# Patient Record
Sex: FEMALE | Race: WHITE | NOT HISPANIC OR LATINO | Employment: OTHER | URBAN - METROPOLITAN AREA
[De-identification: names, ages, dates, MRNs, and addresses within clinical notes are randomized per-mention and may not be internally consistent; named-entity substitution may affect disease eponyms.]

---

## 2017-06-16 ENCOUNTER — APPOINTMENT (OUTPATIENT)
Dept: GENERAL RADIOLOGY | Facility: HOSPITAL | Age: 82
End: 2017-06-16

## 2017-06-16 ENCOUNTER — HOSPITAL ENCOUNTER (INPATIENT)
Facility: HOSPITAL | Age: 82
LOS: 2 days | Discharge: HOME HEALTH | End: 2017-06-19
Attending: EMERGENCY MEDICINE | Admitting: HOSPITALIST

## 2017-06-16 DIAGNOSIS — S72.142A DISPLACED INTERTROCHANTERIC FRACTURE OF LEFT FEMUR, INITIAL ENCOUNTER FOR CLOSED FRACTURE (HCC): ICD-10-CM

## 2017-06-16 DIAGNOSIS — R26.2 DIFFICULTY WALKING: ICD-10-CM

## 2017-06-16 DIAGNOSIS — S72.002A CLOSED LEFT HIP FRACTURE, INITIAL ENCOUNTER (HCC): Primary | ICD-10-CM

## 2017-06-16 PROCEDURE — 93010 ELECTROCARDIOGRAM REPORT: CPT | Performed by: INTERNAL MEDICINE

## 2017-06-16 PROCEDURE — 93005 ELECTROCARDIOGRAM TRACING: CPT | Performed by: EMERGENCY MEDICINE

## 2017-06-16 PROCEDURE — 73502 X-RAY EXAM HIP UNI 2-3 VIEWS: CPT

## 2017-06-16 PROCEDURE — 99284 EMERGENCY DEPT VISIT MOD MDM: CPT

## 2017-06-16 PROCEDURE — 71010 HC CHEST PA OR AP: CPT

## 2017-06-16 RX ORDER — ONDANSETRON 2 MG/ML
4 INJECTION INTRAMUSCULAR; INTRAVENOUS ONCE
Status: DISCONTINUED | OUTPATIENT
Start: 2017-06-16 | End: 2017-06-17

## 2017-06-17 ENCOUNTER — ANESTHESIA (OUTPATIENT)
Dept: PERIOP | Facility: HOSPITAL | Age: 82
End: 2017-06-17

## 2017-06-17 ENCOUNTER — ANESTHESIA EVENT (OUTPATIENT)
Dept: PERIOP | Facility: HOSPITAL | Age: 82
End: 2017-06-17

## 2017-06-17 ENCOUNTER — APPOINTMENT (OUTPATIENT)
Dept: GENERAL RADIOLOGY | Facility: HOSPITAL | Age: 82
End: 2017-06-17

## 2017-06-17 PROBLEM — S72.002A CLOSED LEFT HIP FRACTURE (HCC): Status: ACTIVE | Noted: 2017-06-17

## 2017-06-17 LAB
ABO GROUP BLD: NORMAL
ALBUMIN SERPL-MCNC: 3.9 G/DL (ref 3.5–5.2)
ALBUMIN/GLOB SERPL: 1.1 G/DL
ALP SERPL-CCNC: 51 U/L (ref 39–117)
ALT SERPL W P-5'-P-CCNC: 13 U/L (ref 1–33)
ANION GAP SERPL CALCULATED.3IONS-SCNC: 17.1 MMOL/L
AST SERPL-CCNC: 18 U/L (ref 1–32)
BASOPHILS # BLD AUTO: 0.07 10*3/MM3 (ref 0–0.2)
BASOPHILS NFR BLD AUTO: 0.5 % (ref 0–1.5)
BILIRUB SERPL-MCNC: 0.3 MG/DL (ref 0.1–1.2)
BLD GP AB SCN SERPL QL: NEGATIVE
BUN BLD-MCNC: 21 MG/DL (ref 8–23)
BUN/CREAT SERPL: 29.2 (ref 7–25)
CALCIUM SPEC-SCNC: 8.9 MG/DL (ref 8.6–10.5)
CHLORIDE SERPL-SCNC: 100 MMOL/L (ref 98–107)
CO2 SERPL-SCNC: 18.9 MMOL/L (ref 22–29)
CREAT BLD-MCNC: 0.72 MG/DL (ref 0.57–1)
DEPRECATED RDW RBC AUTO: 51.9 FL (ref 37–54)
EOSINOPHIL # BLD AUTO: 0.05 10*3/MM3 (ref 0–0.7)
EOSINOPHIL NFR BLD AUTO: 0.4 % (ref 0.3–6.2)
ERYTHROCYTE [DISTWIDTH] IN BLOOD BY AUTOMATED COUNT: 14.6 % (ref 11.7–13)
GFR SERPL CREATININE-BSD FRML MDRD: 78 ML/MIN/1.73
GLOBULIN UR ELPH-MCNC: 3.4 GM/DL
GLUCOSE BLD-MCNC: 106 MG/DL (ref 65–99)
HCT VFR BLD AUTO: 37.1 % (ref 35.6–45.5)
HGB BLD-MCNC: 12.2 G/DL (ref 11.9–15.5)
IMM GRANULOCYTES # BLD: 0.04 10*3/MM3 (ref 0–0.03)
IMM GRANULOCYTES NFR BLD: 0.3 % (ref 0–0.5)
INR PPP: 1.06 (ref 0.9–1.1)
LYMPHOCYTES # BLD AUTO: 1.41 10*3/MM3 (ref 0.9–4.8)
LYMPHOCYTES NFR BLD AUTO: 10.9 % (ref 19.6–45.3)
MCH RBC QN AUTO: 32.1 PG (ref 26.9–32)
MCHC RBC AUTO-ENTMCNC: 32.9 G/DL (ref 32.4–36.3)
MCV RBC AUTO: 97.6 FL (ref 80.5–98.2)
MONOCYTES # BLD AUTO: 0.7 10*3/MM3 (ref 0.2–1.2)
MONOCYTES NFR BLD AUTO: 5.4 % (ref 5–12)
NEUTROPHILS # BLD AUTO: 10.68 10*3/MM3 (ref 1.9–8.1)
NEUTROPHILS NFR BLD AUTO: 82.5 % (ref 42.7–76)
PLATELET # BLD AUTO: 260 10*3/MM3 (ref 140–500)
PMV BLD AUTO: 10.4 FL (ref 6–12)
POTASSIUM BLD-SCNC: 3.8 MMOL/L (ref 3.5–5.2)
PROT SERPL-MCNC: 7.3 G/DL (ref 6–8.5)
PROTHROMBIN TIME: 13.4 SECONDS (ref 11.7–14.2)
RBC # BLD AUTO: 3.8 10*6/MM3 (ref 3.9–5.2)
RH BLD: POSITIVE
SODIUM BLD-SCNC: 136 MMOL/L (ref 136–145)
WBC NRBC COR # BLD: 12.95 10*3/MM3 (ref 4.5–10.7)

## 2017-06-17 PROCEDURE — C1713 ANCHOR/SCREW BN/BN,TIS/BN: HCPCS | Performed by: ORTHOPAEDIC SURGERY

## 2017-06-17 PROCEDURE — 80053 COMPREHEN METABOLIC PANEL: CPT | Performed by: EMERGENCY MEDICINE

## 2017-06-17 PROCEDURE — 25010000002 ONDANSETRON PER 1 MG: Performed by: NURSE ANESTHETIST, CERTIFIED REGISTERED

## 2017-06-17 PROCEDURE — 25010000002 HYDROMORPHONE PER 4 MG: Performed by: NURSE ANESTHETIST, CERTIFIED REGISTERED

## 2017-06-17 PROCEDURE — 25010000002 FENTANYL CITRATE (PF) 100 MCG/2ML SOLUTION: Performed by: NURSE ANESTHETIST, CERTIFIED REGISTERED

## 2017-06-17 PROCEDURE — 25010000002 PROPOFOL 10 MG/ML EMULSION: Performed by: NURSE ANESTHETIST, CERTIFIED REGISTERED

## 2017-06-17 PROCEDURE — 0QH706Z INSERTION OF INTRAMEDULLARY INTERNAL FIXATION DEVICE INTO LEFT UPPER FEMUR, OPEN APPROACH: ICD-10-PCS | Performed by: ORTHOPAEDIC SURGERY

## 2017-06-17 PROCEDURE — 73502 X-RAY EXAM HIP UNI 2-3 VIEWS: CPT

## 2017-06-17 PROCEDURE — 25010000002 MORPHINE PER 10 MG: Performed by: HOSPITALIST

## 2017-06-17 PROCEDURE — 86900 BLOOD TYPING SEROLOGIC ABO: CPT | Performed by: EMERGENCY MEDICINE

## 2017-06-17 PROCEDURE — 85025 COMPLETE CBC W/AUTO DIFF WBC: CPT | Performed by: EMERGENCY MEDICINE

## 2017-06-17 PROCEDURE — 76000 FLUOROSCOPY <1 HR PHYS/QHP: CPT

## 2017-06-17 PROCEDURE — 86901 BLOOD TYPING SEROLOGIC RH(D): CPT | Performed by: EMERGENCY MEDICINE

## 2017-06-17 PROCEDURE — 25010000003 CEFAZOLIN IN DEXTROSE 2-4 GM/100ML-% SOLUTION: Performed by: ORTHOPAEDIC SURGERY

## 2017-06-17 PROCEDURE — 85610 PROTHROMBIN TIME: CPT | Performed by: EMERGENCY MEDICINE

## 2017-06-17 PROCEDURE — 0QS7XZZ REPOSITION LEFT UPPER FEMUR, EXTERNAL APPROACH: ICD-10-PCS | Performed by: ORTHOPAEDIC SURGERY

## 2017-06-17 PROCEDURE — 73501 X-RAY EXAM HIP UNI 1 VIEW: CPT

## 2017-06-17 PROCEDURE — 25010000002 DEXAMETHASONE PER 1 MG: Performed by: NURSE ANESTHETIST, CERTIFIED REGISTERED

## 2017-06-17 PROCEDURE — 86850 RBC ANTIBODY SCREEN: CPT | Performed by: EMERGENCY MEDICINE

## 2017-06-17 PROCEDURE — 25010000002 MIDAZOLAM PER 1 MG: Performed by: ANESTHESIOLOGY

## 2017-06-17 DEVICE — K-WIRE: Type: IMPLANTABLE DEVICE | Status: FUNCTIONAL

## 2017-06-17 DEVICE — LOCKING SCREW, FULLY THREADED: Type: IMPLANTABLE DEVICE | Status: FUNCTIONAL

## 2017-06-17 DEVICE — LAG SCREW, TI
Type: IMPLANTABLE DEVICE | Status: FUNCTIONAL
Brand: GAMMA

## 2017-06-17 DEVICE — TROCHANTERIC NAIL KIT, TI
Type: IMPLANTABLE DEVICE | Status: FUNCTIONAL
Brand: GAMMA

## 2017-06-17 RX ORDER — SODIUM CHLORIDE, SODIUM LACTATE, POTASSIUM CHLORIDE, CALCIUM CHLORIDE 600; 310; 30; 20 MG/100ML; MG/100ML; MG/100ML; MG/100ML
9 INJECTION, SOLUTION INTRAVENOUS CONTINUOUS
Status: DISCONTINUED | OUTPATIENT
Start: 2017-06-17 | End: 2017-06-17

## 2017-06-17 RX ORDER — PROMETHAZINE HYDROCHLORIDE 25 MG/ML
6.25 INJECTION, SOLUTION INTRAMUSCULAR; INTRAVENOUS ONCE AS NEEDED
Status: DISCONTINUED | OUTPATIENT
Start: 2017-06-17 | End: 2017-06-17

## 2017-06-17 RX ORDER — SODIUM CHLORIDE 0.9 % (FLUSH) 0.9 %
1-10 SYRINGE (ML) INJECTION AS NEEDED
Status: DISCONTINUED | OUTPATIENT
Start: 2017-06-17 | End: 2017-06-17 | Stop reason: HOSPADM

## 2017-06-17 RX ORDER — FENTANYL CITRATE 50 UG/ML
INJECTION, SOLUTION INTRAMUSCULAR; INTRAVENOUS
Status: DISPENSED
Start: 2017-06-17 | End: 2017-06-18

## 2017-06-17 RX ORDER — LIDOCAINE HYDROCHLORIDE 20 MG/ML
INJECTION, SOLUTION INFILTRATION; PERINEURAL AS NEEDED
Status: DISCONTINUED | OUTPATIENT
Start: 2017-06-17 | End: 2017-06-17 | Stop reason: SURG

## 2017-06-17 RX ORDER — HYDROCODONE BITARTRATE AND ACETAMINOPHEN 5; 325 MG/1; MG/1
1 TABLET ORAL ONCE AS NEEDED
Status: DISCONTINUED | OUTPATIENT
Start: 2017-06-17 | End: 2017-06-17

## 2017-06-17 RX ORDER — DOCUSATE SODIUM 100 MG/1
100 CAPSULE, LIQUID FILLED ORAL 2 TIMES DAILY PRN
Status: DISCONTINUED | OUTPATIENT
Start: 2017-06-17 | End: 2017-06-19 | Stop reason: HOSPADM

## 2017-06-17 RX ORDER — FENTANYL CITRATE 50 UG/ML
50 INJECTION, SOLUTION INTRAMUSCULAR; INTRAVENOUS
Status: DISCONTINUED | OUTPATIENT
Start: 2017-06-17 | End: 2017-06-17 | Stop reason: HOSPADM

## 2017-06-17 RX ORDER — ONDANSETRON 2 MG/ML
INJECTION INTRAMUSCULAR; INTRAVENOUS AS NEEDED
Status: DISCONTINUED | OUTPATIENT
Start: 2017-06-17 | End: 2017-06-17 | Stop reason: SURG

## 2017-06-17 RX ORDER — SODIUM CHLORIDE 9 MG/ML
100 INJECTION, SOLUTION INTRAVENOUS CONTINUOUS
Status: DISCONTINUED | OUTPATIENT
Start: 2017-06-17 | End: 2017-06-18

## 2017-06-17 RX ORDER — DEXAMETHASONE SODIUM PHOSPHATE 10 MG/ML
INJECTION INTRAMUSCULAR; INTRAVENOUS AS NEEDED
Status: DISCONTINUED | OUTPATIENT
Start: 2017-06-17 | End: 2017-06-17 | Stop reason: SURG

## 2017-06-17 RX ORDER — SODIUM CHLORIDE, SODIUM LACTATE, POTASSIUM CHLORIDE, CALCIUM CHLORIDE 600; 310; 30; 20 MG/100ML; MG/100ML; MG/100ML; MG/100ML
100 INJECTION, SOLUTION INTRAVENOUS CONTINUOUS
Status: DISCONTINUED | OUTPATIENT
Start: 2017-06-17 | End: 2017-06-18

## 2017-06-17 RX ORDER — PROMETHAZINE HYDROCHLORIDE 25 MG/1
25 TABLET ORAL ONCE AS NEEDED
Status: DISCONTINUED | OUTPATIENT
Start: 2017-06-17 | End: 2017-06-17

## 2017-06-17 RX ORDER — IPRATROPIUM BROMIDE AND ALBUTEROL SULFATE 2.5; .5 MG/3ML; MG/3ML
3 SOLUTION RESPIRATORY (INHALATION) ONCE AS NEEDED
Status: DISCONTINUED | OUTPATIENT
Start: 2017-06-17 | End: 2017-06-17

## 2017-06-17 RX ORDER — ONDANSETRON 2 MG/ML
4 INJECTION INTRAMUSCULAR; INTRAVENOUS ONCE AS NEEDED
Status: DISCONTINUED | OUTPATIENT
Start: 2017-06-17 | End: 2017-06-17

## 2017-06-17 RX ORDER — HYDROCODONE BITARTRATE AND ACETAMINOPHEN 7.5; 325 MG/1; MG/1
1 TABLET ORAL EVERY 4 HOURS PRN
Status: DISCONTINUED | OUTPATIENT
Start: 2017-06-17 | End: 2017-06-19 | Stop reason: HOSPADM

## 2017-06-17 RX ORDER — PROMETHAZINE HYDROCHLORIDE 25 MG/1
25 SUPPOSITORY RECTAL ONCE AS NEEDED
Status: DISCONTINUED | OUTPATIENT
Start: 2017-06-17 | End: 2017-06-17

## 2017-06-17 RX ORDER — FENTANYL CITRATE 50 UG/ML
50 INJECTION, SOLUTION INTRAMUSCULAR; INTRAVENOUS
Status: DISCONTINUED | OUTPATIENT
Start: 2017-06-17 | End: 2017-06-17

## 2017-06-17 RX ORDER — PROMETHAZINE HYDROCHLORIDE 25 MG/ML
12.5 INJECTION, SOLUTION INTRAMUSCULAR; INTRAVENOUS ONCE AS NEEDED
Status: DISCONTINUED | OUTPATIENT
Start: 2017-06-17 | End: 2017-06-17

## 2017-06-17 RX ORDER — CEFAZOLIN SODIUM 2 G/100ML
2 INJECTION, SOLUTION INTRAVENOUS ONCE
Status: COMPLETED | OUTPATIENT
Start: 2017-06-17 | End: 2017-06-17

## 2017-06-17 RX ORDER — PROPOFOL 10 MG/ML
VIAL (ML) INTRAVENOUS AS NEEDED
Status: DISCONTINUED | OUTPATIENT
Start: 2017-06-17 | End: 2017-06-17 | Stop reason: SURG

## 2017-06-17 RX ORDER — MORPHINE SULFATE 2 MG/ML
2 INJECTION, SOLUTION INTRAMUSCULAR; INTRAVENOUS
Status: DISCONTINUED | OUTPATIENT
Start: 2017-06-17 | End: 2017-06-19 | Stop reason: HOSPADM

## 2017-06-17 RX ORDER — BISACODYL 5 MG/1
10 TABLET, DELAYED RELEASE ORAL DAILY PRN
Status: DISCONTINUED | OUTPATIENT
Start: 2017-06-17 | End: 2017-06-19 | Stop reason: HOSPADM

## 2017-06-17 RX ORDER — SODIUM CHLORIDE 0.9 % (FLUSH) 0.9 %
1-10 SYRINGE (ML) INJECTION AS NEEDED
Status: DISCONTINUED | OUTPATIENT
Start: 2017-06-17 | End: 2017-06-19 | Stop reason: HOSPADM

## 2017-06-17 RX ORDER — FENTANYL CITRATE 50 UG/ML
INJECTION, SOLUTION INTRAMUSCULAR; INTRAVENOUS AS NEEDED
Status: DISCONTINUED | OUTPATIENT
Start: 2017-06-17 | End: 2017-06-17 | Stop reason: SURG

## 2017-06-17 RX ORDER — FAMOTIDINE 10 MG/ML
20 INJECTION, SOLUTION INTRAVENOUS ONCE
Status: COMPLETED | OUTPATIENT
Start: 2017-06-17 | End: 2017-06-17

## 2017-06-17 RX ORDER — MIDAZOLAM HYDROCHLORIDE 1 MG/ML
2 INJECTION INTRAMUSCULAR; INTRAVENOUS
Status: DISCONTINUED | OUTPATIENT
Start: 2017-06-17 | End: 2017-06-17 | Stop reason: HOSPADM

## 2017-06-17 RX ORDER — HYDRALAZINE HYDROCHLORIDE 20 MG/ML
5 INJECTION INTRAMUSCULAR; INTRAVENOUS
Status: DISCONTINUED | OUTPATIENT
Start: 2017-06-17 | End: 2017-06-17

## 2017-06-17 RX ORDER — ONDANSETRON 2 MG/ML
4 INJECTION INTRAMUSCULAR; INTRAVENOUS EVERY 4 HOURS PRN
Status: DISCONTINUED | OUTPATIENT
Start: 2017-06-17 | End: 2017-06-19 | Stop reason: HOSPADM

## 2017-06-17 RX ORDER — CEFAZOLIN SODIUM 2 G/100ML
2 INJECTION, SOLUTION INTRAVENOUS EVERY 8 HOURS
Status: COMPLETED | OUTPATIENT
Start: 2017-06-17 | End: 2017-06-18

## 2017-06-17 RX ORDER — HYDROMORPHONE HYDROCHLORIDE 1 MG/ML
0.25 INJECTION, SOLUTION INTRAMUSCULAR; INTRAVENOUS; SUBCUTANEOUS
Status: DISCONTINUED | OUTPATIENT
Start: 2017-06-17 | End: 2017-06-17

## 2017-06-17 RX ORDER — MIDAZOLAM HYDROCHLORIDE 1 MG/ML
1 INJECTION INTRAMUSCULAR; INTRAVENOUS
Status: DISCONTINUED | OUTPATIENT
Start: 2017-06-17 | End: 2017-06-17 | Stop reason: HOSPADM

## 2017-06-17 RX ORDER — LABETALOL HYDROCHLORIDE 5 MG/ML
5 INJECTION, SOLUTION INTRAVENOUS
Status: DISCONTINUED | OUTPATIENT
Start: 2017-06-17 | End: 2017-06-17

## 2017-06-17 RX ADMIN — CEFAZOLIN SODIUM 2 G: 2 INJECTION, SOLUTION INTRAVENOUS at 13:21

## 2017-06-17 RX ADMIN — MORPHINE SULFATE 2 MG: 2 INJECTION, SOLUTION INTRAMUSCULAR; INTRAVENOUS at 08:45

## 2017-06-17 RX ADMIN — HYDROMORPHONE HYDROCHLORIDE 0.25 MG: 1 INJECTION, SOLUTION INTRAMUSCULAR; INTRAVENOUS; SUBCUTANEOUS at 16:21

## 2017-06-17 RX ADMIN — DEXAMETHASONE SODIUM PHOSPHATE 8 MG: 10 INJECTION INTRAMUSCULAR; INTRAVENOUS at 13:30

## 2017-06-17 RX ADMIN — LIDOCAINE HYDROCHLORIDE 60 MG: 20 INJECTION, SOLUTION INFILTRATION; PERINEURAL at 13:26

## 2017-06-17 RX ADMIN — FAMOTIDINE 20 MG: 10 INJECTION, SOLUTION INTRAVENOUS at 12:56

## 2017-06-17 RX ADMIN — SODIUM CHLORIDE 100 ML/HR: 9 INJECTION, SOLUTION INTRAVENOUS at 06:13

## 2017-06-17 RX ADMIN — HYDROMORPHONE HYDROCHLORIDE 0.25 MG: 1 INJECTION, SOLUTION INTRAMUSCULAR; INTRAVENOUS; SUBCUTANEOUS at 15:25

## 2017-06-17 RX ADMIN — FENTANYL CITRATE 50 MCG: 50 INJECTION INTRAMUSCULAR; INTRAVENOUS at 13:26

## 2017-06-17 RX ADMIN — CEFAZOLIN SODIUM 2 G: 2 INJECTION, SOLUTION INTRAVENOUS at 20:30

## 2017-06-17 RX ADMIN — FENTANYL CITRATE 50 MCG: 50 INJECTION INTRAMUSCULAR; INTRAVENOUS at 13:45

## 2017-06-17 RX ADMIN — FENTANYL CITRATE 50 MCG: 50 INJECTION INTRAMUSCULAR; INTRAVENOUS at 15:10

## 2017-06-17 RX ADMIN — SODIUM CHLORIDE, POTASSIUM CHLORIDE, SODIUM LACTATE AND CALCIUM CHLORIDE 100 ML/HR: 600; 310; 30; 20 INJECTION, SOLUTION INTRAVENOUS at 16:51

## 2017-06-17 RX ADMIN — ONDANSETRON 4 MG: 2 INJECTION INTRAMUSCULAR; INTRAVENOUS at 14:10

## 2017-06-17 RX ADMIN — PROPOFOL 150 MG: 10 INJECTION, EMULSION INTRAVENOUS at 13:26

## 2017-06-17 RX ADMIN — HYDROMORPHONE HYDROCHLORIDE 0.25 MG: 1 INJECTION, SOLUTION INTRAMUSCULAR; INTRAVENOUS; SUBCUTANEOUS at 15:35

## 2017-06-17 RX ADMIN — SODIUM CHLORIDE, POTASSIUM CHLORIDE, SODIUM LACTATE AND CALCIUM CHLORIDE: 600; 310; 30; 20 INJECTION, SOLUTION INTRAVENOUS at 12:58

## 2017-06-17 RX ADMIN — MORPHINE SULFATE 2 MG: 2 INJECTION, SOLUTION INTRAMUSCULAR; INTRAVENOUS at 03:35

## 2017-06-17 RX ADMIN — SODIUM CHLORIDE, POTASSIUM CHLORIDE, SODIUM LACTATE AND CALCIUM CHLORIDE: 600; 310; 30; 20 INJECTION, SOLUTION INTRAVENOUS at 14:17

## 2017-06-17 RX ADMIN — MIDAZOLAM 1 MG: 1 INJECTION INTRAMUSCULAR; INTRAVENOUS at 12:56

## 2017-06-17 NOTE — ANESTHESIA PROCEDURE NOTES
Airway  Urgency: elective    Airway not difficult    General Information and Staff    Patient location during procedure: OR  Anesthesiologist: VICTORINA SHEPPARD  CRNA: JONATHON KEYS    Indications and Patient Condition  Indications for airway management: airway protection    Preoxygenated: yes  Mask difficulty assessment: 1 - vent by mask    Final Airway Details  Final airway type: supraglottic airway      Successful airway: classic  Size 4    Number of attempts at approach: 1    Additional Comments  PreO2, IV induction, easy mask, LMA placed w/o difficulty, cuff as packaged- no additional air placed, secured in placed, EBBSH, +etCO2, atraumatic, teeth and lips as preop.

## 2017-06-17 NOTE — ANESTHESIA POSTPROCEDURE EVALUATION
Patient: Carla Campos    Procedure Summary     Date Anesthesia Start Anesthesia Stop Room / Location    06/17/17 1321 1440  [facility] / [facility]       Procedure Diagnosis Surgeon Provider    LEFT FEMUR INTRAMEDULLARY NAILING/RODDING (Left Thigh) Displaced intertrochanteric fracture of left femur, initial encounter for closed fracture  (Displaced intertrochanteric fracture of left femur, initial encounter for closed fracture [S72.142A]) MD Rocco Paz MD          Anesthesia Type: general  Last vitals  /76 (06/17/17 1515)    Temp 36.6 °C (97.9 °F) (06/17/17 1436)    Pulse 81 (06/17/17 1515)   Resp 20 (06/17/17 1515)    SpO2 99 % (06/17/17 1515)      Post Anesthesia Care and Evaluation    Patient location during evaluation: bedside  Patient participation: complete - patient participated  Level of consciousness: awake  Pain score: 2  Pain management: adequate  Airway patency: patent  Anesthetic complications: No anesthetic complications    Cardiovascular status: acceptable  Respiratory status: acceptable  Hydration status: acceptable

## 2017-06-17 NOTE — ANESTHESIA PREPROCEDURE EVALUATION
Anesthesia Evaluation     Patient summary reviewed and Nursing notes reviewed   NPO Solid Status: > 8 hours       Airway   Mallampati: II  no difficulty expected  Dental - normal exam     Pulmonary - negative pulmonary ROS    breath sounds clear to auscultation  Cardiovascular - negative cardio ROS    ECG reviewed  Rhythm: regular  Rate: normal        Neuro/Psych- negative ROS  GI/Hepatic/Renal/Endo - negative ROS     Musculoskeletal (-) negative ROS    Abdominal    Substance History - negative use     OB/GYN negative ob/gyn ROS         Other                                        Anesthesia Plan    ASA 3     general     intravenous induction   Anesthetic plan and risks discussed with patient.    Plan discussed with CRNA.    No home meds!!

## 2017-06-17 NOTE — OP NOTE
ORTHOPAEDIC OPERATIVE NOTE    Patient: Carla Campos   YOB: 1935    Medical Record Number: [id number]    Attending Physician: Dhruv Montalvo MD    Primary Care Physician: Edgard Recinos MD    DATE OF PROCEDURE: 6/17/2017    Surgeon: Zabrina Andrews MD        PREOPERATIVE DIAGNOSIS:  Displaced intertrochanteric fracture of left femur, initial encounter for closed fracture [S72.142A]    .  POSTOPERATIVE DIAGNOSIS:   Displaced intertrochanteric fracture of left femur, initial encounter for closed fracture [S72.142A]    PROCEDURE PERFORMED: ME OPEN FIX INTER/SUBTROCH FX,IMPLNT [96837] (FEMUR INTRAMEDULLARY NAILING/RODDING) utilizing a [facility]    gamma nail measuring  11×180 mm, 125° neck angle, lag screw 100 mm.     SURGEON: Zabrina Andrews MD     ASSISTANT: none    ANESTHESIA:  General  Anesthesiologist: Rocco Lovell MD  CRNA: Emily Lind CRNA    ESTIMATED BLOOD LOSS: 50 mL    SPECIMENS:  * No orders in the log *    COMPLICATIONS:  Nil.     DRAINS: Nil  Urethral Catheter 06/17/17 0900 14 10 (Active)   Daily Indications Acute retention 6/17/2017  8:45 AM   Securement secured to upper leg with adhesive device 6/17/2017  8:45 AM   Irrigation Return yellow;clear 6/17/2017  8:45 AM   Catheter care done Yes 6/17/2017  8:45 AM   Tolerance no signs/symptoms of discomfort 6/17/2017  8:45 AM           INDICATIONS: The patient is a 81 y.o. female  who presented to  [facility] emergency room following a  Fall from standing height.  The patient sustained an injury to the hip. Evaluation of the x-rays confirm presence of a comminuted intertrochanteric fracture. Treatment options and alternatives were discussed in detail with the patient who is indicated for a internal fixation of the  hip fracture. Likely risks and benefits of the procedure, including but not limited to  infection, DVT, pulmonary embolism, fat embolism, malunion,  nonunion, leg length discrepancy, failure of fixation, periimplant fractures, medical risks including stroke, heart attack, have been discussed in detail. Despite the risks involved, the patient elected to proceed and informed consent was obtained and the patient was scheduled for surgery. The patient was seen in the preoperative holding area and the operative site was marked. The patient has been seen and cleared  by the admitting service to the operating room.    DESCRIPTION OF PROCEDURE:   The patient was transferred to [facility] operating room. Preoperative antibiotics in the form of Kefzol  intravenously was infused prior to the incision  according to the SCIP protocol. A surgical time out was done with the team and the correct patient, surgical side and site were identified.      After achieving adequate general anesthesia, the patient was transferred onto the [place] table.  All bony prominences were padded well.  Closed reduction of the hip fracture was done and the position was satisfactory under image intensifier control, both in AP and lateral views.  The  hip was prepped and draped in the usual sterile fashion.  A skin incision about 4 cm long was made proximal to the tip of the greater trochanter.    Skin and subcutaneous  tissue and deep fascia was incised in line with the skin incision.  A threaded guidewire was utilized to enter the proximal femur and the the guidewire was found to be satisfactorily seated in the medullary canal. An entry reamer was utilized.   There was significant osteopenia.  The trochanteric fracture itself was found to be comminuted.   A  gamma nail  measuring 11× 180 mm was selected, assembled to its introducer on the back table.  The nail was seated over the guidewire.  It was found to be seated satisfactorily.  A second skin incision was made over the lateral aspect of the thigh and a threaded guidewire was passed from the lateral femoral cortex into the femoral  neck and femoral head utilizing the locking zig.  This was found to be seated satisfactorily in both AP and lateral views.  Estimated lag screw length was measured, triple reamer was utilized and a lag screw 100 mm was seated into position over the guidewire.  A set screw was seated, making this an angle stable device.  Followed by this a single interlocking screw was placed from lateral to medial direction utilizing locking zig, in the static hole using a separate small incision.    The position of the hardware, The reduction of the fracture, The fracture alignment and the stability of fixation was found to be satisfactory and stable.  Incisions were thoroughly irrigated with saline and closed in layers utilizing Vicryl  And skin Staples.  Sterile dressings were placed and the patient was transferred to the recovery room in a stable condition.  The patient tolerated the procedure well.  There were no complications.  The patient had adequate distal pulses and good capillary refill.  I plan to continue  antibiotics postoperatively  Kefzol  IV every 8 hours for the first 24 hours.  Also,  will be started on  Lovenox 40 mg subcutaneously daily starting on postoperative day #1.  We will mobilize the patient  with physical therapy.       Zabrina Andrews M.D.    6/17/2017    CC: Edgard Recinos MD; MD Dhruv Gaines MD    EMR Dragon/Transcription disclaimer:   Much of this encounter note is an electronic transcription/translation of spoken language to printed text. The electronic translation of spoken language may permit erroneous, or at times, nonsensical words or phrases to be inadvertently transcribed; Although I have reviewed the note for such errors, some may still exist.

## 2017-06-17 NOTE — H&P
HISTORY AND PHYSICAL   [facility]        Patient Identification:  Name: Carla Campos  Age: 81 y.o.  Sex: female  :  1935  MRN: [id number]                     Primary Care Physician: Edgard Recinos MD    Chief Complaint:  Left hip pain    History of Present Illness:        The patient is a 81-year-old white female with no significant medical problems is admitted with having history of having tripping and falling when she was going outside to  attend to animals.  She tripped and fell and injured her left hip was not able to get up.  She was brought to the hospital and found to have a left hip fracture.  She otherwise been in her usual state of health and denied having any chest pain shortness of air she's not had any fevers or chills.  She's not had any nausea vomiting.  She was admitted for further treatment of a left intertrochanteric hip fracture.      Past Medical History:  History reviewed. No pertinent past medical history.  Past Surgical History:  Past Surgical History:   Procedure Laterality Date   • BLADDER REPAIR     • COLONOSCOPY     • HYSTERECTOMY        Home Meds:  No prescriptions prior to admission.       Allergies:  No Known Allergies  Immunizations:    There is no immunization history on file for this patient.  Social History:   Social History     Social History Narrative   • No narrative on file     Social History     Social History   • Marital status:      Spouse name: N/A   • Number of children: N/A   • Years of education: N/A     Occupational History   • Not on file.     Social History Main Topics   • Smoking status: Former Smoker   • Smokeless tobacco: Not on file   • Alcohol use Yes      Comment: one daily   • Drug use: No   • Sexual activity: Not on file     Other Topics Concern   • Not on file     Social History Narrative   • No narrative on file       Family History:  Family History   Problem Relation Age of Onset   • Hypertension Mother    • COPD Father      "    Review of Systems  See history of present illness and past medical history.  Patient denies headache, dizziness, syncope,  change in vision, change in hearing, change in taste, changes in weight, changes in appetite, focal weakness, numbness, or paresthesia.  Patient denies chest pain, palpitations, dyspnea, orthopnea, PND, cough, sinus pressure, rhinorrhea, epistaxis, hemoptysis, nausea, vomiting,hematemesis, diarrhea, constipation or hematchezia.  Denies cold or heat intolerance, polydipsia, polyuria, polyphagia. Denies hematuria, pyuria, dysuria, hesitancy, frequency or urgency.  Denies fever, chills, sweats, night sweats.   Remainder of ROS is negative.    Objective:  tMax 24 hrs: Temp (24hrs), Av.6 °F (36.4 °C), Min:96.8 °F (36 °C), Max:98.6 °F (37 °C)    Vitals Ranges:   Temp:  [96.8 °F (36 °C)-98.6 °F (37 °C)] 96.8 °F (36 °C)  Heart Rate:  [80-91] 83  Resp:  [15-18] 18  BP: (105-139)/(68-86) 105/68      Exam:  /68 (BP Location: Left arm, Patient Position: Lying)  Pulse 83  Temp 96.8 °F (36 °C) (Oral)   Resp 18  Ht 67\" (170.2 cm)  Wt 162 lb (73.5 kg)  SpO2 92%  BMI 25.37 kg/m2    General Appearance:    Alert, cooperative, no distress, appears stated age   Head:    Normocephalic, without obvious abnormality, atraumatic   Eyes:    PERRL, conjunctiva/corneas clear, EOM's intact, both eyes   Ears:    Normal external ear canals, both ears   Nose:   Nares normal, septum midline, mucosa normal, no drainage    or sinus tenderness   Throat:   Lips, mucosa, and tongue normal   Neck:   Supple, symmetrical, trachea midline, no adenopathy;     thyroid:  no enlargement/tenderness/nodules; no carotid    bruit or JVD   Back:     Symmetric, no curvature, ROM normal, no CVA tenderness   Lungs:     Clear to auscultation bilaterally, respirations unlabored   Chest Wall:    No tenderness or deformity    Heart:    Regular rate and rhythm, S1 and S2 normal, no murmur, rub   or gallop   Abdomen:     Soft, " non-tender, bowel sounds active all four quadrants,     no masses, no hepatomegaly, no splenomegaly   Extremities:   Extremities normal, Deformity of left hip consistent with fracture, no cyanosis or edema   Pulses:   2+ and symmetric all extremities   Skin:   Skin color, texture, turgor normal, no rashes or lesions   Lymph nodes:   Cervical, supraclavicular, and axillary nodes normal   Neurologic:   CNII-XII intact, normal strength, sensation intact throughout      .    Data Review:  Lab Results (last 72 hours)     Procedure Component Value Units Date/Time    CBC & Differential [[id number]] Collected:  06/17/17 0016    Specimen:  Blood Updated:  06/17/17 0047    Narrative:       The following orders were created for panel order CBC & Differential.  Procedure                               Abnormality         Status                     ---------                               -----------         ------                     CBC Auto Differential[[id number]]        Abnormal            Final result                 Please view results for these tests on the individual orders.    CBC Auto Differential [[id number]]  (Abnormal) Collected:  06/17/17 0016    Specimen:  Blood Updated:  06/17/17 0047     WBC 12.95 (H) 10*3/mm3      RBC 3.80 (L) 10*6/mm3      Hemoglobin 12.2 g/dL      Hematocrit 37.1 %      MCV 97.6 fL      MCH 32.1 (H) pg      MCHC 32.9 g/dL      RDW 14.6 (H) %      RDW-SD 51.9 fl      MPV 10.4 fL      Platelets 260 10*3/mm3      Neutrophil % 82.5 (H) %      Lymphocyte % 10.9 (L) %      Monocyte % 5.4 %      Eosinophil % 0.4 %      Basophil % 0.5 %      Immature Grans % 0.3 %      Neutrophils, Absolute 10.68 (H) 10*3/mm3      Lymphocytes, Absolute 1.41 10*3/mm3      Monocytes, Absolute 0.70 10*3/mm3      Eosinophils, Absolute 0.05 10*3/mm3      Basophils, Absolute 0.07 10*3/mm3      Immature Grans, Absolute 0.04 (H) 10*3/mm3     Comprehensive Metabolic Panel [[id number]]  (Abnormal) Collected:  06/17/17 0016     Specimen:  Blood Updated:  06/17/17 0048     Glucose 106 (H) mg/dL      BUN 21 mg/dL      Creatinine 0.72 mg/dL      Sodium 136 mmol/L      Potassium 3.8 mmol/L      Chloride 100 mmol/L      CO2 18.9 (L) mmol/L      Calcium 8.9 mg/dL      Total Protein 7.3 g/dL      Albumin 3.90 g/dL      ALT (SGPT) 13 U/L      AST (SGOT) 18 U/L      Alkaline Phosphatase 51 U/L      Total Bilirubin 0.3 mg/dL      eGFR Non African Amer 78 mL/min/1.73      Globulin 3.4 gm/dL      A/G Ratio 1.1 g/dL      BUN/Creatinine Ratio 29.2 (H)     Anion Gap 17.1 mmol/L     Narrative:       The MDRD GFR formula is only valid for adults with stable renal function between ages 18 and 70.    Protime-INR [[id number]]  (Normal) Collected:  06/17/17 0016    Specimen:  Blood Updated:  06/17/17 0055     Protime 13.4 Seconds      INR 1.06                   Imaging Results (all)     Procedure Component Value Units Date/Time    XR Hip With or Without Pelvis 2 - 3 View Left [[id number]] Collected:  06/16/17 2350     Updated:  06/16/17 2350    Narrative:       2 VIEWS OF THE LEFT HIP, SINGLE VIEW OF THE PELVIS.     HISTORY: Hip fracture.     COMPARISON: No prior studies for comparison.     FINDINGS:  There is an intertrochanteric displaced fracture of the left proximal  femur, avulsion of the lesser trochanter.          Soft tissue swelling on the left.       Impression:       Intertrochanteric fracture of the left proximal femur.      ----------------------------------------------------------------     X-RAY CHEST 1 VIEW.     HISTORY: Hip fracture.     COMPARISON: No prior studies for comparison.     FINDINGS:  Cardiomediastinal silhouette is within normal limits.         There is no consolidation or effusion.            IMPRESSION:  No acute findings.           XR Chest 1 View [[id number]] Collected:  06/16/17 2350     Updated:  06/16/17 2350    Narrative:       2 VIEWS OF THE LEFT HIP, SINGLE VIEW OF THE PELVIS.     HISTORY: Hip fracture.     COMPARISON: No  prior studies for comparison.     FINDINGS:  There is an intertrochanteric displaced fracture of the left proximal  femur, avulsion of the lesser trochanter.          Soft tissue swelling on the left.       Impression:       Intertrochanteric fracture of the left proximal femur.      ----------------------------------------------------------------     X-RAY CHEST 1 VIEW.     HISTORY: Hip fracture.     COMPARISON: No prior studies for comparison.     FINDINGS:  Cardiomediastinal silhouette is within normal limits.         There is no consolidation or effusion.            IMPRESSION:  No acute findings.               Patient Active Problem List   Diagnosis Code   • Closed left hip fracture S72.002A       Assessment:  Active Problems:    Closed left hip fracture      Plan:  The patient is admitted hospital consult orthopedic surgery.  She appears to be still medically stable and cleared first surgical intervention on left hip fracture.    Dario Verduzco MD  6/17/2017  4:00 AM

## 2017-06-17 NOTE — CONSULTS
Orthopedic Consult    Patient: Carla Campos                                           YOB: 1935     Date of Admission: 6/16/2017 11:01 PM            Medical Record Number: [id number]    Attending Physician: Dhruv Montalvo MD    Consulting Physician: Zabrina Andrews MD    Reason for Consult: Left hip fracture    History of Present Illness: 81 y.o. female admitted to [facility] with Closed left hip fracture [S72.002A]  Closed left hip fracture, initial encounter [S72.002A].     The patient was evaluated in the emergency room and was diagnosed with a  hip fracture. As I was on call for the emergency room I was consulted for further evaluation and treatment. The patient was in the usual state of health and fell from standing height in her dog run trying to clean up. Resulting in sudden onset hip pain and inability to ambulate. Denies any history of loss of consciousness, headache, vomiting, or seizures.   Denies any other injuries. The patient is accompanied by family members  to this hospital visit.  The patient denies any prior  pre-existing pain in the hip or prior use of any assistive device.   The patient  lives at home with  Her  , is quite active and independent in activities of daily living.    Patient denies any history of: DVT/PE, MRSA, COPD, CHF, CAD, Diabetes mellitus , Dementia or A-Fib.   The patient has history of :  The patient is not on anticoagulants:     Past medical history, past surgical history, social history, family history, ALLERGIES, current medications have been reviewed by me.    History reviewed. No pertinent past medical history.  Past Surgical History:   Procedure Laterality Date   • BLADDER REPAIR     • COLONOSCOPY     • HYSTERECTOMY     • TUBAL ABDOMINAL LIGATION Bilateral     age mid twenties     Social History     Occupational History   • Not on file.     Social History Main Topics   • Smoking status: Former Smoker   • Smokeless  "tobacco: Not on file   • Alcohol use Yes      Comment: one daily   • Drug use: No   • Sexual activity: Not on file      Social History     Social History Narrative   • No narrative on file     Family History   Problem Relation Age of Onset   • Hypertension Mother    • COPD Father         No Known Allergies    Home Medications:  No prescriptions prior to admission.       Current Medications:  Scheduled Meds:   Continuous Infusions:  sodium chloride 100 mL/hr Last Rate: 100 mL/hr (06/17/17 0613)     PRN Meds:.•  Morphine  •  ondansetron  •  sodium chloride    Review of Systems:   A 12 point system review was reviewed with the patient and from the chart  and is negative except as in history of present illness.      Physical Exam: 81 y.o. female                    Vitals:    06/17/17 0118 06/17/17 0156 06/17/17 0354 06/17/17 0700   BP: 138/86 139/81 105/68 125/74   BP Location:  Left arm Left arm Left arm   Patient Position:  Lying Lying Lying   Pulse: 91 90 83 80   Resp: 15 18 18 18   Temp: 98.6 °F (37 °C) 97 °F (36.1 °C) 96.8 °F (36 °C) 98 °F (36.7 °C)   TempSrc: Oral Oral Oral Oral   SpO2: 95% 97% 92% 92%   Weight:  162 lb (73.5 kg)     Height:  67\" (170.2 cm)          Gait: Could not be tested , patient is nonambulatory.    Mental/HEENT/cardio/skin: The patient's general appearance was well-nourished, well-hydrated, no acute distress.  Orientation was alert and oriented ×3.  The patient's mood was normal.   Pulmonary exam shows normal late exchange, no labored breathing, or shortness of breath.    The skin exam showed normal temperature and color in the area of examination.    Extremities: Left   lower extremity positive shortening, positive external rotation, attempted movements of the  hip are painful and restricted. The patient is able to do gentle active range of motion of her toes. Gross sensation is intact over the toes.    Pulses: Pulses palpable and equal bilaterally    Diagnostic Tests:      Results from " last 7 days  Lab Units 06/17/17  0016   WBC 10*3/mm3 12.95*   HEMOGLOBIN g/dL 12.2   HEMATOCRIT % 37.1   PLATELETS 10*3/mm3 260       Results from last 7 days  Lab Units 06/17/17  0016   SODIUM mmol/L 136   POTASSIUM mmol/L 3.8   CHLORIDE mmol/L 100   TOTAL CO2 mmol/L 18.9*   BUN mg/dL 21   CREATININE mg/dL 0.72   GLUCOSE mg/dL 106*   CALCIUM mg/dL 8.9       Results from last 7 days  Lab Units 06/17/17  0016   INR  1.06       Xr Chest 1 View    Result Date: 6/16/2017  Narrative: 2 VIEWS OF THE LEFT HIP, SINGLE VIEW OF THE PELVIS.  HISTORY: Hip fracture.  COMPARISON: No prior studies for comparison.  FINDINGS: There is an intertrochanteric displaced fracture of the left proximal femur, avulsion of the lesser trochanter.      Soft tissue swelling on the left.      Impression: Intertrochanteric fracture of the left proximal femur.  ----------------------------------------------------------------  X-RAY CHEST 1 VIEW.  HISTORY: Hip fracture.  COMPARISON: No prior studies for comparison.  FINDINGS: Cardiomediastinal silhouette is within normal limits.     There is no consolidation or effusion.      IMPRESSION: No acute findings.         Xr Hip With Or Without Pelvis 2 - 3 View Left    Result Date: 6/16/2017  Narrative: 2 VIEWS OF THE LEFT HIP, SINGLE VIEW OF THE PELVIS.  HISTORY: Hip fracture.  COMPARISON: No prior studies for comparison.  FINDINGS: There is an intertrochanteric displaced fracture of the left proximal femur, avulsion of the lesser trochanter.      Soft tissue swelling on the left.      Impression: Intertrochanteric fracture of the left proximal femur.  ----------------------------------------------------------------  X-RAY CHEST 1 VIEW.  HISTORY: Hip fracture.  COMPARISON: No prior studies for comparison.  FINDINGS: Cardiomediastinal silhouette is within normal limits.     There is no consolidation or effusion.      IMPRESSION: No acute findings.           Assessment: Left  Closed comminuted  intertrochanteric Hip Fracture with displacement.     Patient Active Problem List   Diagnosis   • Closed left hip fracture       Plan:  The patient is indicated for a left hip open reduction and internal fixation. I discussed the options and alternatives. The patient voiced understanding of the risks, benefits, and alternative forms of treatment that were discussed including but not limited to infection, DVT, pulmonary embolism, malunion, nonunion, leg length discrepancy, possibility of injury to nerves and vessels, periprosthetic fractures have been discussed in detail. Despite the above risks the patient consents to proceed with  hip surgical intervention.   Patient will be made NPO.  Obtain informed consent.   The patient is scheduled for the above surgery tentatively for today .   The patient's admitting service has seen the patient and the patient is cleared to the operating room.    Date: 6/17/2017    Zabrina Andrews MD    CC: Edgard Recinos MD; MD Dhruv Gaines MD    EMR Dragon/Transcription disclaimer:   Much of this encounter note is an electronic transcription/translation of spoken language to printed text. The electronic translation of spoken language may permit erroneous, or at times, nonsensical words or phrases to be inadvertently transcribed; Although I have reviewed the note for such errors, some may still exist.

## 2017-06-17 NOTE — BRIEF OP NOTE
FEMUR INTRAMEDULLARY NAILING/RODDING  Procedure Note    Carla Campos  6/16/2017 - 6/17/2017    Pre-op Diagnosis:   Displaced intertrochanteric fracture of left femur, initial encounter for closed fracture [S72.142A]    Post-op Diagnosis:     Post-Op Diagnosis Codes:     * Displaced intertrochanteric fracture of left femur, initial encounter for closed fracture [S72.142A]    Procedure/CPT® Codes:      Procedure(s):  LEFT FEMUR INTRAMEDULLARY NAILING/RODDING    Surgeon(s):  Zabrina Andrews MD    Anesthesia: General    Staff:   Circulator: Johana Lackey RN  Radiology Technologist: Mei Allison, RRT  Scrub Person: Rosibel Smith  Vendor Representative: Papito Carter    Estimated Blood Loss: 50 mL  Urine Voided: 700 mL    Specimens:                * No specimens in log *      Drains:   Urethral Catheter 06/17/17 0900 14 10 (Active)   Daily Indications Acute retention 6/17/2017  8:45 AM   Securement secured to upper leg with adhesive device 6/17/2017  8:45 AM   Irrigation Return yellow;clear 6/17/2017  8:45 AM   Catheter care done Yes 6/17/2017  8:45 AM   Tolerance no signs/symptoms of discomfort 6/17/2017  8:45 AM           Findings: see dict     Complications: waldemar Andrews MD     Date: 6/17/2017  Time: 2:44 PM

## 2017-06-17 NOTE — PLAN OF CARE
Problem: Patient Care Overview (Adult)  Goal: Plan of Care Review  Outcome: Ongoing (interventions implemented as appropriate)    06/17/17 1750   Coping/Psychosocial Response Interventions   Plan Of Care Reviewed With patient;spouse   Patient Care Overview   Progress improving   Outcome Evaluation   Outcome Summary/Follow up Plan doing well after surgery. dressing d/i. refusing any pain meds at this time. ivf going. resting comforably with spouse at side.          Problem: Fall Risk (Adult)  Goal: Identify Related Risk Factors and Signs and Symptoms  Outcome: Outcome(s) achieved Date Met:  06/17/17  Goal: Absence of Falls  Outcome: Ongoing (interventions implemented as appropriate)    Problem: Fractured Hip (Adult)  Goal: Signs and Symptoms of Listed Potential Problems Will be Absent or Manageable (Fractured Hip)  Outcome: Ongoing (interventions implemented as appropriate)    Problem: Perioperative Period (Adult)  Goal: Signs and Symptoms of Listed Potential Problems Will be Absent or Manageable (Perioperative Period)  Outcome: Outcome(s) achieved Date Met:  06/17/17

## 2017-06-17 NOTE — PROGRESS NOTES
"  Name: Carla Campos  ADMIT: 2017   Age/Sex: 81 y.o.female LOS:  LOS: 0 days    :    1935     ROOM: [facility]   MRN:    [id number]    PCP:    Edgard Recinos MD     Subjective   Pain controlled. Wants to eat    Objective   Vital Signs  Temp:  [96.8 °F (36 °C)-98.6 °F (37 °C)] 98.5 °F (36.9 °C)  Heart Rate:  [80-92] 92  Resp:  [15-18] 18  BP: (105-139)/(68-86) 121/76  Body mass index is 25.37 kg/(m^2).    Objective:  General Appearance:  Comfortable and well-appearing.    Vital signs: (most recent): Blood pressure 121/76, pulse 92, temperature 98.5 °F (36.9 °C), temperature source Oral, resp. rate 18, height 67\" (170.2 cm), weight 162 lb (73.5 kg), SpO2 94 %.  Vital signs are normal.    HEENT: Normal HEENT exam.    Lungs:  Normal effort.  Breath sounds clear to auscultation.    Heart: Normal rate.  Regular rhythm.    Abdomen: Abdomen is soft and non-distended.  Bowel sounds are normal.   There is no abdominal tenderness.     Extremities: There is no deformity or dependent edema.    Neurological: Patient is alert and oriented to person, place and time.    Skin:  Warm and dry.  No rash.             Results Review:       I reviewed the patient's new clinical results.    Results from last 7 days  Lab Units 17  0016   WBC 10*3/mm3 12.95*   HEMOGLOBIN g/dL 12.2   PLATELETS 10*3/mm3 260     Results from last 7 days  Lab Units 17  0016   SODIUM mmol/L 136   POTASSIUM mmol/L 3.8   CHLORIDE mmol/L 100   TOTAL CO2 mmol/L 18.9*   BUN mg/dL 21   CREATININE mg/dL 0.72   GLUCOSE mg/dL 106*   Estimated Creatinine Clearance: 55.5 mL/min (by C-G formula based on Cr of 0.72).  Results from last 7 days  Lab Units 17  0016   CALCIUM mg/dL 8.9   ALBUMIN g/dL 3.90       RADIOLOGY  2017  Pending         sodium chloride 100 mL/hr Last Rate: 100 mL/hr (17)   NPO Diet      Assessment/Plan   Assessment:   Active Problems:    Closed left hip fracture        Plan:   - cont pain control  - to OR " today  - monitor post-op      Disposition  TBD.      Dhruv Montalvo MD  [facility]  06/17/17  12:25 PM

## 2017-06-17 NOTE — PLAN OF CARE
Problem: Patient Care Overview (Adult)  Goal: Plan of Care Review  Outcome: Ongoing (interventions implemented as appropriate)    Problem: Fall Risk (Adult)  Goal: Absence of Falls  Outcome: Ongoing (interventions implemented as appropriate)    06/17/17 0500   Fall Risk (Adult)   Absence of Falls making progress toward outcome

## 2017-06-17 NOTE — ED NOTES
Attempted to call report, RN not available ( at lunch), was told noone else avail to take report, room is clean, pt transported by EMRE Navarrete RN  06/17/17 7249

## 2017-06-18 LAB
ANION GAP SERPL CALCULATED.3IONS-SCNC: 11.7 MMOL/L
BASOPHILS # BLD AUTO: 0.03 10*3/MM3 (ref 0–0.2)
BASOPHILS NFR BLD AUTO: 0.3 % (ref 0–1.5)
BUN BLD-MCNC: 11 MG/DL (ref 8–23)
BUN/CREAT SERPL: 19 (ref 7–25)
CALCIUM SPEC-SCNC: 8.1 MG/DL (ref 8.6–10.5)
CHLORIDE SERPL-SCNC: 104 MMOL/L (ref 98–107)
CO2 SERPL-SCNC: 23.3 MMOL/L (ref 22–29)
CREAT BLD-MCNC: 0.58 MG/DL (ref 0.57–1)
DEPRECATED RDW RBC AUTO: 52 FL (ref 37–54)
EOSINOPHIL # BLD AUTO: 0.01 10*3/MM3 (ref 0–0.7)
EOSINOPHIL NFR BLD AUTO: 0.1 % (ref 0.3–6.2)
ERYTHROCYTE [DISTWIDTH] IN BLOOD BY AUTOMATED COUNT: 14.4 % (ref 11.7–13)
GFR SERPL CREATININE-BSD FRML MDRD: 100 ML/MIN/1.73
GLUCOSE BLD-MCNC: 139 MG/DL (ref 65–99)
HCT VFR BLD AUTO: 32.1 % (ref 35.6–45.5)
HGB BLD-MCNC: 10.7 G/DL (ref 11.9–15.5)
IMM GRANULOCYTES # BLD: 0.03 10*3/MM3 (ref 0–0.03)
IMM GRANULOCYTES NFR BLD: 0.3 % (ref 0–0.5)
LYMPHOCYTES # BLD AUTO: 1.05 10*3/MM3 (ref 0.9–4.8)
LYMPHOCYTES NFR BLD AUTO: 12.2 % (ref 19.6–45.3)
MCH RBC QN AUTO: 32.5 PG (ref 26.9–32)
MCHC RBC AUTO-ENTMCNC: 33.3 G/DL (ref 32.4–36.3)
MCV RBC AUTO: 97.6 FL (ref 80.5–98.2)
MONOCYTES # BLD AUTO: 1.03 10*3/MM3 (ref 0.2–1.2)
MONOCYTES NFR BLD AUTO: 12 % (ref 5–12)
NEUTROPHILS # BLD AUTO: 6.46 10*3/MM3 (ref 1.9–8.1)
NEUTROPHILS NFR BLD AUTO: 75.1 % (ref 42.7–76)
PLATELET # BLD AUTO: 209 10*3/MM3 (ref 140–500)
PMV BLD AUTO: 9.9 FL (ref 6–12)
POTASSIUM BLD-SCNC: 4.1 MMOL/L (ref 3.5–5.2)
RBC # BLD AUTO: 3.29 10*6/MM3 (ref 3.9–5.2)
SODIUM BLD-SCNC: 139 MMOL/L (ref 136–145)
WBC NRBC COR # BLD: 8.61 10*3/MM3 (ref 4.5–10.7)

## 2017-06-18 PROCEDURE — 97110 THERAPEUTIC EXERCISES: CPT | Performed by: PHYSICAL THERAPIST

## 2017-06-18 PROCEDURE — 80048 BASIC METABOLIC PNL TOTAL CA: CPT | Performed by: INTERNAL MEDICINE

## 2017-06-18 PROCEDURE — 97150 GROUP THERAPEUTIC PROCEDURES: CPT

## 2017-06-18 PROCEDURE — 97162 PT EVAL MOD COMPLEX 30 MIN: CPT | Performed by: PHYSICAL THERAPIST

## 2017-06-18 PROCEDURE — 25010000002 ENOXAPARIN PER 10 MG: Performed by: ORTHOPAEDIC SURGERY

## 2017-06-18 PROCEDURE — 94799 UNLISTED PULMONARY SVC/PX: CPT

## 2017-06-18 PROCEDURE — 25010000003 CEFAZOLIN IN DEXTROSE 2-4 GM/100ML-% SOLUTION: Performed by: ORTHOPAEDIC SURGERY

## 2017-06-18 PROCEDURE — 97110 THERAPEUTIC EXERCISES: CPT

## 2017-06-18 PROCEDURE — 85025 COMPLETE CBC W/AUTO DIFF WBC: CPT | Performed by: HOSPITALIST

## 2017-06-18 RX ADMIN — HYDROCODONE BITARTRATE AND ACETAMINOPHEN 1 TABLET: 7.5; 325 TABLET ORAL at 22:19

## 2017-06-18 RX ADMIN — CEFAZOLIN SODIUM 2 G: 2 INJECTION, SOLUTION INTRAVENOUS at 05:43

## 2017-06-18 RX ADMIN — HYDROCODONE BITARTRATE AND ACETAMINOPHEN 1 TABLET: 7.5; 325 TABLET ORAL at 17:25

## 2017-06-18 RX ADMIN — ENOXAPARIN SODIUM 40 MG: 40 INJECTION SUBCUTANEOUS at 12:33

## 2017-06-18 RX ADMIN — HYDROCODONE BITARTRATE AND ACETAMINOPHEN 1 TABLET: 7.5; 325 TABLET ORAL at 10:22

## 2017-06-18 NOTE — PROGRESS NOTES
"  Name: Carla Campos  ADMIT: 2017   Age/Sex: 81 y.o.female LOS:  LOS: 1 day    :    1935     ROOM: [facility]   MRN:    [id number]    PCP:    Edgard Recinos MD     Subjective   Pain well controlled. No light-headedness or dizziness.     Objective   Vital Signs  Temp:  [97 °F (36.1 °C)-98.2 °F (36.8 °C)] 97 °F (36.1 °C)  Heart Rate:  [] 83  Resp:  [12-20] 18  BP: (103-127)/(64-79) 107/71  Body mass index is 25.37 kg/(m^2).    Objective:  General Appearance:  Comfortable and well-appearing.    Vital signs: (most recent): Blood pressure 107/71, pulse 83, temperature 97 °F (36.1 °C), temperature source Oral, resp. rate 18, height 67\" (170.2 cm), weight 162 lb (73.5 kg), SpO2 97 %.  Vital signs are normal.    HEENT: Normal HEENT exam.    Lungs:  Normal effort.  Breath sounds clear to auscultation.    Heart: Normal rate.  Regular rhythm.    Abdomen: Abdomen is soft and non-distended.  Bowel sounds are normal.   There is no abdominal tenderness.     Extremities: There is no deformity or dependent edema.    Neurological: Patient is alert and oriented to person, place and time.    Skin:  Warm and dry.  No rash.             Results Review:       I reviewed the patient's new clinical results.    Results from last 7 days  Lab Units 17  0016   WBC 10*3/mm3 8.61 12.95*   HEMOGLOBIN g/dL 10.7* 12.2   PLATELETS 10*3/mm3 209 260       Results from last 7 days  Lab Units 175 17  0016   SODIUM mmol/L 139 136   POTASSIUM mmol/L 4.1 3.8   CHLORIDE mmol/L 104 100   TOTAL CO2 mmol/L 23.3 18.9*   BUN mg/dL 11 21   CREATININE mg/dL 0.58 0.72   GLUCOSE mg/dL 139* 106*   Estimated Creatinine Clearance: 55.5 mL/min (by C-G formula based on Cr of 0.58).    Results from last 7 days  Lab Units 17 17  0016   CALCIUM mg/dL 8.1* 8.9   ALBUMIN g/dL  --  3.90       RADIOLOGY  2017  Pending      enoxaparin 40 mg Subcutaneous Daily      Diet Regular      Assessment/Plan "   Assessment:   Active Problems:    Closed left hip fracture        Plan:   - Cont pain control  - monitor H/H -- expected drop post-operatively  - cont PT and lovenox  - d/c IVF  - Home with HH tomorrow      Disposition  TBD.      Dhruv Montalvo MD  [facility]  06/18/17  1:22 PM

## 2017-06-18 NOTE — PROGRESS NOTES
Orthopedic Progress Note      Patient: Carla Campos  YOB: 1935     Date of Admission: 6/16/2017 11:01 PM Medical Record Number: [id number]     Attending Physician: Dhruv Montalvo MD    Status Post:  [place] OPEN FIX INTER/SUBTROCH FX,IMPLNT [24188] (FEMUR INTRAMEDULLARY NAILING/RODDING) Post Operative Day Number: 1    Subjective : No new orthopaedic complaints     Pain Relief: some relief with present medication.     Systemic Complaints: No Complaints  Vitals:    06/17/17 2300 06/18/17 0331 06/18/17 0700 06/18/17 1029   BP: 114/65 111/64 117/75 107/71   BP Location: Left arm Right arm Right arm Left arm   Patient Position: Lying Lying Lying Sitting   Pulse: 78 72 83 83   Resp: 18 18 18 18   Temp: 97.4 °F (36.3 °C) 97.5 °F (36.4 °C) 97.1 °F (36.2 °C) 97 °F (36.1 °C)   TempSrc: Oral Oral Oral Oral   SpO2: 97% 96% 96% 97%   Weight:       Height:           Physical Exam: 81 y.o. female    General Appearance:       Alert, cooperative, in no acute distress                  Extremities:    Dressing Clean, Dry and Intact         Incision healthy without signs or symptoms of infections         No clinical sign of DVT        Able to do good movements of digits    Pulses:   Pulses palpable and equal bilaterally           Diagnostic Tests:      Results from last 7 days  Lab Units 06/18/17  0315 06/17/17  0016   WBC 10*3/mm3 8.61 12.95*   HEMOGLOBIN g/dL 10.7* 12.2   HEMATOCRIT % 32.1* 37.1   PLATELETS 10*3/mm3 209 260       Results from last 7 days  Lab Units 06/18/17  0315 06/17/17  0016   SODIUM mmol/L 139 136   POTASSIUM mmol/L 4.1 3.8   CHLORIDE mmol/L 104 100   TOTAL CO2 mmol/L 23.3 18.9*   BUN mg/dL 11 21   CREATININE mg/dL 0.58 0.72   GLUCOSE mg/dL 139* 106*   CALCIUM mg/dL 8.1* 8.9       Results from last 7 days  Lab Units 06/17/17  0016   INR  1.06       Xr Chest 1 View    Result Date: 6/17/2017  Narrative: 2 VIEWS OF THE LEFT HIP, SINGLE VIEW OF THE PELVIS.  HISTORY: Hip fracture.  COMPARISON: No  prior studies for comparison.  FINDINGS: There is an intertrochanteric displaced fracture of the left proximal femur, avulsion of the lesser trochanter.      Soft tissue swelling on the left.      Impression: Intertrochanteric fracture of the left proximal femur.  ----------------------------------------------------------------  X-RAY CHEST 1 VIEW.  HISTORY: Hip fracture.  COMPARISON: No prior studies for comparison.  FINDINGS: Cardiomediastinal silhouette is within normal limits.     There is no consolidation or effusion.      IMPRESSION: No acute findings.     This report was finalized on 6/17/2017 7:59 PM by Dr. Lachelle Flores MD.      Fl C Arm During Surgery    Result Date: 6/17/2017  Narrative: This procedure was auto-finalized with no dictation required.    Xr Hip With Or Without Pelvis 1 View Left    Result Date: 6/17/2017  Narrative: AP PELVIS AND LEFT HIP 06/17/2017.  HISTORY: Status post hip nailing procedure.  There is intramedullary carmen extending from the greater trochanter to the proximal to mid shaft of the femur with surgical nail coursing through the carmen into the femoral head and neck. This supports the intertrochanteric fracture of the proximal left femur shows mild displacement. Humeral head articulates with the acetabulum.  No unexpected findings are noted.      Impression: 1. Satisfactory postoperative appearance of the left hip.  This report was finalized on 6/17/2017 3:33 PM by Dr. Abdi Rocha MD.      Xr Hip With Or Without Pelvis 2 - 3 View Left    Result Date: 6/17/2017  Narrative: 2 VIEWS OF THE LEFT HIP, SINGLE VIEW OF THE PELVIS.  HISTORY: Hip fracture.  COMPARISON: No prior studies for comparison.  FINDINGS: There is an intertrochanteric displaced fracture of the left proximal femur, avulsion of the lesser trochanter.      Soft tissue swelling on the left.      Impression: Intertrochanteric fracture of the left proximal femur.   ----------------------------------------------------------------  X-RAY CHEST 1 VIEW.  HISTORY: Hip fracture.  COMPARISON: No prior studies for comparison.  FINDINGS: Cardiomediastinal silhouette is within normal limits.     There is no consolidation or effusion.      IMPRESSION: No acute findings.     This report was finalized on 6/17/2017 7:59 PM by Dr. Lachelle Flores MD.      Xr Hip With Or Without Pelvis 2 - 3 View Left    Result Date: 6/17/2017  Narrative: LEFT HIP AND PELVIS 5 VIEWS INTRAOPERATIVE IMAGING  HISTORY: 81-year-old female with intertrochanteric fracture proximal left femur  COMPARISON: 06/16/2017  FINDINGS: 1. C-arm generated imaging and fluoroscopic guidance provided intraoperatively for localization purposes. 2. Satisfactory appearance following compression nail fixation proximal left femoral fracture  FLUOROSCOPY TIME: 1 minute 8 seconds, 5 images  This report was finalized on 6/17/2017 2:43 PM by Dr. Maxim Olvera MD.          Current Medications:  Scheduled Meds:  enoxaparin 40 mg Subcutaneous Daily     Continuous Infusions:  lactated ringers 100 mL/hr Last Rate: 100 mL/hr (06/17/17 1651)   sodium chloride 100 mL/hr Last Rate: 100 mL/hr (06/17/17 0613)     PRN Meds:.•  bisacodyl  •  docusate sodium  •  HYDROcodone-acetaminophen  •  Morphine  •  ondansetron  •  sodium chloride    Assessment: Status post  [place] OPEN FIX INTER/SUBTROCH FX,IMPLNT [70771] (FEMUR INTRAMEDULLARY NAILING/RODDING)    Patient Active Problem List   Diagnosis   • Closed left hip fracture       PLAN:   Continues current post-op course  Mobilize with PT as tolerated per protocol  DVT prophylaxis Lovenox  Started, patient was refusing I discussed importance of using the medication  Dressing change in am    Weight Bearing: WBAT  Discharge Plan: OK to plan for discharge in  tomorrow to home and home health  from orthopadic perspective.    Zabrina Andrews MD    Date: 6/18/2017    Time: 12:59 PM    EMR Dragon/Transcription  disclaimer:   Much of this encounter note is an electronic transcription/translation of spoken language to printed text. The electronic translation of spoken language may permit erroneous, or at times, nonsensical words or phrases to be inadvertently transcribed; Although I have reviewed the note for such errors, some may still exist.

## 2017-06-18 NOTE — PROGRESS NOTES
Continued Stay Note  [facility]     Patient Name: Carla Campos  MRN: [id number]  Today's Date: 6/18/2017    Admit Date: 6/16/2017          Discharge Plan       06/18/17 1253    Case Management/Social Work Plan    Plan Home with [facility]..............James TONY     Patient/Family In Agreement With Plan yes    Additional Comments Call from [facility] , states they cannot accept [place] Medicare Replacement plan at this time. Patient informed and alternative choice for home health is [facility]. Call to [facility]  with referral. [facility]  states they will follow.........James TONY       06/18/17 1131    Case Management/Social Work Plan    Plan Plan home with [facility] , Anticipate DC Monday...........James TONY     Patient/Family In Agreement With Plan yes    Additional Comments Met with patient at bedside, introduced self and role. Pt. lives with spouse in a ss house with 4 steps to enter. Face sheet address is correct billing address of a P.O. box, physical address is [address] [id number]. Pt. uses a cane at times prior to admit, states  going to pick-up walker today for use after DC. Plans to DC home with [facility] who she has used in the past. Call to [facility] with referral, will follow. CCP will follow should additional needs arise..........James TONY               Discharge Codes     None            Yris Saenz RN

## 2017-06-18 NOTE — THERAPY EVALUATION
Acute Care - Physical Therapy Initial Evaluation  [facility]     Patient Name: Carla Campos  : 1935  MRN: [id number]  Today's Date: 2017                Admit Date: 2017     Visit Dx:    ICD-10-CM ICD-9-CM   1. Closed left hip fracture, initial encounter S72.002A 820.8   2. Displaced intertrochanteric fracture of left femur, initial encounter for closed fracture S72.142A 820.21   3. Difficulty walking R26.2 719.7     Patient Active Problem List   Diagnosis   • Closed left hip fracture     History reviewed. No pertinent past medical history.  Past Surgical History:   Procedure Laterality Date   • BLADDER REPAIR     • COLONOSCOPY     • HYSTERECTOMY     • TUBAL ABDOMINAL LIGATION Bilateral     age mid twenties          PT ASSESSMENT (last 72 hours)      PT Evaluation       17 0950 17 0303    Rehab Evaluation    Document Type evaluation  -     Subjective Information agree to therapy;complains of;pain  -KH     Patient Effort, Rehab Treatment good  -     Symptoms Noted During/After Treatment increased pain  -KH     General Information    General Observations in chair  -     Pertinent History Of Current Problem s/p L hip fracture  -     Precautions/Limitations fall precautions  -     Prior Level of Function independent:  -KH     Equipment Currently Used at Home none  -KH walker, standard;raised toilet  -QN    Plans/Goals Discussed With patient  -     Living Environment    Number of Stairs to Enter Home 4  -KH     Transportation Available  car;family or friend will provide  -QN    Clinical Impression    Patient/Family Goals Statement return home  -     Criteria for Skilled Therapeutic Interventions Met yes;treatment indicated  -     Impairments Found (describe specific impairments) gait, locomotion, and balance  -     Rehab Potential good, to achieve stated therapy goals  -     Pain Assessment    Pain Assessment 0-10  -     Post Pain Score 4  -KH     Pain Location Hip   -     Pain Orientation Left  -     Pain Intervention(s) Repositioned;Ambulation/increased activity  -     Cognitive Assessment/Intervention    Current Cognitive/Communication Assessment functional  -     Orientation Status oriented x 4  -     Follows Commands/Answers Questions 100% of the time  -     Personal Safety WNL/WFL  -     ROM (Range of Motion)    General ROM Detail WFL, except L hip  -     MMT (Manual Muscle Testing)    General MMT Assessment Detail WFL  -     Mobility Assessment/Training    Extremity Weight-Bearing Status left lower extremity  -     Left Lower Extremity Weight-Bearing weight-bearing as tolerated  -     Bed Mobility, Assessment/Treatment    Bed Mob, Supine to Sit, [place] not tested  -     Bed Mob, Sit to Supine, [place] not tested  -     Bed Mobility, Comment up in chair  -     Transfer Assessment/Treatment    Transfers, Sit-Stand [place] contact guard assist  -     Transfers, Stand-Sit [place] contact guard assist  -     Transfers, Sit-Stand-Sit, Assist Device rolling walker  -     Gait Assessment/Treatment    Gait, [place] Level contact guard assist  -     Gait, Assistive Device rolling walker  -     Gait, Distance (Feet) 30  -     Gait, Gait Deviations antalgic;sheron decreased;step length decreased  -     Gait, Impairments pain  -     Therapy Exercises    Exercise Protocols total hip  -     Total Hip Exercises left:;10 reps;completed protocol;with assist  -     Positioning and Restraints    Pre-Treatment Position sitting in chair/recliner  -     Post Treatment Position chair  -     In Chair reclined;call light within reach;encouraged to call for assist;notified ns  [place]       06/17/17 3866       General Information    Equipment Currently Used at Home none  -DJ     Living Environment    Lives With spouse  -DJ     Living Arrangements house  -DJ     Home Accessibility no concerns  -DJ     Stair Railings at Home  none;other (see comments)   1 story house  -DJ     Type of Financial/Environmental Concern none  -DJ     Transportation Available family or friend will provide  -DJ       User Key  (r) = Recorded By, (t) = Taken By, (c) = Cosigned By    Initials Name Provider Type     Lindsay Rain, PT Physical Therapist    ZA Hernandez, RN Registered Nurse    PAUL Danielle RN Registered Nurse          Physical Therapy Education     Title: PT OT SLP Therapies (Done)     Topic: Physical Therapy (Done)     Point: Mobility training (Done)    Learning Progress Summary    Learner Readiness Method Response Comment Documented by Status   Patient Acceptance E ,NR   06/18/17 0954 Done               Point: Home exercise program (Done)    Learning Progress Summary    Learner Readiness Method Response Comment Documented by Status   Patient Acceptance E ,[facility] 06/18/17 0954 Done               Point: Precautions (Done)    Learning Progress Summary    Learner Readiness Method Response Comment Documented by Status   Patient Acceptance E ,NR   06/18/17 0954 Done                      User Key     Initials Effective Dates Name Provider Type Discipline     05/18/15 -  Lindsay Rain, PT Physical Therapist PT                PT Recommendation and Plan  Anticipated Discharge Disposition: home with home health  Planned Therapy Interventions: balance training, bed mobility training, gait training, home exercise program, ROM (Range of Motion), transfer training, strengthening, stair training  PT Frequency: 2 times/day  Plan of Care Review  Outcome Summary/Follow up Plan: (P) Pt presents with pain and limited mobility and would benefit fromPT to address strengthening, ROM gait and stair training.          IP PT Goals       06/18/17 0954          Bed Mobility PT LTG    Bed Mobility PT LTG, Date Established (P)  06/18/17  -      Bed Mobility PT LTG, Time to Achieve (P)  5 days  -      Bed Mobility PT LTG, Activity  Type (P)  all bed mobility  -KH      Bed Mobility PT LTG, [place] Level (P)  independent  -KH      Transfer Training PT LTG    Transfer Training PT LTG, Date Established (P)  06/18/17  -      Transfer Training PT LTG, Activity Type (P)  all transfers  -KH      Transfer Training PT LTG, [place] Level (P)  supervision required  -KH      Transfer Training PT LTG, Assist Device (P)  walker, rolling  -KH      Gait Training PT LTG    Gait Training Goal PT LTG, Date Established (P)  06/18/17  -      Gait Training Goal PT LTG, Time to Achieve (P)  5 days  -KH      Gait Training Goal PT LTG, [place] Level (P)  supervision required  -KH      Gait Training Goal PT LTG, Assist Device (P)  walker, rolling  -KH      Gait Training Goal PT LTG, Distance to Achieve (P)  150 ft  -KH      Stair Training PT LTG    Stair Training Goal PT LTG, Date Established (P)  06/18/17  -KH      Stair Training Goal PT LTG, Time to Achieve (P)  5 days  -KH      Stair Training Goal PT LTG, Number of Steps (P)  4  -KH      Stair Training Goal PT LTG, [place] Level (P)  contact guard assist  -      Patient Education PT LTG    Patient Education PT LTG, Date Established (P)  06/18/17  -      Patient Education PT LTG, Time to Achieve (P)  5 days  -      Patient Education PT LTG, Education Type (P)  HEP;precaution per surgeon  -      Patient Education PT LTG, Education Understanding (P)  demonstrate adequately  -        User Key  (r) = Recorded By, (t) = Taken By, (c) = Cosigned By    Initials Name Provider Type    CEE Rain, PT Physical Therapist                Outcome Measures       06/18/17 1000          How much help from another person do you currently need...    Turning from your back to your side while in flat bed without using bedrails? 3  -KH      Moving from lying on back to sitting on the side of a flat bed without bedrails? 3  -KH      Moving to and from a bed to a chair (including a wheelchair)?  3  -KH      Standing up from a chair using your arms (e.g., wheelchair, bedside chair)? 3  -KH      Climbing 3-5 steps with a railing? 3  -KH      To walk in hospital room? 3  -KH      AM-PAC 6 Clicks Score 18  -KH      Functional Assessment    Outcome Measure Options AM-PAC 6 Clicks Basic Mobility (PT)  -        User Key  (r) = Recorded By, (t) = Taken By, (c) = Cosigned By    Initials Name Provider Type    CEE Rain, PT Physical Therapist           Time Calculation:         PT Charges       06/18/17 1019          Time Calculation    Start Time 0945  -      Stop Time 1015  -      Time Calculation (min) 30 min  -      PT Received On 06/18/17  -CEE      PT - Next Appointment 06/18/17  -CEE      PT Goal Re-Cert Due Date 06/23/17  -        User Key  (r) = Recorded By, (t) = Taken By, (c) = Cosigned By    Initials Name Provider Type    CEE Rain, PT Physical Therapist          Therapy Charges for Today     Code Description Service Date Service Provider Modifiers Qty    16398356327 HC PT EVAL MOD COMPLEXITY 2 6/18/2017 Lindsay Rain, PT GP 1    46443787297 HC PT THER PROC EA 15 MIN 6/18/2017 Lindsay Rain, PT GP 1          PT G-Codes  Outcome Measure Options: AM-PAC 6 Clicks Basic Mobility (PT)      Lindsay Rain, PT  6/18/2017

## 2017-06-18 NOTE — PLAN OF CARE
Problem: Patient Care Overview (Adult)  Goal: Plan of Care Review  Outcome: Ongoing (interventions implemented as appropriate)    06/18/17 0303   Coping/Psychosocial Response Interventions   Plan Of Care Reviewed With patient   Patient Care Overview   Progress improving   Outcome Evaluation   Outcome Summary/Follow up Plan po pain medication controlling pain. up assist x2 to the chair. smith still in place.       Goal: Adult Individualization and Mutuality  Outcome: Ongoing (interventions implemented as appropriate)  Goal: Discharge Needs Assessment  Outcome: Ongoing (interventions implemented as appropriate)    Problem: Fall Risk (Adult)  Goal: Absence of Falls  Outcome: Ongoing (interventions implemented as appropriate)    Problem: Fractured Hip (Adult)  Goal: Signs and Symptoms of Listed Potential Problems Will be Absent or Manageable (Fractured Hip)  Outcome: Ongoing (interventions implemented as appropriate)    Problem: Pain, Acute (Adult)  Goal: Identify Related Risk Factors and Signs and Symptoms  Outcome: Outcome(s) achieved Date Met:  06/18/17  Goal: Acceptable Pain Control/Comfort Level  Outcome: Ongoing (interventions implemented as appropriate)

## 2017-06-18 NOTE — THERAPY EVALUATION
Acute Care - Physical Therapy Initial Evaluation  [facility]     Patient Name: Carla Campos  : 1935  MRN: [id number]  Today's Date: 2017                Admit Date: 2017     Visit Dx:    ICD-10-CM ICD-9-CM   1. Closed left hip fracture, initial encounter S72.002A 820.8   2. Displaced intertrochanteric fracture of left femur, initial encounter for closed fracture S72.142A 820.21   3. Difficulty walking R26.2 719.7     Patient Active Problem List   Diagnosis   • Closed left hip fracture     History reviewed. No pertinent past medical history.  Past Surgical History:   Procedure Laterality Date   • BLADDER REPAIR     • COLONOSCOPY     • HYSTERECTOMY     • TUBAL ABDOMINAL LIGATION Bilateral     age mid twenties          PT ASSESSMENT (last 72 hours)      PT Evaluation       17 0950 17 0303    Rehab Evaluation    Document Type evaluation  -     Subjective Information agree to therapy;complains of;pain  -KH     Patient Effort, Rehab Treatment good  -     Symptoms Noted During/After Treatment increased pain  -KH     General Information    General Observations in chair  -     Pertinent History Of Current Problem s/p L hip fracture  -     Precautions/Limitations fall precautions  -     Prior Level of Function independent:  -KH     Equipment Currently Used at Home none  -KH walker, standard;raised toilet  -QN    Plans/Goals Discussed With patient  -     Living Environment    Number of Stairs to Enter Home 4  -KH     Transportation Available  car;family or friend will provide  -QN    Clinical Impression    Patient/Family Goals Statement return home  -     Criteria for Skilled Therapeutic Interventions Met yes;treatment indicated  -     Impairments Found (describe specific impairments) gait, locomotion, and balance  -     Rehab Potential good, to achieve stated therapy goals  -     Pain Assessment    Pain Assessment 0-10  -     Post Pain Score 4  -KH     Pain Location Hip   -     Pain Orientation Left  -     Pain Intervention(s) Repositioned;Ambulation/increased activity  -     Cognitive Assessment/Intervention    Current Cognitive/Communication Assessment functional  -     Orientation Status oriented x 4  -     Follows Commands/Answers Questions 100% of the time  -     Personal Safety WNL/WFL  -     ROM (Range of Motion)    General ROM Detail WFL, except L hip  -     MMT (Manual Muscle Testing)    General MMT Assessment Detail WFL  -     Mobility Assessment/Training    Extremity Weight-Bearing Status left lower extremity  -     Left Lower Extremity Weight-Bearing weight-bearing as tolerated  -     Bed Mobility, Assessment/Treatment    Bed Mob, Supine to Sit, [place] not tested  -     Bed Mob, Sit to Supine, [place] not tested  -     Bed Mobility, Comment up in chair  -     Transfer Assessment/Treatment    Transfers, Sit-Stand [place] contact guard assist  -     Transfers, Stand-Sit [place] contact guard assist  -     Transfers, Sit-Stand-Sit, Assist Device rolling walker  -     Gait Assessment/Treatment    Gait, [place] Level contact guard assist  -     Gait, Assistive Device rolling walker  -     Gait, Distance (Feet) 30  -     Gait, Gait Deviations antalgic;sheron decreased;step length decreased  -     Gait, Impairments pain  -     Therapy Exercises    Exercise Protocols total hip  -     Total Hip Exercises left:;10 reps;completed protocol;with assist  -     Positioning and Restraints    Pre-Treatment Position sitting in chair/recliner  -     Post Treatment Position chair  -     In Chair reclined;call light within reach;encouraged to call for assist;notified ns  [place]       06/17/17 2682       General Information    Equipment Currently Used at Home none  -DJ     Living Environment    Lives With spouse  -DJ     Living Arrangements house  -DJ     Home Accessibility no concerns  -DJ     Stair Railings at Home  none;other (see comments)   1 story house  -DJ     Type of Financial/Environmental Concern none  -DJ     Transportation Available family or friend will provide  -DJ       User Key  (r) = Recorded By, (t) = Taken By, (c) = Cosigned By    Initials Name Provider Type     Lindsay Rain, PT Physical Therapist    ZA Hernandez, RN Registered Nurse    PAUL Danielle RN Registered Nurse          Physical Therapy Education     Title: PT OT SLP Therapies (Done)     Topic: Physical Therapy (Done)     Point: Mobility training (Done)    Learning Progress Summary    Learner Readiness Method Response Comment Documented by Status   Patient Acceptance E ,NR   06/18/17 0954 Done               Point: Home exercise program (Done)    Learning Progress Summary    Learner Readiness Method Response Comment Documented by Status   Patient Acceptance E ,[facility] 06/18/17 0954 Done               Point: Precautions (Done)    Learning Progress Summary    Learner Readiness Method Response Comment Documented by Status   Patient Acceptance E ,NR   06/18/17 0954 Done                      User Key     Initials Effective Dates Name Provider Type Discipline     05/18/15 -  Lindsay Rain, PT Physical Therapist PT                PT Recommendation and Plan  Anticipated Discharge Disposition: home with home health  Planned Therapy Interventions: balance training, bed mobility training, gait training, home exercise program, ROM (Range of Motion), transfer training, strengthening, stair training  PT Frequency: 2 times/day  Plan of Care Review  Outcome Summary/Follow up Plan: (P) Pt presents with pain and limited mobility and would benefit fromPT to address strengthening, ROM gait and stair training.          IP PT Goals       06/18/17 0954          Bed Mobility PT LTG    Bed Mobility PT LTG, Date Established (P)  06/18/17  -      Bed Mobility PT LTG, Time to Achieve (P)  5 days  -      Bed Mobility PT LTG, Activity  Type (P)  all bed mobility  -KH      Bed Mobility PT LTG, [place] Level (P)  independent  -KH      Transfer Training PT LTG    Transfer Training PT LTG, Date Established (P)  06/18/17  -      Transfer Training PT LTG, Activity Type (P)  all transfers  -KH      Transfer Training PT LTG, [place] Level (P)  supervision required  -KH      Transfer Training PT LTG, Assist Device (P)  walker, rolling  -KH      Gait Training PT LTG    Gait Training Goal PT LTG, Date Established (P)  06/18/17  -      Gait Training Goal PT LTG, Time to Achieve (P)  5 days  -KH      Gait Training Goal PT LTG, [place] Level (P)  supervision required  -KH      Gait Training Goal PT LTG, Assist Device (P)  walker, rolling  -KH      Gait Training Goal PT LTG, Distance to Achieve (P)  150 ft  -KH      Stair Training PT LTG    Stair Training Goal PT LTG, Date Established (P)  06/18/17  -KH      Stair Training Goal PT LTG, Time to Achieve (P)  5 days  -KH      Stair Training Goal PT LTG, Number of Steps (P)  4  -KH      Stair Training Goal PT LTG, [place] Level (P)  contact guard assist  -      Patient Education PT LTG    Patient Education PT LTG, Date Established (P)  06/18/17  -      Patient Education PT LTG, Time to Achieve (P)  5 days  -      Patient Education PT LTG, Education Type (P)  HEP;precaution per surgeon  -      Patient Education PT LTG, Education Understanding (P)  demonstrate adequately  -        User Key  (r) = Recorded By, (t) = Taken By, (c) = Cosigned By    Initials Name Provider Type    CEE Rain, PT Physical Therapist                Outcome Measures       06/18/17 1000          How much help from another person do you currently need...    Turning from your back to your side while in flat bed without using bedrails? 3  -KH      Moving from lying on back to sitting on the side of a flat bed without bedrails? 3  -KH      Moving to and from a bed to a chair (including a wheelchair)?  3  -KH      Standing up from a chair using your arms (e.g., wheelchair, bedside chair)? 3  -KH      Climbing 3-5 steps with a railing? 3  -KH      To walk in hospital room? 3  -KH      AM-PAC 6 Clicks Score 18  -KH      Functional Assessment    Outcome Measure Options AM-PAC 6 Clicks Basic Mobility (PT)  -        User Key  (r) = Recorded By, (t) = Taken By, (c) = Cosigned By    Initials Name Provider Type    CEE Rain, PT Physical Therapist           Time Calculation:         PT Charges       06/18/17 1019          Time Calculation    Start Time 0945  -      Stop Time 1015  -      Time Calculation (min) 30 min  -      PT Received On 06/18/17  -CEE      PT - Next Appointment 06/18/17  -CEE      PT Goal Re-Cert Due Date 06/23/17  -        User Key  (r) = Recorded By, (t) = Taken By, (c) = Cosigned By    Initials Name Provider Type    CEE Rain, PT Physical Therapist          Therapy Charges for Today     Code Description Service Date Service Provider Modifiers Qty    42434705550 HC PT EVAL MOD COMPLEXITY 2 6/18/2017 Lindsay Rain, PT GP 1    69882496797 HC PT THER PROC EA 15 MIN 6/18/2017 Lindsay Rain, PT GP 1          PT G-Codes  Outcome Measure Options: AM-PAC 6 Clicks Basic Mobility (PT)      Lindsay Rain, PT  6/18/2017

## 2017-06-18 NOTE — PLAN OF CARE
Problem: Patient Care Overview (Adult)  Goal: Plan of Care Review  Outcome: Ongoing (interventions implemented as appropriate)    06/18/17 0049   Coping/Psychosocial Response Interventions   Plan Of Care Reviewed With patient   Patient Care Overview   Progress improving   Outcome Evaluation   Outcome Summary/Follow up Plan Pain being controlled with PO pain med. Med given X 1 dose. Refused Levonex at original time due. Discussed the potential complications if she does not take the medication. Agreed to Levonex this afternoon.Taking fluids well - IV flds discontinued. Voided post removal of smith cath. Lab work scheduled for am         Problem: Fall Risk (Adult)  Goal: Absence of Falls  Outcome: Ongoing (interventions implemented as appropriate)    Problem: Fractured Hip (Adult)  Goal: Signs and Symptoms of Listed Potential Problems Will be Absent or Manageable (Fractured Hip)  Outcome: Ongoing (interventions implemented as appropriate)    Problem: Pain, Acute (Adult)  Goal: Acceptable Pain Control/Comfort Level  Outcome: Ongoing (interventions implemented as appropriate)

## 2017-06-18 NOTE — PLAN OF CARE
Problem: Patient Care Overview (Adult)  Goal: Plan of Care Review    06/18/17 0954   Outcome Evaluation   Outcome Summary/Follow up Plan Pt presents with pain and limited mobility and would benefit fromPT to address strengthening, ROM gait and stair training.         Problem: Inpatient Physical Therapy  Goal: Bed Mobility Goal LTG- PT    06/18/17 0954   Bed Mobility PT LTG   Bed Mobility PT LTG, Date Established 06/18/17   Bed Mobility PT LTG, Time to Achieve 5 days   Bed Mobility PT LTG, Activity Type all bed mobility   Bed Mobility PT LTG, [place] Level independent       Goal: Transfer Training Goal 1 LTG- PT    06/18/17 0954   Transfer Training PT LTG   Transfer Training PT LTG, Date Established 06/18/17   Transfer Training PT LTG, Activity Type all transfers   Transfer Training PT LTG, [place] Level supervision required   Transfer Training PT LTG, Assist Device walker, rolling       Goal: Gait Training Goal LTG- PT    06/18/17 0954   Gait Training PT LTG   Gait Training Goal PT LTG, Date Established 06/18/17   Gait Training Goal PT LTG, Time to Achieve 5 days   Gait Training Goal PT LTG, [place] Level supervision required   Gait Training Goal PT LTG, Assist Device walker, rolling   Gait Training Goal PT LTG, Distance to Achieve 150 ft       Goal: Stair Training Goal LTG- PT    06/18/17 0954   Stair Training PT LTG   Stair Training Goal PT LTG, Date Established 06/18/17   Stair Training Goal PT LTG, Time to Achieve 5 days   Stair Training Goal PT LTG, Number of Steps 4   Stair Training Goal PT LTG, [place] Level contact guard assist       Goal: Patient Education Goal LTG- PT    06/18/17 0954   Patient Education PT LTG   Patient Education PT LTG, Date Established 06/18/17   Patient Education PT LTG, Time to Achieve 5 days   Patient Education PT LTG, Education Type HEP;precaution per surgeon   Patient Education PT LTG, Education Understanding demonstrate adequately

## 2017-06-18 NOTE — THERAPY TREATMENT NOTE
Acute Care - Physical Therapy Treatment Note  [facility]     Patient Name: Carla Campos  : 1935  MRN: [id number]  Today's Date: 2017             Admit Date: 2017    Visit Dx:    ICD-10-CM ICD-9-CM   1. Closed left hip fracture, initial encounter S72.002A 820.8   2. Displaced intertrochanteric fracture of left femur, initial encounter for closed fracture S72.142A 820.21   3. Difficulty walking R26.2 719.7     Patient Active Problem List   Diagnosis   • Closed left hip fracture               Adult Rehabilitation Note       17 1336          Rehab Assessment/Intervention    Discipline physical therapy assistant  -      Document Type therapy note (daily note)  -      Subjective Information agree to therapy;complains of;weakness;fatigue;pain  -      Precautions/Limitations fall precautions  -      Recorded by [] Reta Martinez PTA      Pain Assessment    Pain Assessment 0-10  -      Pain Score 3  -      Post Pain Score 4  -      Pain Location Hip  -      Pain Orientation Left  -      Pain Intervention(s) Medication (See MAR);Repositioned;Cold applied  -      Recorded by [JM] Reta Martinez PTA      Bed Mobility, Assessment/Treatment    Bed Mob, Supine to Sit, [place] contact guard assist;verbal cues required  -      Recorded by [JM] Reta Martinez PTA      Transfer Assessment/Treatment    Transfers, Sit-Stand [place] contact guard assist;verbal cues required  -      Transfers, Stand-Sit [place] stand by assist;verbal cues required  -      Transfers, Sit-Stand-Sit, Assist Device rolling walker  -      Recorded by [] Reta Martinez PTA      Gait Assessment/Treatment    Gait, [place] Level contact guard assist;verbal cues required  -      Gait, Assistive Device rolling walker  -      Gait, Distance (Feet) 40  -      Gait, Gait Deviations antalgic;sheron decreased;limb motion velocity decreased;step length decreased  -       Gait, Impairments pain;strength decreased  -      Gait, Comment difficulty advancing left le due to pn /wkness  -      Recorded by [LORIE] Reta Martinez PTA      Stairs Assessment/Treatment    Number of Stairs 4  -      Stairs, Handrail Location both sides   will have one plus cane as another option at home  -      Stairs, [place] Level contact guard assist;verbal cues required  -      Stairs, Technique Used step to step (ascending);step to step (descending)  -      Stairs, Impairments strength decreased;pain  -      Stairs, Comment slow paced but joanna  -      Recorded by [LORIE] Reta Martinez PTA      Therapy Exercises    Exercise Protocols hip ORIF  -      Hip ORIF Exercises left:;15 reps;completed protocol;with assist;hip abduction  -      Recorded by [LORIE] Reta Martinez PTA      Positioning and Restraints    Pre-Treatment Position sitting in chair/recliner  -      In Chair reclined;call light within reach;encouraged to call for assist;with family/caregiver  -      Recorded by [LORIE] Reta Martinez PTA        User Key  (r) = Recorded By, (t) = Taken By, (c) = Cosigned By    Initials Name Effective Dates    LORIE Martinez PTA 02/18/16 -                 IP PT Goals       06/18/17 0954          Bed Mobility PT LTG    Bed Mobility PT LTG, Date Established (P)  06/18/17  -      Bed Mobility PT LTG, Time to Achieve (P)  5 days  -      Bed Mobility PT LTG, Activity Type (P)  all bed mobility  -      Bed Mobility PT LTG, [place] Level (P)  independent  -      Transfer Training PT LTG    Transfer Training PT LTG, Date Established (P)  06/18/17  -      Transfer Training PT LTG, Activity Type (P)  all transfers  -      Transfer Training PT LTG, [place] Level (P)  supervision required  -      Transfer Training PT LTG, Assist Device (P)  walker, rolling  -      Gait Training PT LTG    Gait Training Goal PT LTG, Date Established (P)  06/18/17  -      Gait Training  Goal PT LTG, Time to Achieve (P)  5 days  -      Gait Training Goal PT LTG, [place] Level (P)  supervision required  -      Gait Training Goal PT LTG, Assist Device (P)  walker, rolling  -      Gait Training Goal PT LTG, Distance to Achieve (P)  150 ft  -      Stair Training PT LTG    Stair Training Goal PT LTG, Date Established (P)  06/18/17  -      Stair Training Goal PT LTG, Time to Achieve (P)  5 days  -      Stair Training Goal PT LTG, Number of Steps (P)  4  -      Stair Training Goal PT LTG, [place] Level (P)  contact guard assist  -      Patient Education PT LTG    Patient Education PT LTG, Date Established (P)  06/18/17  -      Patient Education PT LTG, Time to Achieve (P)  5 days  -      Patient Education PT LTG, Education Type (P)  HEP;precaution per surgeon  -      Patient Education PT LTG, Education Understanding (P)  demonstrate adequately  -        User Key  (r) = Recorded By, (t) = Taken By, (c) = Cosigned By    Initials Name Provider Type     Lindsay Rain, PT Physical Therapist          Physical Therapy Education     Title: PT OT SLP Therapies (Done)     Topic: Physical Therapy (Done)     Point: Mobility training (Done)    Learning Progress Summary    Learner Readiness Method Response Comment Documented by Status   Patient Acceptance E VU,NR   06/18/17 0954 Done               Point: Home exercise program (Done)    Learning Progress Summary    Learner Readiness Method Response Comment Documented by Status   Patient Acceptance E CLIFF,NR   06/18/17 0954 Done               Point: Precautions (Done)    Learning Progress Summary    Learner Readiness Method Response Comment Documented by Status   Patient Acceptance E VU,NR   06/18/17 0954 Done                      User Key     Initials Effective Dates Name Provider Type Discipline     05/18/15 -  Lindsay Rain, PT Physical Therapist PT                    PT Recommendation and Plan  Anticipated  Discharge Disposition: home with home health  Planned Therapy Interventions: balance training, bed mobility training, gait training, home exercise program, ROM (Range of Motion), transfer training, strengthening, stair training  PT Frequency: 2 times/day             Outcome Measures       06/18/17 1000          How much help from another person do you currently need...    Turning from your back to your side while in flat bed without using bedrails? 3  -KH      Moving from lying on back to sitting on the side of a flat bed without bedrails? 3  -KH      Moving to and from a bed to a chair (including a wheelchair)? 3  -KH      Standing up from a chair using your arms (e.g., wheelchair, bedside chair)? 3  -KH      Climbing 3-5 steps with a railing? 3  -KH      To walk in hospital room? 3  -KH      AM-PAC 6 Clicks Score 18  -KH      Functional Assessment    Outcome Measure Options AM-PAC 6 Clicks Basic Mobility (PT)  -        User Key  (r) = Recorded By, (t) = Taken By, (c) = Cosigned By    Initials Name Provider Type    CEE Rain, PT Physical Therapist           Time Calculation:         PT Charges       06/18/17 1426 06/18/17 1019       Time Calculation    Start Time 1320  - 0945  -     Stop Time 1415  - 1015  -     Time Calculation (min) 55 min  -LORIE 30 min  -CEE     PT Received On 06/18/17  - 06/18/17  -CEE     PT - Next Appointment 06/19/17  - 06/18/17  -CEE     PT Goal Re-Cert Due Date  06/23/17  -CEE       User Key  (r) = Recorded By, (t) = Taken By, (c) = Cosigned By    Initials Name Provider Type    CEE Rain, LO Physical Therapist    LORIE Martinez PTA Physical Therapy Assistant          Therapy Charges for Today     Code Description Service Date Service Provider Modifiers Qty    11186874479 HC PT THER PROC EA 15 MIN 6/18/2017 Reta Martinez PTA GP 2    16309450725 HC PT THER PROC GROUP 6/18/2017 Reta Martinez PTA GP 1          PT G-Codes  Outcome Measure  Options: AM-PAC 6 Clicks Basic Mobility (PT)    Reta Maritnez, PTA  6/18/2017

## 2017-06-19 VITALS
HEIGHT: 67 IN | OXYGEN SATURATION: 95 % | SYSTOLIC BLOOD PRESSURE: 99 MMHG | TEMPERATURE: 98.1 F | BODY MASS INDEX: 25.43 KG/M2 | RESPIRATION RATE: 16 BRPM | HEART RATE: 88 BPM | WEIGHT: 162 LBS | DIASTOLIC BLOOD PRESSURE: 59 MMHG

## 2017-06-19 LAB
HCT VFR BLD AUTO: 31 % (ref 35.6–45.5)
HGB BLD-MCNC: 10.2 G/DL (ref 11.9–15.5)

## 2017-06-19 PROCEDURE — 85018 HEMOGLOBIN: CPT | Performed by: INTERNAL MEDICINE

## 2017-06-19 PROCEDURE — 25010000002 ENOXAPARIN PER 10 MG: Performed by: ORTHOPAEDIC SURGERY

## 2017-06-19 PROCEDURE — 97150 GROUP THERAPEUTIC PROCEDURES: CPT

## 2017-06-19 PROCEDURE — 85014 HEMATOCRIT: CPT | Performed by: INTERNAL MEDICINE

## 2017-06-19 PROCEDURE — 97110 THERAPEUTIC EXERCISES: CPT

## 2017-06-19 PROCEDURE — 94799 UNLISTED PULMONARY SVC/PX: CPT

## 2017-06-19 RX ORDER — TRAMADOL HYDROCHLORIDE 50 MG/1
25 TABLET ORAL EVERY 4 HOURS PRN
Status: DISCONTINUED | OUTPATIENT
Start: 2017-06-19 | End: 2017-06-19 | Stop reason: HOSPADM

## 2017-06-19 RX ORDER — TRAMADOL HYDROCHLORIDE 50 MG/1
25 TABLET ORAL EVERY 4 HOURS PRN
Qty: 30 TABLET | Refills: 0 | Status: SHIPPED | OUTPATIENT
Start: 2017-06-19 | End: 2017-06-29

## 2017-06-19 RX ORDER — PSEUDOEPHEDRINE HCL 30 MG
100 TABLET ORAL 2 TIMES DAILY PRN
Qty: 60 CAPSULE | Refills: 1 | Status: SHIPPED | OUTPATIENT
Start: 2017-06-19 | End: 2020-10-13

## 2017-06-19 RX ADMIN — HYDROCODONE BITARTRATE AND ACETAMINOPHEN 1 TABLET: 7.5; 325 TABLET ORAL at 12:59

## 2017-06-19 RX ADMIN — ENOXAPARIN SODIUM 40 MG: 40 INJECTION SUBCUTANEOUS at 09:15

## 2017-06-19 RX ADMIN — HYDROCODONE BITARTRATE AND ACETAMINOPHEN 1 TABLET: 7.5; 325 TABLET ORAL at 06:04

## 2017-06-19 NOTE — PLAN OF CARE
Problem: Patient Care Overview (Adult)  Goal: Plan of Care Review  Outcome: Ongoing (interventions implemented as appropriate)    06/19/17 0439   Coping/Psychosocial Response Interventions   Plan Of Care Reviewed With patient   Patient Care Overview   Progress improving   Outcome Evaluation   Outcome Summary/Follow up Plan pain under control with pain meds. vitals stable. ambulates with assist x1. voiding without difficulty. . dressing dry and intact. vascular checks wnl.       Goal: Adult Individualization and Mutuality  Outcome: Ongoing (interventions implemented as appropriate)  Goal: Discharge Needs Assessment  Outcome: Ongoing (interventions implemented as appropriate)    Problem: Fall Risk (Adult)  Goal: Absence of Falls  Outcome: Ongoing (interventions implemented as appropriate)    Problem: Fractured Hip (Adult)  Goal: Signs and Symptoms of Listed Potential Problems Will be Absent or Manageable (Fractured Hip)  Outcome: Ongoing (interventions implemented as appropriate)    Problem: Pain, Acute (Adult)  Goal: Acceptable Pain Control/Comfort Level  Outcome: Ongoing (interventions implemented as appropriate)

## 2017-06-19 NOTE — DISCHARGE SUMMARY
"       Date of Admission: 6/16/2017  Date of Discharge:  6/19/2017    PCP: Edgard Recinos MD      DISCHARGE DIAGNOSIS  Active Problems:    Closed left hip fracture      SECONDARY DIAGNOSES  History reviewed. No pertinent past medical history.    CONSULTS   Consults     Date and Time Order Name Status Description    6/17/2017 0403 Inpatient Consult to Orthopedic Surgery Completed     6/17/2017 0200 Ortho (on-call MD unless specified) Completed     6/17/2017 0048 LHA (on-call MD unless specified) Completed           PROCEDURES PERFORMED  L Hip Xray:  Intertrochanteric fracture of the left proximal femur    HOSPITAL COURSE  Patient is a 81 y.o. female presented to [facility] complaining of left hip pain after a mechanical fall.  Please see the admitting history and physical for further details. She was found to have a left intertrochanteric hip fracture. She was taken to the OR on 6/17 for intramedullary nailing. She tolerated the procedure well and was mobilizing the next day. Pain was well controlled. Her Hb dropped slightly which was expected post-operatively. She will be sent out with lovenox for DVT ppx. She will go home with home health.      CONDITION ON DISCHARGE  Stable.      VITAL SIGNS  /68 (BP Location: Left arm, Patient Position: Lying)  Pulse 72  Temp 97 °F (36.1 °C) (Oral)   Resp 16  Ht 67\" (170.2 cm)  Wt 162 lb (73.5 kg)  SpO2 96%  BMI 25.37 kg/m2  Objective:  General Appearance:  Comfortable and well-appearing.    Vital signs: (most recent): Blood pressure 106/68, pulse 72, temperature 97 °F (36.1 °C), temperature source Oral, resp. rate 16, height 67\" (170.2 cm), weight 162 lb (73.5 kg), SpO2 96 %.  Vital signs are normal.    HEENT: Normal HEENT exam.    Lungs:  Normal effort.  Breath sounds clear to auscultation.    Heart: Normal rate.  Regular rhythm.    Abdomen: Abdomen is soft and non-distended.  Bowel sounds are normal.   There is no abdominal tenderness.   "   Extremities: There is no deformity or dependent edema.    Neurological: Patient is alert and oriented to person, place and time.    Skin:  Warm and dry.  No rash.               DISCHARGE DISPOSITION   Home or Self Care      DISCHARGE MEDICATIONS   Carla Campos KAPIL   Home Medication Instructions MARK:[id number]    Printed on:06/19/17 0721   Medication Information                      docusate sodium 100 MG capsule  Take 100 mg by mouth 2 (Two) Times a Day As Needed for Constipation.             enoxaparin (LOVENOX) 40 MG/0.4ML solution syringe  Inject 0.4 mL under the skin Daily for 13 days.             traMADol (ULTRAM) 50 MG tablet  Take 0.5 tablets by mouth Every 4 (Four) Hours As Needed for Moderate Pain (4-6) for up to 10 days.                No future appointments.  Additional Instructions for the Follow-ups that You Need to Schedule     Ambulatory Referral to Home Health    As directed    Face to Face Visit Date:  6/18/2017   Follow-up Provider for Plan of Care?:  I will be treating the patient on an ongoing basis.  Please send me the Plan of Care for signature.   Follow-up Provider:  GRAEME STRICKLAND   Reason/Clinical Findings:  IT fx left   Describe mobility limitations that make leaving home difficult:  Pain and immobility   Nursing/Therapeutic Services Requested:   Skilled Nursing  Physical Therapy  Occupational Therapy      Skilled nursing orders:  Wound care dressing/changes Comment - DC staples left hip on June 28, 2017 and apply steristrips   PT orders:  Gait Training Comment - transfers and short distance gait only less than 25 feet, avoid stairs   Weight Bearing Status:  As Tolerated   Occupational orders:  Activities of daily living   Frequency:  1 Week 1             Follow-up Information     Follow up with [facility] .    Specialty:  Home Health Services    Contact information:    [address]  [facility] [address] [phone]         Follow up with Zabrina Andrews MD. Schedule an appointment as soon as possible for a visit on 7/12/2017.    Specialty:  Orthopedic Surgery    Contact information:    [address]  [address]  [place] [address]  [phone]            TEST  RESULTS PENDING AT DISCHARGE         Dhruv Montalvo MD  [facility]  06/19/17  7:21 AM      Time: greater than 30 minutes.      Dragon disclaimer:  Much of this encounter note is an electronic transcription/translation of spoken language to printed text. The electronic translation of spoken language may permit erroneous, or at times, nonsensical words or phrases to be inadvertently transcribed; Although I have reviewed the note for such errors, some may still exist.

## 2017-06-19 NOTE — PLAN OF CARE
Problem: Patient Care Overview (Adult)  Goal: Plan of Care Review    06/19/17 0940   Coping/Psychosocial Response Interventions   Plan Of Care Reviewed With patient   Outcome Evaluation   Outcome Summary/Follow up Plan Pt performed exercises, ambulating in echavar[place] using walker, and demonstrating climbing steps w/o safety concerns during inpatient stay. Equipment to be delivered today. No further inpatient PT needs, DC PT.          Problem: Inpatient Physical Therapy  Goal: Bed Mobility Goal LTG- PT  Outcome: Outcome(s) achieved Date Met:  06/19/17 06/19/17 0940   Bed Mobility PT LTG   Bed Mobility PT LTG, Date Goal Reviewed 06/19/17   Bed Mobility PT LTG, Outcome goal partially met       Goal: Transfer Training Goal 1 LTG- PT  Outcome: Outcome(s) achieved Date Met:  06/19/17 06/19/17 0940   Transfer Training PT LTG   Transfer Training PT LTG, Date Goal Reviewed 06/19/17   Transfer Training PT LTG, Outcome goal partially met       Goal: Gait Training Goal LTG- PT  Outcome: Outcome(s) achieved Date Met:  06/19/17 06/19/17 0940   Gait Training PT LTG   Gait Training Goal PT LTG, Date Goal Reviewed 06/19/17   Gait Training Goal PT LTG, Outcome goal partially met       Goal: Stair Training Goal LTG- PT  Outcome: Outcome(s) achieved Date Met:  06/19/17 06/19/17 0940   Stair Training PT LTG   Stair Training Goal PT LTG, Date Goal Reviewed 06/19/17   Stair Training Goal PT LTG, Outcome goal partially met       Goal: Patient Education Goal LTG- PT  Outcome: Outcome(s) achieved Date Met:  06/19/17 06/19/17 0940   Patient Education PT LTG   Patient Education PT LTG, Date Goal Reviewed 06/19/17   Patient Education PT LTG Outcome goal partially met

## 2017-06-19 NOTE — THERAPY DISCHARGE NOTE
Acute Care - Physical Therapy Treatment Note/Discharge  [facility]     Patient Name: Carla Campos  : 1935  MRN: [id number]  Today's Date: 2017             Admit Date: 2017    Visit Dx:    ICD-10-CM ICD-9-CM   1. Closed left hip fracture, initial encounter S72.002A 820.8   2. Displaced intertrochanteric fracture of left femur, initial encounter for closed fracture S72.142A 820.21   3. Difficulty walking R26.2 719.7     Patient Active Problem List   Diagnosis   • Closed left hip fracture       Physical Therapy Education     Title: PT OT SLP Therapies (Resolved)     Topic: Physical Therapy (Resolved)     Point: Mobility training (Resolved)    Learning Progress Summary    Learner Readiness Method Response Comment Documented by Status   Patient Acceptance E VU  AR 17 0942 Done    Acceptance E VU,NR   17 0954 Done               Point: Home exercise program (Resolved)    Learning Progress Summary    Learner Readiness Method Response Comment Documented by Status   Patient Acceptance E VU  AR 17 0942 Done    Acceptance E VU,NR   17 0954 Done               Point: Precautions (Resolved)    Learning Progress Summary    Learner Readiness Method Response Comment Documented by Status   Patient Acceptance E VU  AR 17 0942 Done    Acceptance E VU,NR   17 0954 Done                      User Key     Initials Effective Dates Name Provider Type Discipline     05/18/15 -  Lindsay Rain, PT Physical Therapist PT    AR 16 -  Mariana Morris, PT Physical Therapist PT                    IP PT Goals       17 0940 17 0954       Bed Mobility PT LTG    Bed Mobility PT LTG, Date Established  (P)  17  -     Bed Mobility PT LTG, Time to Achieve  (P)  5 days  -     Bed Mobility PT LTG, Activity Type  (P)  all bed mobility  -     Bed Mobility PT LTG, [place] Level  (P)  independent  -     Bed Mobility PT LTG, Date Goal Reviewed 17   -AR      Bed Mobility PT LTG, Outcome goal partially met  -AR      Transfer Training PT LTG    Transfer Training PT LTG, Date Established  (P)  06/18/17  -     Transfer Training PT LTG, Activity Type  (P)  all transfers  -     Transfer Training PT LTG, [place] Level  (P)  supervision required  -     Transfer Training PT LTG, Assist Device  (P)  walker, rolling  -KH     Transfer Training PT  LTG, Date Goal Reviewed 06/19/17  -AR      Transfer Training PT LTG, Outcome goal partially met  -AR      Gait Training PT LTG    Gait Training Goal PT LTG, Date Established  (P)  06/18/17  -KH     Gait Training Goal PT LTG, Time to Achieve  (P)  5 days  -KH     Gait Training Goal PT LTG, [place] Level  (P)  supervision required  -     Gait Training Goal PT LTG, Assist Device  (P)  walker, rolling  -KH     Gait Training Goal PT LTG, Distance to Achieve  (P)  150 ft  -KH     Gait Training Goal PT LTG, Date Goal Reviewed 06/19/17  -AR      Gait Training Goal PT LTG, Outcome goal partially met  -AR      Stair Training PT LTG    Stair Training Goal PT LTG, Date Established  (P)  06/18/17  -     Stair Training Goal PT LTG, Time to Achieve  (P)  5 days  -KH     Stair Training Goal PT LTG, Number of Steps  (P)  4  -KH     Stair Training Goal PT LTG, [place] Level  (P)  contact guard assist  -     Stair Training Goal PT LTG, Date Goal Reviewed 06/19/17  -AR      Stair Training Goal PT LTG, Outcome goal partially met  -AR      Patient Education PT LTG    Patient Education PT LTG, Date Established  (P)  06/18/17  -     Patient Education PT LTG, Time to Achieve  (P)  5 days  -KH     Patient Education PT LTG, Education Type  (P)  HEP;precaution per surgeon  -     Patient Education PT LTG, Education Understanding  (P)  demonstrate adequately  -     Patient Education PT LTG, Date Goal Reviewed 06/19/17  -AR      Patient Education PT LTG Outcome goal partially met  -AR        User Key  (r) = Recorded By, (t) =  Taken By, (c) = Cosigned By    Initials Name Provider Type    CEE Rain, PT Physical Therapist    AR Mariana Morris PT Physical Therapist              Adult Rehabilitation Note       06/19/17 0939 06/18/17 1336       Rehab Assessment/Intervention    Discipline physical therapist  -AR physical therapy assistant  -     Document Type therapy note (daily note);discharge summary  -AR therapy note (daily note)  -JM     Subjective Information agree to therapy  -AR agree to therapy;complains of;weakness;fatigue;pain  -JM     Patient Effort, Rehab Treatment good  -AR      Precautions/Limitations fall precautions   WBAT  -AR fall precautions  -     Equipment Issued to Patient  front wheeled walker   ordered 6/18/17 [place], for Mon delivery  -JM     Recorded by [AR] Mariana Morris, PT [JM] Reta Martinez PTA     Pain Assessment    Pain Assessment No/denies pain  -AR 0-10  -JM     Pain Score  3  -JM     Post Pain Score  4  -JM     Pain Location  Hip  -     Pain Orientation  Left  -     Pain Intervention(s)  Medication (See MAR);Repositioned;Cold applied  -JM     Recorded by [AR] Mariana Morris, PT [JM] Reta Martinez PTA     Cognitive Assessment/Intervention    Current Cognitive/Communication Assessment functional  -AR      Orientation Status oriented x 4  -AR      Follows Commands/Answers Questions 100% of the time  -AR      Recorded by [AR] Mariana Morris PT      Bed Mobility, Assessment/Treatment    Bed Mob, Supine to Sit, [place] not tested  -AR contact guard assist;verbal cues required  -     Bed Mob, Sit to Supine, [place] not tested  -AR      Recorded by [AR] Mariana Morris, PT [JM] Reta Martinez, PTA     Transfer Assessment/Treatment    Transfers, Sit-Stand [place] contact guard assist  -AR contact guard assist;verbal cues required  -     Transfers, Stand-Sit [place] contact guard assist  -AR stand by assist;verbal cues required  -     Transfers,  Sit-Stand-Sit, Assist Device rolling walker  -AR rolling walker  -     Recorded by [AR] Mariana Morris, PT [JM] Reta Martinez PTA     Gait Assessment/Treatment    Gait, [place] Level contact guard assist  -AR contact guard assist;verbal cues required  -     Gait, Assistive Device rolling walker  -AR rolling walker  -JM     Gait, Distance (Feet) 150  -AR 40  -JM     Gait, Gait Deviations sheron decreased;decreased heel strike  -AR antalgic;sheron decreased;limb motion velocity decreased;step length decreased  -     Gait, Safety Issues step length decreased  -AR      Gait, Impairments  pain;strength decreased  -     Gait, Comment  difficulty advancing left le due to pn /wkness  -     Recorded by [AR] Mariana Morris, PT [JM] Reta Martinez PTA     Stairs Assessment/Treatment    Number of Stairs  4  -     Stairs, Handrail Location  both sides   will have one plus cane as another option at home  -     Stairs, [place] Level  contact guard assist;verbal cues required  -     Stairs, Technique Used  step to step (ascending);step to step (descending)  -     Stairs, Impairments  strength decreased;pain  -     Stairs, Comment declined to practice again  -AR slow paced but joanna  -     Recorded by [AR] Mariana Morris, PT [JM] Reta Martinez PTA     Therapy Exercises    Exercise Protocols hip ORIF  -AR hip ORIF  -     Hip ORIF Exercises 30 reps;completed protocol  -AR left:;15 reps;completed protocol;with assist;hip abduction  -     Recorded by [AR] Mariana Morris, PT [JM] Reta Martinez PTA     Positioning and Restraints    Pre-Treatment Position sitting in chair/recliner  -AR sitting in chair/recliner  -     Post Treatment Position chair  -AR      In Chair reclined;call light within reach;encouraged to call for assist;sitting  -AR reclined;call light within reach;encouraged to call for assist;with family/caregiver  -     Recorded by [AR] Mariana Morris, PT [JM] Reta  HORACIO Martinez       User Key  (r) = Recorded By, (t) = Taken By, (c) = Cosigned By    Initials Name Effective Dates    LORIE Martinez, HORACIO 02/18/16 -     AR Mariana Morris, PT 06/27/16 -           PT Recommendation and Plan  Anticipated Discharge Disposition: home with home health  Planned Therapy Interventions: balance training, bed mobility training, gait training, home exercise program, ROM (Range of Motion), transfer training, strengthening, stair training  PT Frequency: 2 times/day  Plan of Care Review  Plan Of Care Reviewed With: patient  Outcome Summary/Follow up Plan: Pt performed exercises, ambulating in [place] using walker, and demonstrating climbing steps w/o safety concerns during inpatient stay.  Equipment to be delivered today. No further inpatient PT needs, DC PT.            Outcome Measures       06/19/17 0900 06/18/17 1000       How much help from another person do you currently need...    Turning from your back to your side while in flat bed without using bedrails? 4  -AR 3  -KH     Moving from lying on back to sitting on the side of a flat bed without bedrails? 4  -AR 3  -KH     Moving to and from a bed to a chair (including a wheelchair)? 3  -AR 3  -KH     Standing up from a chair using your arms (e.g., wheelchair, bedside chair)? 3  -AR 3  -KH     Climbing 3-5 steps with a railing? 3  -AR 3  -KH     To walk in hospital room? 3  -AR 3  -KH     AM-PAC 6 Clicks Score 20  -AR 18  -KH     Functional Assessment    Outcome Measure Options  AM-PAC 6 Clicks Basic Mobility (PT)  -       User Key  (r) = Recorded By, (t) = Taken By, (c) = Cosigned By    Initials Name Provider Type    CEE Rain, PT Physical Therapist    AR Mariana Morris, PT Physical Therapist           Time Calculation:         PT Charges       06/19/17 0943          Time Calculation    Start Time 0830  -AR      Stop Time 0910  -AR      Time Calculation (min) 40 min  -AR      PT Received On 06/19/17  -AR      PT - Next  Appointment 06/14/17  -AR      PT Goal Re-Cert Due Date 06/14/17  -AR        User Key  (r) = Recorded By, (t) = Taken By, (c) = Cosigned By    Initials Name Provider Type    RAFAL Morris PT Physical Therapist          Therapy Charges for Today     Code Description Service Date Service Provider Modifiers Qty    42231203800 HC PT THER PROC GROUP 6/19/2017 Mariana Morris, PT GP 1    40569985641 HC PT THER SUPP EA 15 MIN 6/19/2017 Mariana Morris, PT GP 1    65699152817 HC PT THER PROC EA 15 MIN 6/19/2017 Mariana Morris PT GP 1          PT G-Codes  Outcome Measure Options: AM-PAC 6 Clicks Basic Mobility (PT)    PT Discharge Summary  Anticipated Discharge Disposition: home with home health  Reason for Discharge: Discharge from facility, Maximum functional potential achieved  Outcomes Achieved: Patient able to partially acheive established goals  Discharge Destination: Home with assist, Home with home health    Mariana Morris PT  6/19/2017

## 2020-10-13 ENCOUNTER — ANESTHESIA EVENT (OUTPATIENT)
Dept: PERIOP | Facility: HOSPITAL | Age: 85
End: 2020-10-13

## 2020-10-13 ENCOUNTER — HOSPITAL ENCOUNTER (INPATIENT)
Facility: HOSPITAL | Age: 85
LOS: 2 days | Discharge: HOME HEALTH | End: 2020-10-15
Attending: EMERGENCY MEDICINE | Admitting: HOSPITALIST

## 2020-10-13 ENCOUNTER — ANESTHESIA (OUTPATIENT)
Dept: PERIOP | Facility: HOSPITAL | Age: 85
End: 2020-10-13

## 2020-10-13 ENCOUNTER — APPOINTMENT (OUTPATIENT)
Dept: GENERAL RADIOLOGY | Facility: HOSPITAL | Age: 85
End: 2020-10-13

## 2020-10-13 DIAGNOSIS — S40.011A CONTUSION OF RIGHT SHOULDER, INITIAL ENCOUNTER: ICD-10-CM

## 2020-10-13 DIAGNOSIS — S72.141A CLOSED INTERTROCHANTERIC FRACTURE OF HIP, RIGHT, INITIAL ENCOUNTER (HCC): Primary | ICD-10-CM
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
## 2020-10-13 PROBLEM — R73.9 HYPERGLYCEMIA: Status: ACTIVE | Noted: 2020-10-13

## 2020-10-13 PROBLEM — K40.90 INGUINAL HERNIA: Status: ACTIVE | Noted: 2020-10-13

## 2020-10-13 PROBLEM — F10.90 CHRONIC ALCOHOL USE: Status: ACTIVE | Noted: 2020-10-13

## 2020-10-13 LAB
ABO GROUP BLD: NORMAL
ALBUMIN SERPL-MCNC: 4 G/DL (ref 3.5–5.2)
ALBUMIN/GLOB SERPL: 1.3 G/DL
ALP SERPL-CCNC: 67 U/L (ref 39–117)
ALT SERPL W P-5'-P-CCNC: 16 U/L (ref 1–33)
ANION GAP SERPL CALCULATED.3IONS-SCNC: 10 MMOL/L (ref 5–15)
AST SERPL-CCNC: 20 U/L (ref 1–32)
B PARAPERT DNA SPEC QL NAA+PROBE: NOT DETECTED
B PERT DNA SPEC QL NAA+PROBE: NOT DETECTED
BASOPHILS # BLD AUTO: 0.1 10*3/MM3 (ref 0–0.2)
BASOPHILS NFR BLD AUTO: 1.5 % (ref 0–1.5)
BILIRUB SERPL-MCNC: 0.3 MG/DL (ref 0–1.2)
BLD GP AB SCN SERPL QL: NEGATIVE
BUN SERPL-MCNC: 22 MG/DL (ref 8–23)
BUN/CREAT SERPL: 29.3 (ref 7–25)
C PNEUM DNA NPH QL NAA+NON-PROBE: NOT DETECTED
CALCIUM SPEC-SCNC: 9.3 MG/DL (ref 8.6–10.5)
CHLORIDE SERPL-SCNC: 105 MMOL/L (ref 98–107)
CO2 SERPL-SCNC: 23 MMOL/L (ref 22–29)
CREAT SERPL-MCNC: 0.75 MG/DL (ref 0.57–1)
DEPRECATED RDW RBC AUTO: 45.8 FL (ref 37–54)
EOSINOPHIL # BLD AUTO: 0.16 10*3/MM3 (ref 0–0.4)
EOSINOPHIL NFR BLD AUTO: 2.3 % (ref 0.3–6.2)
ERYTHROCYTE [DISTWIDTH] IN BLOOD BY AUTOMATED COUNT: 12.6 % (ref 12.3–15.4)
FLUAV H1 2009 PAND RNA NPH QL NAA+PROBE: NOT DETECTED
FLUAV H1 HA GENE NPH QL NAA+PROBE: NOT DETECTED
FLUAV H3 RNA NPH QL NAA+PROBE: NOT DETECTED
FLUAV SUBTYP SPEC NAA+PROBE: NOT DETECTED
FLUBV RNA ISLT QL NAA+PROBE: NOT DETECTED
GFR SERPL CREATININE-BSD FRML MDRD: 74 ML/MIN/1.73
GLOBULIN UR ELPH-MCNC: 3.2 GM/DL
GLUCOSE SERPL-MCNC: 108 MG/DL (ref 65–99)
HADV DNA SPEC NAA+PROBE: NOT DETECTED
HCOV 229E RNA SPEC QL NAA+PROBE: NOT DETECTED
HCOV HKU1 RNA SPEC QL NAA+PROBE: NOT DETECTED
HCOV NL63 RNA SPEC QL NAA+PROBE: NOT DETECTED
HCOV OC43 RNA SPEC QL NAA+PROBE: NOT DETECTED
HCT VFR BLD AUTO: 38.9 % (ref 34–46.6)
HGB BLD-MCNC: 12.9 G/DL (ref 12–15.9)
HMPV RNA NPH QL NAA+NON-PROBE: NOT DETECTED
HOLD SPECIMEN: NORMAL
HPIV1 RNA SPEC QL NAA+PROBE: NOT DETECTED
HPIV2 RNA SPEC QL NAA+PROBE: NOT DETECTED
HPIV3 RNA NPH QL NAA+PROBE: NOT DETECTED
HPIV4 P GENE NPH QL NAA+PROBE: NOT DETECTED
IMM GRANULOCYTES # BLD AUTO: 0.02 10*3/MM3 (ref 0–0.05)
IMM GRANULOCYTES NFR BLD AUTO: 0.3 % (ref 0–0.5)
LYMPHOCYTES # BLD AUTO: 1.2 10*3/MM3 (ref 0.7–3.1)
LYMPHOCYTES NFR BLD AUTO: 17.5 % (ref 19.6–45.3)
M PNEUMO IGG SER IA-ACNC: NOT DETECTED
MCH RBC QN AUTO: 32.7 PG (ref 26.6–33)
MCHC RBC AUTO-ENTMCNC: 33.2 G/DL (ref 31.5–35.7)
MCV RBC AUTO: 98.5 FL (ref 79–97)
MONOCYTES # BLD AUTO: 0.45 10*3/MM3 (ref 0.1–0.9)
MONOCYTES NFR BLD AUTO: 6.6 % (ref 5–12)
NEUTROPHILS NFR BLD AUTO: 4.91 10*3/MM3 (ref 1.7–7)
NEUTROPHILS NFR BLD AUTO: 71.8 % (ref 42.7–76)
NRBC BLD AUTO-RTO: 0 /100 WBC (ref 0–0.2)
PLATELET # BLD AUTO: 272 10*3/MM3 (ref 140–450)
PMV BLD AUTO: 9.4 FL (ref 6–12)
POTASSIUM SERPL-SCNC: 4.1 MMOL/L (ref 3.5–5.2)
PROT SERPL-MCNC: 7.2 G/DL (ref 6–8.5)
RBC # BLD AUTO: 3.95 10*6/MM3 (ref 3.77–5.28)
RH BLD: POSITIVE
RHINOVIRUS RNA SPEC NAA+PROBE: NOT DETECTED
RSV RNA NPH QL NAA+NON-PROBE: NOT DETECTED
SARS-COV-2 RNA NPH QL NAA+NON-PROBE: NOT DETECTED
SODIUM SERPL-SCNC: 138 MMOL/L (ref 136–145)
T&S EXPIRATION DATE: NORMAL
WBC # BLD AUTO: 6.84 10*3/MM3 (ref 3.4–10.8)
WHOLE BLOOD HOLD SPECIMEN: NORMAL
WHOLE BLOOD HOLD SPECIMEN: NORMAL

## 2020-10-13 PROCEDURE — 85025 COMPLETE CBC W/AUTO DIFF WBC: CPT | Performed by: EMERGENCY MEDICINE

## 2020-10-13 PROCEDURE — 25010000002 DEXAMETHASONE PER 1 MG: Performed by: NURSE ANESTHETIST, CERTIFIED REGISTERED

## 2020-10-13 PROCEDURE — 73502 X-RAY EXAM HIP UNI 2-3 VIEWS: CPT

## 2020-10-13 PROCEDURE — 76000 FLUOROSCOPY <1 HR PHYS/QHP: CPT

## 2020-10-13 PROCEDURE — 71045 X-RAY EXAM CHEST 1 VIEW: CPT

## 2020-10-13 PROCEDURE — 25010000002 FENTANYL CITRATE (PF) 100 MCG/2ML SOLUTION: Performed by: NURSE ANESTHETIST, CERTIFIED REGISTERED

## 2020-10-13 PROCEDURE — 86900 BLOOD TYPING SEROLOGIC ABO: CPT | Performed by: EMERGENCY MEDICINE

## 2020-10-13 PROCEDURE — C1713 ANCHOR/SCREW BN/BN,TIS/BN: HCPCS | Performed by: ORTHOPAEDIC SURGERY

## 2020-10-13 PROCEDURE — 25010000002 NEOSTIGMINE PER 0.5 MG: Performed by: ANESTHESIOLOGY

## 2020-10-13 PROCEDURE — 25010000002 ONDANSETRON PER 1 MG: Performed by: EMERGENCY MEDICINE

## 2020-10-13 PROCEDURE — 99284 EMERGENCY DEPT VISIT MOD MDM: CPT

## 2020-10-13 PROCEDURE — 0QS636Z REPOSITION RIGHT UPPER FEMUR WITH INTRAMEDULLARY INTERNAL FIXATION DEVICE, PERCUTANEOUS APPROACH: ICD-10-PCS | Performed by: ORTHOPAEDIC SURGERY

## 2020-10-13 PROCEDURE — 25010000002 PROPOFOL 10 MG/ML EMULSION: Performed by: NURSE ANESTHETIST, CERTIFIED REGISTERED

## 2020-10-13 PROCEDURE — 80053 COMPREHEN METABOLIC PANEL: CPT | Performed by: EMERGENCY MEDICINE

## 2020-10-13 PROCEDURE — 93010 ELECTROCARDIOGRAM REPORT: CPT | Performed by: INTERNAL MEDICINE

## 2020-10-13 PROCEDURE — 25010000002 THIAMINE PER 100 MG: Performed by: ORTHOPAEDIC SURGERY

## 2020-10-13 PROCEDURE — 86901 BLOOD TYPING SEROLOGIC RH(D): CPT | Performed by: EMERGENCY MEDICINE

## 2020-10-13 PROCEDURE — 73501 X-RAY EXAM HIP UNI 1 VIEW: CPT

## 2020-10-13 PROCEDURE — 86850 RBC ANTIBODY SCREEN: CPT | Performed by: EMERGENCY MEDICINE

## 2020-10-13 PROCEDURE — 0202U NFCT DS 22 TRGT SARS-COV-2: CPT | Performed by: EMERGENCY MEDICINE

## 2020-10-13 PROCEDURE — 25010000002 MORPHINE PER 10 MG: Performed by: EMERGENCY MEDICINE

## 2020-10-13 PROCEDURE — 25010000002 FENTANYL CITRATE (PF) 100 MCG/2ML SOLUTION: Performed by: ANESTHESIOLOGY

## 2020-10-13 PROCEDURE — 25010000003 CEFAZOLIN IN DEXTROSE 2-4 GM/100ML-% SOLUTION: Performed by: ORTHOPAEDIC SURGERY

## 2020-10-13 PROCEDURE — 93005 ELECTROCARDIOGRAM TRACING: CPT | Performed by: EMERGENCY MEDICINE

## 2020-10-13 PROCEDURE — 73030 X-RAY EXAM OF SHOULDER: CPT

## 2020-10-13 PROCEDURE — 25010000002 ONDANSETRON PER 1 MG: Performed by: ANESTHESIOLOGY

## 2020-10-13 DEVICE — TROCHANTERIC NAIL KIT, TI
Type: IMPLANTABLE DEVICE | Site: FEMUR | Status: FUNCTIONAL
Brand: GAMMA

## 2020-10-13 DEVICE — K-WIRE: Type: IMPLANTABLE DEVICE | Site: FEMUR | Status: FUNCTIONAL

## 2020-10-13 DEVICE — LOCKING SCREW, FULLY THREADED: Type: IMPLANTABLE DEVICE | Site: FEMUR | Status: FUNCTIONAL

## 2020-10-13 DEVICE — LAG SCREW, TI
Type: IMPLANTABLE DEVICE | Site: FEMUR | Status: FUNCTIONAL
Brand: GAMMA

## 2020-10-13 RX ORDER — BISACODYL 5 MG/1
10 TABLET, DELAYED RELEASE ORAL DAILY PRN
Status: DISCONTINUED | OUTPATIENT
Start: 2020-10-13 | End: 2020-10-15 | Stop reason: HOSPADM

## 2020-10-13 RX ORDER — ACETAMINOPHEN 325 MG/1
650 TABLET ORAL EVERY 4 HOURS PRN
Status: DISCONTINUED | OUTPATIENT
Start: 2020-10-13 | End: 2020-10-15 | Stop reason: HOSPADM

## 2020-10-13 RX ORDER — OXYCODONE AND ACETAMINOPHEN 7.5; 325 MG/1; MG/1
1 TABLET ORAL ONCE AS NEEDED
Status: DISCONTINUED | OUTPATIENT
Start: 2020-10-13 | End: 2020-10-13 | Stop reason: HOSPADM

## 2020-10-13 RX ORDER — SODIUM CHLORIDE 0.9 % (FLUSH) 0.9 %
10 SYRINGE (ML) INJECTION EVERY 12 HOURS SCHEDULED
Status: DISCONTINUED | OUTPATIENT
Start: 2020-10-13 | End: 2020-10-15 | Stop reason: HOSPADM

## 2020-10-13 RX ORDER — DOCUSATE SODIUM 100 MG/1
100 CAPSULE, LIQUID FILLED ORAL 2 TIMES DAILY PRN
Status: DISCONTINUED | OUTPATIENT
Start: 2020-10-13 | End: 2020-10-15 | Stop reason: HOSPADM

## 2020-10-13 RX ORDER — HYDROCODONE BITARTRATE AND ACETAMINOPHEN 7.5; 325 MG/1; MG/1
1 TABLET ORAL ONCE AS NEEDED
Status: DISCONTINUED | OUTPATIENT
Start: 2020-10-13 | End: 2020-10-13 | Stop reason: HOSPADM

## 2020-10-13 RX ORDER — NALOXONE HCL 0.4 MG/ML
0.2 VIAL (ML) INJECTION AS NEEDED
Status: DISCONTINUED | OUTPATIENT
Start: 2020-10-13 | End: 2020-10-13 | Stop reason: HOSPADM

## 2020-10-13 RX ORDER — LIDOCAINE HYDROCHLORIDE 10 MG/ML
0.5 INJECTION, SOLUTION EPIDURAL; INFILTRATION; INTRACAUDAL; PERINEURAL ONCE AS NEEDED
Status: DISCONTINUED | OUTPATIENT
Start: 2020-10-13 | End: 2020-10-13 | Stop reason: HOSPADM

## 2020-10-13 RX ORDER — CEFAZOLIN SODIUM 2 G/100ML
2 INJECTION, SOLUTION INTRAVENOUS ONCE
Status: COMPLETED | OUTPATIENT
Start: 2020-10-13 | End: 2020-10-13

## 2020-10-13 RX ORDER — ONDANSETRON 2 MG/ML
4 INJECTION INTRAMUSCULAR; INTRAVENOUS ONCE
Status: COMPLETED | OUTPATIENT
Start: 2020-10-13 | End: 2020-10-13

## 2020-10-13 RX ORDER — LORAZEPAM 1 MG/1
1 TABLET ORAL
Status: DISCONTINUED | OUTPATIENT
Start: 2020-10-13 | End: 2020-10-15 | Stop reason: HOSPADM

## 2020-10-13 RX ORDER — LIDOCAINE HYDROCHLORIDE 20 MG/ML
INJECTION, SOLUTION INFILTRATION; PERINEURAL AS NEEDED
Status: DISCONTINUED | OUTPATIENT
Start: 2020-10-13 | End: 2020-10-13 | Stop reason: SURG

## 2020-10-13 RX ORDER — MORPHINE SULFATE 2 MG/ML
4 INJECTION, SOLUTION INTRAMUSCULAR; INTRAVENOUS ONCE
Status: COMPLETED | OUTPATIENT
Start: 2020-10-13 | End: 2020-10-13

## 2020-10-13 RX ORDER — DEXAMETHASONE SODIUM PHOSPHATE 10 MG/ML
INJECTION INTRAMUSCULAR; INTRAVENOUS AS NEEDED
Status: DISCONTINUED | OUTPATIENT
Start: 2020-10-13 | End: 2020-10-13 | Stop reason: SURG

## 2020-10-13 RX ORDER — LORAZEPAM 2 MG/ML
0.5 INJECTION INTRAMUSCULAR
Status: DISCONTINUED | OUTPATIENT
Start: 2020-10-13 | End: 2020-10-15 | Stop reason: HOSPADM

## 2020-10-13 RX ORDER — FENTANYL CITRATE 50 UG/ML
50 INJECTION, SOLUTION INTRAMUSCULAR; INTRAVENOUS
Status: DISCONTINUED | OUTPATIENT
Start: 2020-10-13 | End: 2020-10-13 | Stop reason: HOSPADM

## 2020-10-13 RX ORDER — PROPOFOL 10 MG/ML
VIAL (ML) INTRAVENOUS AS NEEDED
Status: DISCONTINUED | OUTPATIENT
Start: 2020-10-13 | End: 2020-10-13 | Stop reason: SURG

## 2020-10-13 RX ORDER — ROCURONIUM BROMIDE 10 MG/ML
INJECTION, SOLUTION INTRAVENOUS AS NEEDED
Status: DISCONTINUED | OUTPATIENT
Start: 2020-10-13 | End: 2020-10-13 | Stop reason: SURG

## 2020-10-13 RX ORDER — THIAMINE MONONITRATE (VIT B1) 100 MG
100 TABLET ORAL DAILY
Status: DISCONTINUED | OUTPATIENT
Start: 2020-10-14 | End: 2020-10-15 | Stop reason: HOSPADM

## 2020-10-13 RX ORDER — LORAZEPAM 2 MG/ML
1 INJECTION INTRAMUSCULAR
Status: DISCONTINUED | OUTPATIENT
Start: 2020-10-13 | End: 2020-10-15 | Stop reason: HOSPADM

## 2020-10-13 RX ORDER — NALOXONE HCL 0.4 MG/ML
0.4 VIAL (ML) INJECTION
Status: DISCONTINUED | OUTPATIENT
Start: 2020-10-13 | End: 2020-10-15 | Stop reason: HOSPADM

## 2020-10-13 RX ORDER — GLYCOPYRROLATE 0.2 MG/ML
INJECTION INTRAMUSCULAR; INTRAVENOUS AS NEEDED
Status: DISCONTINUED | OUTPATIENT
Start: 2020-10-13 | End: 2020-10-13 | Stop reason: SURG

## 2020-10-13 RX ORDER — SODIUM CHLORIDE, SODIUM LACTATE, POTASSIUM CHLORIDE, CALCIUM CHLORIDE 600; 310; 30; 20 MG/100ML; MG/100ML; MG/100ML; MG/100ML
9 INJECTION, SOLUTION INTRAVENOUS CONTINUOUS
Status: DISCONTINUED | OUTPATIENT
Start: 2020-10-13 | End: 2020-10-13

## 2020-10-13 RX ORDER — CEFAZOLIN SODIUM 2 G/100ML
2 INJECTION, SOLUTION INTRAVENOUS EVERY 8 HOURS
Status: COMPLETED | OUTPATIENT
Start: 2020-10-14 | End: 2020-10-14

## 2020-10-13 RX ORDER — FOLIC ACID 1 MG/1
1 TABLET ORAL DAILY
Status: DISCONTINUED | OUTPATIENT
Start: 2020-10-14 | End: 2020-10-15 | Stop reason: HOSPADM

## 2020-10-13 RX ORDER — ONDANSETRON 2 MG/ML
INJECTION INTRAMUSCULAR; INTRAVENOUS AS NEEDED
Status: DISCONTINUED | OUTPATIENT
Start: 2020-10-13 | End: 2020-10-13 | Stop reason: SURG

## 2020-10-13 RX ORDER — FENTANYL CITRATE 50 UG/ML
INJECTION, SOLUTION INTRAMUSCULAR; INTRAVENOUS AS NEEDED
Status: DISCONTINUED | OUTPATIENT
Start: 2020-10-13 | End: 2020-10-13 | Stop reason: SURG

## 2020-10-13 RX ORDER — FAMOTIDINE 10 MG/ML
20 INJECTION, SOLUTION INTRAVENOUS ONCE
Status: COMPLETED | OUTPATIENT
Start: 2020-10-13 | End: 2020-10-13

## 2020-10-13 RX ORDER — LORAZEPAM 0.5 MG/1
0.5 TABLET ORAL
Status: DISCONTINUED | OUTPATIENT
Start: 2020-10-13 | End: 2020-10-15 | Stop reason: HOSPADM

## 2020-10-13 RX ORDER — HYDROCODONE BITARTRATE AND ACETAMINOPHEN 7.5; 325 MG/1; MG/1
1 TABLET ORAL EVERY 4 HOURS PRN
Status: DISCONTINUED | OUTPATIENT
Start: 2020-10-13 | End: 2020-10-15 | Stop reason: HOSPADM

## 2020-10-13 RX ORDER — MORPHINE SULFATE 2 MG/ML
2 INJECTION, SOLUTION INTRAMUSCULAR; INTRAVENOUS EVERY 4 HOURS PRN
Status: DISCONTINUED | OUTPATIENT
Start: 2020-10-13 | End: 2020-10-15 | Stop reason: HOSPADM

## 2020-10-13 RX ORDER — DOCUSATE SODIUM 100 MG/1
100 CAPSULE, LIQUID FILLED ORAL 2 TIMES DAILY
Status: DISCONTINUED | OUTPATIENT
Start: 2020-10-13 | End: 2020-10-15 | Stop reason: HOSPADM

## 2020-10-13 RX ORDER — ONDANSETRON 2 MG/ML
4 INJECTION INTRAMUSCULAR; INTRAVENOUS EVERY 6 HOURS PRN
Status: DISCONTINUED | OUTPATIENT
Start: 2020-10-13 | End: 2020-10-15 | Stop reason: HOSPADM

## 2020-10-13 RX ORDER — LABETALOL HYDROCHLORIDE 5 MG/ML
5 INJECTION, SOLUTION INTRAVENOUS
Status: DISCONTINUED | OUTPATIENT
Start: 2020-10-13 | End: 2020-10-13 | Stop reason: HOSPADM

## 2020-10-13 RX ORDER — SODIUM CHLORIDE 0.9 % (FLUSH) 0.9 %
10 SYRINGE (ML) INJECTION AS NEEDED
Status: DISCONTINUED | OUTPATIENT
Start: 2020-10-13 | End: 2020-10-15 | Stop reason: HOSPADM

## 2020-10-13 RX ORDER — DIPHENHYDRAMINE HYDROCHLORIDE 50 MG/ML
12.5 INJECTION INTRAMUSCULAR; INTRAVENOUS
Status: DISCONTINUED | OUTPATIENT
Start: 2020-10-13 | End: 2020-10-13 | Stop reason: HOSPADM

## 2020-10-13 RX ORDER — PROMETHAZINE HYDROCHLORIDE 25 MG/1
25 SUPPOSITORY RECTAL ONCE AS NEEDED
Status: DISCONTINUED | OUTPATIENT
Start: 2020-10-13 | End: 2020-10-13 | Stop reason: HOSPADM

## 2020-10-13 RX ORDER — EPHEDRINE SULFATE 50 MG/ML
INJECTION, SOLUTION INTRAVENOUS AS NEEDED
Status: DISCONTINUED | OUTPATIENT
Start: 2020-10-13 | End: 2020-10-13 | Stop reason: SURG

## 2020-10-13 RX ORDER — FLUMAZENIL 0.1 MG/ML
0.2 INJECTION INTRAVENOUS AS NEEDED
Status: DISCONTINUED | OUTPATIENT
Start: 2020-10-13 | End: 2020-10-13 | Stop reason: HOSPADM

## 2020-10-13 RX ORDER — THIAMINE MONONITRATE (VIT B1) 100 MG
100 TABLET ORAL ONCE
Status: COMPLETED | OUTPATIENT
Start: 2020-10-13 | End: 2020-10-13

## 2020-10-13 RX ORDER — HYDROMORPHONE HYDROCHLORIDE 1 MG/ML
0.5 INJECTION, SOLUTION INTRAMUSCULAR; INTRAVENOUS; SUBCUTANEOUS
Status: DISCONTINUED | OUTPATIENT
Start: 2020-10-13 | End: 2020-10-13 | Stop reason: HOSPADM

## 2020-10-13 RX ORDER — UREA 10 %
3 LOTION (ML) TOPICAL NIGHTLY PRN
Status: DISCONTINUED | OUTPATIENT
Start: 2020-10-13 | End: 2020-10-15 | Stop reason: HOSPADM

## 2020-10-13 RX ORDER — SODIUM CHLORIDE 0.9 % (FLUSH) 0.9 %
3-10 SYRINGE (ML) INJECTION AS NEEDED
Status: DISCONTINUED | OUTPATIENT
Start: 2020-10-13 | End: 2020-10-13 | Stop reason: HOSPADM

## 2020-10-13 RX ORDER — SODIUM CHLORIDE 0.9 % (FLUSH) 0.9 %
3 SYRINGE (ML) INJECTION EVERY 12 HOURS SCHEDULED
Status: DISCONTINUED | OUTPATIENT
Start: 2020-10-13 | End: 2020-10-13 | Stop reason: HOSPADM

## 2020-10-13 RX ORDER — DIPHENHYDRAMINE HCL 25 MG
25 CAPSULE ORAL
Status: DISCONTINUED | OUTPATIENT
Start: 2020-10-13 | End: 2020-10-13 | Stop reason: HOSPADM

## 2020-10-13 RX ORDER — PROMETHAZINE HYDROCHLORIDE 25 MG/1
25 TABLET ORAL ONCE AS NEEDED
Status: DISCONTINUED | OUTPATIENT
Start: 2020-10-13 | End: 2020-10-13 | Stop reason: HOSPADM

## 2020-10-13 RX ORDER — EPHEDRINE SULFATE 50 MG/ML
5 INJECTION, SOLUTION INTRAVENOUS ONCE AS NEEDED
Status: DISCONTINUED | OUTPATIENT
Start: 2020-10-13 | End: 2020-10-13 | Stop reason: HOSPADM

## 2020-10-13 RX ORDER — ONDANSETRON 2 MG/ML
4 INJECTION INTRAMUSCULAR; INTRAVENOUS ONCE AS NEEDED
Status: DISCONTINUED | OUTPATIENT
Start: 2020-10-13 | End: 2020-10-13 | Stop reason: HOSPADM

## 2020-10-13 RX ORDER — MAGNESIUM HYDROXIDE 1200 MG/15ML
LIQUID ORAL AS NEEDED
Status: DISCONTINUED | OUTPATIENT
Start: 2020-10-13 | End: 2020-10-13 | Stop reason: HOSPADM

## 2020-10-13 RX ORDER — DIPHENOXYLATE HYDROCHLORIDE AND ATROPINE SULFATE 2.5; .025 MG/1; MG/1
1 TABLET ORAL DAILY
Status: DISCONTINUED | OUTPATIENT
Start: 2020-10-14 | End: 2020-10-15 | Stop reason: HOSPADM

## 2020-10-13 RX ADMIN — ONDANSETRON 4 MG: 2 INJECTION INTRAMUSCULAR; INTRAVENOUS at 10:33

## 2020-10-13 RX ADMIN — FENTANYL CITRATE 50 MCG: 0.05 INJECTION, SOLUTION INTRAMUSCULAR; INTRAVENOUS at 16:23

## 2020-10-13 RX ADMIN — FENTANYL CITRATE 50 MCG: 0.05 INJECTION, SOLUTION INTRAMUSCULAR; INTRAVENOUS at 16:51

## 2020-10-13 RX ADMIN — ONDANSETRON HYDROCHLORIDE 4 MG: 2 SOLUTION INTRAMUSCULAR; INTRAVENOUS at 17:39

## 2020-10-13 RX ADMIN — MORPHINE SULFATE 4 MG: 2 INJECTION, SOLUTION INTRAMUSCULAR; INTRAVENOUS at 10:33

## 2020-10-13 RX ADMIN — NEOSTIGMINE METHYLSULFATE 2.5 MG: 1 INJECTION INTRAMUSCULAR; INTRAVENOUS; SUBCUTANEOUS at 17:50

## 2020-10-13 RX ADMIN — FENTANYL CITRATE 50 MCG: 0.05 INJECTION, SOLUTION INTRAMUSCULAR; INTRAVENOUS at 18:24

## 2020-10-13 RX ADMIN — FENTANYL CITRATE 25 MCG: 50 INJECTION INTRAMUSCULAR; INTRAVENOUS at 17:01

## 2020-10-13 RX ADMIN — HYDROCODONE BITARTRATE AND ACETAMINOPHEN 1 TABLET: 7.5; 325 TABLET ORAL at 22:37

## 2020-10-13 RX ADMIN — FENTANYL CITRATE 50 MCG: 50 INJECTION INTRAMUSCULAR; INTRAVENOUS at 17:21

## 2020-10-13 RX ADMIN — THIAMINE HYDROCHLORIDE 100 MG: 100 INJECTION, SOLUTION INTRAMUSCULAR; INTRAVENOUS at 21:46

## 2020-10-13 RX ADMIN — GLYCOPYRROLATE 0.4 MG: 0.2 INJECTION INTRAMUSCULAR; INTRAVENOUS at 17:50

## 2020-10-13 RX ADMIN — ROCURONIUM BROMIDE 35 MG: 10 INJECTION INTRAVENOUS at 17:01

## 2020-10-13 RX ADMIN — LIDOCAINE HYDROCHLORIDE 60 MG: 20 INJECTION, SOLUTION INFILTRATION; PERINEURAL at 17:01

## 2020-10-13 RX ADMIN — SODIUM CHLORIDE, POTASSIUM CHLORIDE, SODIUM LACTATE AND CALCIUM CHLORIDE 9 ML/HR: 600; 310; 30; 20 INJECTION, SOLUTION INTRAVENOUS at 16:24

## 2020-10-13 RX ADMIN — FENTANYL CITRATE 25 MCG: 50 INJECTION INTRAMUSCULAR; INTRAVENOUS at 17:02

## 2020-10-13 RX ADMIN — FAMOTIDINE 20 MG: 10 INJECTION, SOLUTION INTRAVENOUS at 16:23

## 2020-10-13 RX ADMIN — DEXAMETHASONE SODIUM PHOSPHATE 6 MG: 10 INJECTION INTRAMUSCULAR; INTRAVENOUS at 17:21

## 2020-10-13 RX ADMIN — CEFAZOLIN SODIUM 2 G: 2 INJECTION, SOLUTION INTRAVENOUS at 17:08

## 2020-10-13 RX ADMIN — PROPOFOL 140 MG: 10 INJECTION, EMULSION INTRAVENOUS at 17:01

## 2020-10-13 RX ADMIN — EPHEDRINE SULFATE 10 MG: 50 INJECTION INTRAVENOUS at 17:05

## 2020-10-13 NOTE — PLAN OF CARE
See below.    Problem: Adult Inpatient Plan of Care  Goal: Plan of Care Review  Outcome: Ongoing, Progressing  Flowsheets (Taken 10/13/2020 1801)  Progress: improving  Plan of Care Reviewed With: patient  Outcome Summary: 85/F presenting with right femur fracture.  ALOx4, RA, lungs clear but diminished, BS hypoactive but improving, voiding independently.  Currently in surgery, will reassess patient postop

## 2020-10-13 NOTE — ANESTHESIA POSTPROCEDURE EVALUATION
"Patient: Carla Campos    Procedure Summary     Date: 10/13/20 Room / Location: [facility] OR [address] [place] [address] MAIN OR    Anesthesia Start: 1658 Anesthesia Stop: 1806    Procedure: RT. GAMMA NAIL (Right Thigh) Diagnosis:       Closed intertrochanteric fracture of hip, right, initial encounter (CMS/[facility])      (Closed intertrochanteric fracture of hip, right, initial encounter (CMS/[facility]) [S72.141A])    Surgeon: Zabrina Andrews MD Provider: Jose Gage MD    Anesthesia Type: general ASA Status: 2          Anesthesia Type: general    Vitals  Vitals Value Taken Time   /74 10/13/20 1845   Temp 36.6 °C (97.9 °F) 10/13/20 1845   Pulse 67 10/13/20 1856   Resp 16 10/13/20 1845   SpO2 100 % 10/13/20 1856   Vitals shown include unvalidated device data.        Post Anesthesia Care and Evaluation    Patient location during evaluation: bedside  Patient participation: complete - patient participated  Level of consciousness: awake and alert  Pain management: adequate  Airway patency: patent  Anesthetic complications: No anesthetic complications  PONV Status: NA  Cardiovascular status: acceptable and hemodynamically stable  Respiratory status: acceptable  Hydration status: acceptable    Comments: /74   Pulse 63   Temp 36.6 °C (97.9 °F)   Resp 16   Ht 167.6 cm (66\")   Wt 70.3 kg (155 lb)   SpO2 96%   BMI 25.02 kg/m²         "

## 2020-10-13 NOTE — OP NOTE
ORTHOPAEDIC OPERATIVE NOTE    Patient: Carla Campos   YOB: 1935    Medical Record Number: [id number]    Attending Physician: Blaise Everett MD    Primary Care Physician: Provider, No Known    DATE OF PROCEDURE: 10/13/2020    Surgeon: Zabrina Andrews MD        Pre-op Diagnosis:   Closed intertrochanteric fracture of hip, right, initial encounter (CMS/[facility]) [S72.141A]    Post-Op Diagnosis Codes:     * Closed intertrochanteric fracture of hip, right, initial encounter (CMS/[facility]) [S72.141A]    PROCEDURE PERFORMED:  Procedure(s):  RT. GAMMA NAIL utilizing a [employer]    gamma nail measuring  11×180 mm, 125° neck angle, lag screw  95 mm.       Implant Name Type Inv. Item Serial No.  Lot No. LRB No. Used Action   NAIL FEM FLOWER/180KT TI 125D [id number] STRL - [id number] Implant NAIL FEM FLOWER/180KT TI 125D [id number] STRL  GISELLA YI [id number] Right 1 Implanted   KWIRE 3.6Q595FI NS STRL - [id number] Implant KWIRE [id number] NS STRL  GISELLA YI [id number] Right 1 Implanted   SCRW LAG GAM3 TI 10.5X95MM STRL - [id number] Implant SCRW LAG GAM3 TI 10.5X95MM STRL  GISELLA YI [id number] Right 1 Implanted   SCRW LK FUL/THRD 5X35MM STRL - [id number] Implant SCRW LK FUL/THRD 5X35MM STRL  GISELLA YI [id number] Right 1 Implanted       SURGEON: Zabrina Andrews MD     ASSISTANT: Ryan Deleon DO Fellow     ANESTHESIA: General  Anesthesiologist: Jose Gage MD; Kel Jennings MD  CRNA: Jer Torre CRNA    Estimated Blood Loss: 50 mL    Specimens: * No orders in the log *    COMPLICATIONS: Nil.     DRAINS: Nil  * No LDAs found *    INDICATIONS: The patient is a 85 y.o. female  who presented to  [facility] emergency room following a  Fall from standing height.  The patient sustained an injury to the hip. Evaluation of the x-rays confirm presence of a comminuted intertrochanteric fracture. Treatment options and alternatives were  discussed in detail with the  patient and   family  who is indicated for an internal fixation of the  Proximal femur fracture with intramedullary nailing. Likely risks and benefits of the procedure, including but not limited to  infection, DVT, pulmonary embolism, fat embolism, malunion, nonunion, leg length discrepancy, failure of fixation, periimplant fractures, medical risks including stroke, heart attack, have been discussed in detail. Despite the risks involved, the patient elected to proceed and informed consent was obtained and the patient was scheduled for surgery. The patient was seen in the preoperative holding area and the operative site was marked. The patient has been seen and cleared  by the admitting service to the operating room.    The patient is known to me from her left hip intertrochanteric fracture fixation.    Patient was also evaluated for a right shoulder injury negative for fractures has osteoarthritis of the right shoulder.    DESCRIPTION OF PROCEDURE:   The patient was transferred to [facility] operating room. Preoperative antibiotics in the form of Kefzol  intravenously was infused prior to the incision  according to the SCIP protocol. A surgical time out was done with the team and the correct patient, surgical side and site were identified.      After achieving adequate general anesthesia, the patient was transferred onto the [place] table.  All bony prominences were padded well.  Closed reduction of the hip fracture was done and the position was satisfactory under image intensifier control, both in AP and lateral views.  The  hip was prepped and draped in the usual sterile fashion.  A skin incision about 4 cm long was made proximal to the tip of the greater trochanter.    Skin and subcutaneous  tissue and deep fascia was incised in line with the skin incision.  A threaded guidewire was utilized to enter the proximal femur and the the guidewire was found to be satisfactorily  seated in the medullary canal. An entry reamer was utilized.   There was significant osteopenia.      The trochanteric fracture itself was found to be comminuted.   A  cephalo medullary  nail  measuring 11× 180 mm was selected, assembled to its introducer on the back table.  The nail was seated over the guidewire.  It was found to be seated satisfactorily.  A second skin incision was made over the lateral aspect of the thigh and a threaded guidewire was passed from the lateral femoral cortex into the femoral neck and femoral head utilizing the locking zig.  This was found to be seated satisfactorily in both AP and lateral views.  Estimated lag screw length was measured, triple reamer was utilized and a lag screw 95 mm was seated into position over the guidewire.  A set screw was seated, making this an angle stable device.      A single interlocking screw was placed from lateral to medial direction utilizing locking zig, in the static hole using a separate small incision.    The position of the hardware, The reduction of the fracture, The fracture alignment and the stability of fixation was found to be satisfactory and stable.  Incisions were thoroughly irrigated with saline and closed in layers utilizing Vicryl  And skin Staples.  Sterile dressings were placed and the patient was transferred to the recovery room in a stable condition.  The patient tolerated the procedure well.  There were no complications.  The patient had adequate distal pulses and good capillary refill.      I plan to continue  antibiotics postoperatively  Kefzol  IV every 8 hours for the first 24 hours.  Also,  will be started on  Lovenox 40 mg subcutaneously daily starting on postoperative day #1.  We will mobilize the patient  with physical therapy.      I discussed the satisfactory performance of the procedure with the patient's family and discussed with them The postoperative management.       Zabrina Andrews,  M.D.    10/13/2020    CC: Provider, No Known; MD Marguerite Gaines Minh Loc, MD

## 2020-10-13 NOTE — ANESTHESIA PREPROCEDURE EVALUATION
Anesthesia Evaluation     Patient summary reviewed and Nursing notes reviewed   NPO Solid Status: > 8 hours  NPO Liquid Status: > 2 hours           Airway   Mallampati: II  TM distance: >3 FB  Neck ROM: full  Dental - normal exam     Pulmonary - normal exam   (+) a smoker Former,   Cardiovascular - normal exam    ECG reviewed        Neuro/Psych  GI/Hepatic/Renal/Endo      Musculoskeletal     Abdominal    Substance History      OB/GYN          Other                        Anesthesia Plan    ASA 2     general       Anesthetic plan, all risks, benefits, and alternatives have been provided, discussed and informed consent has been obtained with: patient.

## 2020-10-13 NOTE — ANESTHESIA PROCEDURE NOTES
Airway  Urgency: elective    Date/Time: 10/13/2020 5:02 PM  Airway not difficult    General Information and Staff    Patient location during procedure: OR  Anesthesiologist: Kel Jennings MD  CRNA: Jer Torre CRNA    Indications and Patient Condition  Indications for airway management: airway protection    Preoxygenated: yes  MILS maintained throughout  Mask difficulty assessment: 1 - vent by mask    Final Airway Details  Final airway type: endotracheal airway      Successful airway: ETT  Cuffed: yes   Successful intubation technique: direct laryngoscopy  Endotracheal tube insertion site: oral  Blade: Dexter  Blade size: 3  ETT size (mm): 7.0  Cormack-Lehane Classification: grade I - full view of glottis  Placement verified by: chest auscultation and capnometry   Measured from: lips  ETT/EBT  to lips (cm): 20  Number of attempts at approach: 1  Assessment: lips, teeth, and gum same as pre-op and atraumatic intubation

## 2020-10-13 NOTE — H&P
History and Physical    Patient Name: Carla Campos  Age/Sex: 85 y.o. female  : 1935  MRN: [id number]    Date of Admission: 10/13/2020  Date of Encounter Visit: 10/13/20  Encounter Provider: MARCELO Fuentes  Place of Service: [facility]  Patient Care Team:  Provider, No Known as PCP - General    Subjective:     Chief Complaint:   Chief Complaint   Patient presents with   • Hip Injury   • Shoulder Pain       History of Present Illness  Carla Campos is a 85 y.o. female with no significant medical history. Patient presented with complaints of R hip pain after sustaining a fall. Patient was unloading her horse of the trailer and the horse backed up and knocked the door out of her hands causing her to fall on her right side. She had instant sharp severe pain to right hip that worsened with movement and improved with rest. She does have some chronic right shoulder pain that was worsened by injury, but denies any other joint pain or head injury.      She had a prior hip injury in 2017 and had left hip fracture repair with nailing by Dr. Andrews. Denies any history of CAD, lung disease or prior issues with anesthesia. Denies any recent chest pain, palpitations, SOA, cough, congestion, N/V/D.  She was a former smoker quit 30 years ago.  She drinks 2 alcoholic beverages per day, but denies any history of withdrawal even when she did not drink for a few days.    Work up in the ER acute displaced right trochanteric femoral fracture, right shoulder osteoarthritis, right inguinal hernia with suspected bowel, cardiomegaly with some central venous congestion.  CBC and BMP were unremarkable.    Review of Systems   Constitutional: Negative for chills, fatigue and fever.   HENT: Negative for congestion and trouble swallowing.    Eyes: Negative for visual disturbance.   Respiratory: Negative for cough, shortness of breath and wheezing.    Cardiovascular: Negative for chest pain, palpitations and leg  swelling.   Gastrointestinal: Positive for diarrhea. Negative for abdominal pain and vomiting.   Genitourinary: Negative for difficulty urinating and dysuria.   Musculoskeletal: Positive for arthralgias (Rhip and R shoulder). Negative for back pain.   Neurological: Negative for dizziness, syncope, weakness and headaches.   Psychiatric/Behavioral: Negative for confusion. The patient is not nervous/anxious.    All other systems reviewed and are negative.    Past Medical and Surgical History:  History reviewed. No pertinent past medical history.    Past Surgical History:   Procedure Laterality Date   • BLADDER REPAIR     • COLONOSCOPY     • HYSTERECTOMY     • FL OPEN FIX INTER/SUBTROCH FX,IMPLNT Left 6/17/2017    Procedure: LEFT FEMUR INTRAMEDULLARY NAILING/RODDING;  Surgeon: Zabrina Andrews MD;  Location: [facility];  Service: Orthopedics   • TUBAL ABDOMINAL LIGATION Bilateral     age mid twenties       Home Medications:   No current facility-administered medications on file prior to encounter.      Current Outpatient Medications on File Prior to Encounter   Medication Sig Dispense Refill   • Lido-Menthol-Methyl Sal-Camph (CBD KINGS EX) Apply  topically.     • docusate sodium 100 MG capsule Take 100 mg by mouth 2 (Two) Times a Day As Needed for Constipation. 60 capsule 1       Inpatient Medications:  Scheduled Meds:  Continuous Infusions:No current facility-administered medications for this encounter.     PRN Meds:.    Allergies:  No Known Allergies    Past Social History:  Social History     Socioeconomic History   • Marital status:      Spouse name: Not on file   • Number of children: Not on file   • Years of education: Not on file   • Highest education level: Not on file   Tobacco Use   • Smoking status: Former Smoker   Substance and Sexual Activity   • Alcohol use: Yes     Comment: one daily   • Drug use: No       Past Family History:  Family History   Problem Relation Age of Onset   •  Hypertension Mother    • COPD Father        Objective:   Temp:  [98.2 °F (36.8 °C)] 98.2 °F (36.8 °C)  Heart Rate:  [74-87] 87  Resp:  [16] 16  BP: (132-134)/(72-78) 132/78   SpO2:  [94 %-97 %] 94 %  on    Device (Oxygen Therapy): room air  No intake or output data in the 24 hours ending 10/13/20 1224  Body mass index is 25.02 kg/m².      10/13/20  1124   Weight: 70.3 kg (155 lb)     Weight change:     Physical Exam  Vitals signs and nursing note reviewed.   Constitutional:       General: She is not in acute distress.     Appearance: She is well-developed. She is not diaphoretic.   HENT:      Head: Normocephalic and atraumatic.   Eyes:      General: No scleral icterus.     Conjunctiva/sclera: Conjunctivae normal.      Pupils: Pupils are equal, round, and reactive to light.   Neck:      Musculoskeletal: Neck supple.      Vascular: No JVD.      Trachea: No tracheal deviation.   Cardiovascular:      Rate and Rhythm: Normal rate and regular rhythm.      Heart sounds: Normal heart sounds. No murmur.   Pulmonary:      Effort: Pulmonary effort is normal.      Breath sounds: Normal breath sounds. No wheezing or rales.   Abdominal:      General: Bowel sounds are normal.      Palpations: Abdomen is soft.      Tenderness: There is no abdominal tenderness.      Hernia: A hernia (Right inguinal easily reducible and nontender) is present.   Musculoskeletal:         General: Swelling (R hip and trace BLE) and tenderness (right hip ) present.   Skin:     General: Skin is warm and dry.   Neurological:      Mental Status: She is alert and oriented to person, place, and time.   Psychiatric:         Behavior: Behavior normal.          Lab Review:     Results from last 7 days   Lab Units 10/13/20  1013   SODIUM mmol/L 138   POTASSIUM mmol/L 4.1   CHLORIDE mmol/L 105   CO2 mmol/L 23.0   BUN mg/dL 22   CREATININE mg/dL 0.75   GLUCOSE mg/dL 108*   CALCIUM mg/dL 9.3   AST (SGOT) U/L 20   ALT (SGPT) U/L 16     Estimated Creatinine Clearance:  59.1 mL/min (by C-G formula based on SCr of 0.75 mg/dL).                            Invalid input(s):  PHOS      Results from last 7 days   Lab Units 10/13/20  1013   WBC 10*3/mm3 6.84   HEMOGLOBIN g/dL 12.9   HEMATOCRIT % 38.9   PLATELETS 10*3/mm3 272   MCV fL 98.5*   MCH pg 32.7   MCHC g/dL 33.2   RDW % 12.6   RDW-SD fl 45.8   MPV fL 9.4   NEUTROPHIL % % 71.8   LYMPHOCYTE % % 17.5*   MONOCYTES % % 6.6   EOSINOPHIL % % 2.3   BASOPHIL % % 1.5   IMM GRAN % % 0.3   NEUTROS ABS 10*3/mm3 4.91   LYMPHS ABS 10*3/mm3 1.20   MONOS ABS 10*3/mm3 0.45   EOS ABS 10*3/mm3 0.16   BASOS ABS 10*3/mm3 0.10   IMMATURE GRANS (ABS) 10*3/mm3 0.02   NRBC /100 WBC 0.0                                         Imaging:  Imaging Results (Last 24 Hours)     Procedure Component Value Units Date/Time    XR Shoulder 2+ View Right [[id number]] Collected: 10/13/20 1036     Updated: 10/13/20 1047    Narrative:      XR CHEST 1 VW-, XR SHOULDER 2+ VW RIGHT-, XR HIP W OR WO PELVIS 2-3 VIEW  RIGHT-     HISTORY:  Preop. Hip fracture. Right shoulder pain.     COMPARISON:  Chest radiographs 06/16/2017.     FINDINGS:    Chest: A single portable view of the chest was obtained.  The cardiac silhouette is enlarged, slightly progressed from 2017. The  descending aorta is tortuous. There is calcific aortic atherosclerosis.  There is ascending aortic enlargement. There is central pulmonary venous  congestion. There is no new focal consolidation. Pleural spaces are  clear. There is multilevel degenerative disc disease.     Right shoulder: 2 views of the right shoulder were obtained.  There is mild acromioclavicular osteoarthritis. There is at least  moderate glenohumeral osteoarthritis with joint space narrowing,  subchondral sclerosis, and marginal osteophytes. There is narrowing of  the distance between the humeral head and acromion, which can be seen  with rotator cuff pathology. There is a 1.6 cm osseous fragment  projecting superior to the humeral head, which  may represent an  intra-articular body. This was not included in the field-of-view on  prior chest radiograph from 2017. No acute fracture is identified.     Right hip: 5 views of the pelvis and right hip were obtained.  There is degenerative disc disease in the lower lumbar spine,  incompletely assessed. There is a partially imaged left femoral  intramedullary carmen with a dynamic screw traversing the femoral neck.  There has been interval healing of the left intertrochanteric fracture  when compared to 2017. There is a mildly displaced acute comminuted  right intertrochanteric fracture with mild medial and dorsal angulation  of the distal fracture fragment. Joint spaces are maintained. There is  soft tissue gas projecting over the right pubic bone, concerning for a  bowel containing abdominal hernia.       Impression:      COMBINED IMPRESSION:  1.  Acute displaced right intertrochanteric femoral fracture, as above.  2.  Right shoulder osteoarthritis with findings suggestive of rotator  cuff pathology and a probable intra-articular body.  3.  Suspected bowel containing right inguinal hernia.  4.  Cardiomegaly, slightly progressed from 2017.  5.  Central pulmonary venous congestion. No focal consolidation or  effusion.     This report was finalized on 10/13/2020 10:44 AM by Dr. Francesca Gonzalez M.D.       XR Chest 1 View [[id number]] Collected: 10/13/20 1036     Updated: 10/13/20 1047    Narrative:      XR CHEST 1 VW-, XR SHOULDER 2+ VW RIGHT-, XR HIP W OR WO PELVIS 2-3 VIEW  RIGHT-     HISTORY:  Preop. Hip fracture. Right shoulder pain.     COMPARISON:  Chest radiographs 06/16/2017.     FINDINGS:    Chest: A single portable view of the chest was obtained.  The cardiac silhouette is enlarged, slightly progressed from 2017. The  descending aorta is tortuous. There is calcific aortic atherosclerosis.  There is ascending aortic enlargement. There is central pulmonary venous  congestion. There is no new focal consolidation.  Pleural spaces are  clear. There is multilevel degenerative disc disease.     Right shoulder: 2 views of the right shoulder were obtained.  There is mild acromioclavicular osteoarthritis. There is at least  moderate glenohumeral osteoarthritis with joint space narrowing,  subchondral sclerosis, and marginal osteophytes. There is narrowing of  the distance between the humeral head and acromion, which can be seen  with rotator cuff pathology. There is a 1.6 cm osseous fragment  projecting superior to the humeral head, which may represent an  intra-articular body. This was not included in the field-of-view on  prior chest radiograph from 2017. No acute fracture is identified.     Right hip: 5 views of the pelvis and right hip were obtained.  There is degenerative disc disease in the lower lumbar spine,  incompletely assessed. There is a partially imaged left femoral  intramedullary carmen with a dynamic screw traversing the femoral neck.  There has been interval healing of the left intertrochanteric fracture  when compared to 2017. There is a mildly displaced acute comminuted  right intertrochanteric fracture with mild medial and dorsal angulation  of the distal fracture fragment. Joint spaces are maintained. There is  soft tissue gas projecting over the right pubic bone, concerning for a  bowel containing abdominal hernia.       Impression:      COMBINED IMPRESSION:  1.  Acute displaced right intertrochanteric femoral fracture, as above.  2.  Right shoulder osteoarthritis with findings suggestive of rotator  cuff pathology and a probable intra-articular body.  3.  Suspected bowel containing right inguinal hernia.  4.  Cardiomegaly, slightly progressed from 2017.  5.  Central pulmonary venous congestion. No focal consolidation or  effusion.     This report was finalized on 10/13/2020 10:44 AM by Dr. Francesca Gonzalez M.D.       XR Hip With or Without Pelvis 2 - 3 View Right [[id number]] Collected: 10/13/20 1036     Updated:  10/13/20 1047    Narrative:      XR CHEST 1 VW-, XR SHOULDER 2+ VW RIGHT-, XR HIP W OR WO PELVIS 2-3 VIEW  RIGHT-     HISTORY:  Preop. Hip fracture. Right shoulder pain.     COMPARISON:  Chest radiographs 06/16/2017.     FINDINGS:    Chest: A single portable view of the chest was obtained.  The cardiac silhouette is enlarged, slightly progressed from 2017. The  descending aorta is tortuous. There is calcific aortic atherosclerosis.  There is ascending aortic enlargement. There is central pulmonary venous  congestion. There is no new focal consolidation. Pleural spaces are  clear. There is multilevel degenerative disc disease.     Right shoulder: 2 views of the right shoulder were obtained.  There is mild acromioclavicular osteoarthritis. There is at least  moderate glenohumeral osteoarthritis with joint space narrowing,  subchondral sclerosis, and marginal osteophytes. There is narrowing of  the distance between the humeral head and acromion, which can be seen  with rotator cuff pathology. There is a 1.6 cm osseous fragment  projecting superior to the humeral head, which may represent an  intra-articular body. This was not included in the field-of-view on  prior chest radiograph from 2017. No acute fracture is identified.     Right hip: 5 views of the pelvis and right hip were obtained.  There is degenerative disc disease in the lower lumbar spine,  incompletely assessed. There is a partially imaged left femoral  intramedullary carmen with a dynamic screw traversing the femoral neck.  There has been interval healing of the left intertrochanteric fracture  when compared to 2017. There is a mildly displaced acute comminuted  right intertrochanteric fracture with mild medial and dorsal angulation  of the distal fracture fragment. Joint spaces are maintained. There is  soft tissue gas projecting over the right pubic bone, concerning for a  bowel containing abdominal hernia.       Impression:      COMBINED IMPRESSION:  1.   Acute displaced right intertrochanteric femoral fracture, as above.  2.  Right shoulder osteoarthritis with findings suggestive of rotator  cuff pathology and a probable intra-articular body.  3.  Suspected bowel containing right inguinal hernia.  4.  Cardiomegaly, slightly progressed from 2017.  5.  Central pulmonary venous congestion. No focal consolidation or  effusion.     This report was finalized on 10/13/2020 10:44 AM by Dr. Francesca Gonzalez M.D.             EKG:  ECG 12 Lead   Preliminary Result   HEART RATE= 65  bpm   RR Interval= 920  ms   VT Interval= 175  ms   P Horizontal Axis= 43  deg   P Front Axis= 39  deg   QRSD Interval= 105  ms   QT Interval= 428  ms   QRS Axis= -30  deg   T Wave Axis= 36  deg   - OTHERWISE NORMAL ECG -   Sinus rhythm   Left axis deviation   Electronically Signed By:    Date and Time of Study: 2020-10-13 11:14:22          I reviewed the patient's new clinical results and medications.  I reviewed the patient's new imaging results and agree with the interpretation.  I personally viewed and interpreted the patient's EKG/Telemetry data.    Problem List:     Active Hospital Problems    Diagnosis  POA   • Closed intertrochanteric fracture of hip, right, initial encounter (CMS/[facility]) [S72.141A]  Yes   • Inguinal hernia [K40.90]  Unknown   • Fall [W19.XXXA]  Unknown   • Hyperglycemia [R73.9]  Unknown   • Chronic alcohol use [Z72.89]  Unknown      Resolved Hospital Problems   No resolved problems to display.       Assessment and Plan:       Closed intertrochanteric fracture of hip, right, initial encounter   -evaluated by orthopedics and plan for IM nailing later today. Denies any prior issues with anesthesia. EKG unremarkable and CXR showed mild vascular congestion and appears to have some prominent interstitium on prior CXR. sats good on room air. overall low cardiac risk score and ok to proceed with surgery.   -PRN pain management and bowel regimen  -COVID 19 test pending, but denies and  recent exposures.   -encouraged pulmonary hygiene measures  -Reports prior issues with urinary retention after last surgery will need to monitor closely postop.      Inguinal hernia, right  -unknown issue to patient. Reducible and nontender. Small amount of bowel noted in hernia on CT, but does not appear to be incarcerated. Educated patient about avoiding heavy lifting and monitoring symptoms. Would consider general surgery evaluation if has any further issues as outpatient and avoid constipation.      Fall  -Chronic arthritis of right shoulder with no obvious fracture.  Denies any head injury or other bony prominence pain.       Hyperglycemia  -Mild and may be from recent oral intake.  Denies any history of diabetes.  -BMP in a.m.      Chronic alcohol use  -Mild macrocytosis.  Denies any prior withdrawal symptoms.  -CIWA protocol and vitamin replacement      DVT prophylaxis: SCDs  Code status addressed: Full code  Diet: N.p.o.  Consult CCP to help with DC planning- pt would like to try to manage again at home like prior nailing.     I discussed the patients findings and my recommendations with patient.      MARCELO Fuentes  [facility]  10/13/20  12:24 PM EDT    Dictated utilizing Dragon dictation

## 2020-10-13 NOTE — ED PROVIDER NOTES
EMERGENCY DEPARTMENT ENCOUNTER    Room Number:  32/32  Date of encounter:  10/13/2020  PCP: Provider, No Known  Historian: Patient      I used full protective equipment while examining this patient.  This includes face mask, gloves and protective eyewear.  I washed my hands before entering the room and immediately upon leaving the room      HPI:  Chief Complaint: Fall  A complete HPI/ROS/PMH/PSH/SH/FH are unobtainable due to: Nothing    Context: Carla Campos is a 85 y.o. female who presents to the ED c/o injury sustained in a mechanical fall just prior to arrival.  Patient states she was unloading a horse out of a trailer, when the horse hit the trailer door knocked her down.  Patient landed on her right hip and right shoulder.  She denies any head, neck, trunk injury.  Patient states the right hip pain is severe and sharp.  She states it is worse when she moves, and improves when she is at rest.  Patient states she is unable to walk after the fall secondary to right hip pain.  She denies any numbness or tingling to her right lower extremity or right upper extremity.  Patient denies any previous history of right hip injuries.  She denies any fever, nausea, vomiting, cough.  Patient states she is otherwise healthy.  Patient did have a left hip fracture in 2017 which was repaired by Dr. Andrews.    Review of Medical Records  I reviewed last hospitalization from 6/16/2017.  Patient had a fall with a left hip fracture.    PAST MEDICAL HISTORY  Active Ambulatory Problems     Diagnosis Date Noted   • Closed left hip fracture (CMS/[facility]) 06/17/2017     Resolved Ambulatory Problems     Diagnosis Date Noted   • No Resolved Ambulatory Problems     No Additional Past Medical History         PAST SURGICAL HISTORY  Past Surgical History:   Procedure Laterality Date   • BLADDER REPAIR     • COLONOSCOPY     • HYSTERECTOMY     • LA OPEN FIX INTER/SUBTROCH FX,IMPLNT Left 6/17/2017    Procedure: LEFT FEMUR INTRAMEDULLARY  NAILING/RODDING;  Surgeon: Zabrina Andrews MD;  Location: [facility] OR;  Service: Orthopedics   • TUBAL ABDOMINAL LIGATION Bilateral     age mid twenties         FAMILY HISTORY  Family History   Problem Relation Age of Onset   • Hypertension Mother    • COPD Father          SOCIAL HISTORY  Social History     Socioeconomic History   • Marital status:      Spouse name: Not on file   • Number of children: Not on file   • Years of education: Not on file   • Highest education level: Not on file   Tobacco Use   • Smoking status: Former Smoker   Substance and Sexual Activity   • Alcohol use: Yes     Comment: one daily   • Drug use: No         ALLERGIES  Patient has no known allergies.        REVIEW OF SYSTEMS  All systems reviewed and negative except for those discussed in HPI.       PHYSICAL EXAM    I have reviewed the triage vital signs and nursing notes.    ED Triage Vitals [10/13/20 0919]   Temp Heart Rate Resp BP SpO2   98.2 °F (36.8 °C) 74 16 134/72 97 %      Temp src Heart Rate Source Patient Position BP Location FiO2 (%)   Tympanic Monitor -- -- --       Physical Exam  GENERAL: Alert, oriented, not distressed  HENT: nares patent, head atraumatic  EYES: no scleral icterus, EOMI  CV: regular rhythm, regular rate, no murmur  RESPIRATORY: normal effort, CTA  ABDOMEN: soft, nontender  MUSCULOSKELETAL: Right lower extremity shortened and externally rotated.  Moderate tenderness to the right lateral hip.  Neurovascularly intact distally.  Mild diffuse right shoulder tenderness with full range of motion.  No deformity noted.  Neurovascular intact distally.  NEURO: alert, moves all extremities, follows commands  SKIN: warm, dry        LAB RESULTS  Recent Results (from the past 24 hour(s))   Light Blue Top    Collection Time: 10/13/20 10:13 AM   Result Value Ref Range    Extra Tube hold for add-on    Green Top (Gel)    Collection Time: 10/13/20 10:13 AM   Result Value Ref Range    Extra Tube Hold for  add-ons.    Lavender Top    Collection Time: 10/13/20 10:13 AM   Result Value Ref Range    Extra Tube hold for add-on    Type & Screen    Collection Time: 10/13/20 10:13 AM    Specimen: Blood   Result Value Ref Range    ABO Type A     RH type Positive     Antibody Screen Negative     T&S Expiration Date 10/16/2020 11:59:59 PM    Comprehensive Metabolic Panel    Collection Time: 10/13/20 10:13 AM    Specimen: Blood   Result Value Ref Range    Glucose 108 (H) 65 - 99 mg/dL    BUN 22 8 - 23 mg/dL    Creatinine 0.75 0.57 - 1.00 mg/dL    Sodium 138 136 - 145 mmol/L    Potassium 4.1 3.5 - 5.2 mmol/L    Chloride 105 98 - 107 mmol/L    CO2 23.0 22.0 - 29.0 mmol/L    Calcium 9.3 8.6 - 10.5 mg/dL    Total Protein 7.2 6.0 - 8.5 g/dL    Albumin 4.00 3.50 - 5.20 g/dL    ALT (SGPT) 16 1 - 33 U/L    AST (SGOT) 20 1 - 32 U/L    Alkaline Phosphatase 67 39 - 117 U/L    Total Bilirubin 0.3 0.0 - 1.2 mg/dL    eGFR Non African Amer 74 >60 mL/min/1.73    Globulin 3.2 gm/dL    A/G Ratio 1.3 g/dL    BUN/Creatinine Ratio 29.3 (H) 7.0 - 25.0    Anion Gap 10.0 5.0 - 15.0 mmol/L   CBC Auto Differential    Collection Time: 10/13/20 10:13 AM    Specimen: Blood   Result Value Ref Range    WBC 6.84 3.40 - 10.80 10*3/mm3    RBC 3.95 3.77 - 5.28 10*6/mm3    Hemoglobin 12.9 12.0 - 15.9 g/dL    Hematocrit 38.9 34.0 - 46.6 %    MCV 98.5 (H) 79.0 - 97.0 fL    MCH 32.7 26.6 - 33.0 pg    MCHC 33.2 31.5 - 35.7 g/dL    RDW 12.6 12.3 - 15.4 %    RDW-SD 45.8 37.0 - 54.0 fl    MPV 9.4 6.0 - 12.0 fL    Platelets 272 140 - 450 10*3/mm3    Neutrophil % 71.8 42.7 - 76.0 %    Lymphocyte % 17.5 (L) 19.6 - 45.3 %    Monocyte % 6.6 5.0 - 12.0 %    Eosinophil % 2.3 0.3 - 6.2 %    Basophil % 1.5 0.0 - 1.5 %    Immature Grans % 0.3 0.0 - 0.5 %    Neutrophils, Absolute 4.91 1.70 - 7.00 10*3/mm3    Lymphocytes, Absolute 1.20 0.70 - 3.10 10*3/mm3    Monocytes, Absolute 0.45 0.10 - 0.90 10*3/mm3    Eosinophils, Absolute 0.16 0.00 - 0.40 10*3/mm3    Basophils, Absolute 0.10  0.00 - 0.20 10*3/mm3    Immature Grans, Absolute 0.02 0.00 - 0.05 10*3/mm3    nRBC 0.0 0.0 - 0.2 /100 WBC       Ordered the above labs and independently reviewed the results.        RADIOLOGY  Xr Shoulder 2+ View Right    Result Date: 10/13/2020  XR CHEST 1 VW-, XR SHOULDER 2+ VW RIGHT-, XR HIP W OR WO PELVIS 2-3 VIEW RIGHT-  HISTORY:  Preop. Hip fracture. Right shoulder pain.  COMPARISON:  Chest radiographs 06/16/2017.  FINDINGS:  Chest: A single portable view of the chest was obtained. The cardiac silhouette is enlarged, slightly progressed from 2017. The descending aorta is tortuous. There is calcific aortic atherosclerosis. There is ascending aortic enlargement. There is central pulmonary venous congestion. There is no new focal consolidation. Pleural spaces are clear. There is multilevel degenerative disc disease.  Right shoulder: 2 views of the right shoulder were obtained. There is mild acromioclavicular osteoarthritis. There is at least moderate glenohumeral osteoarthritis with joint space narrowing, subchondral sclerosis, and marginal osteophytes. There is narrowing of the distance between the humeral head and acromion, which can be seen with rotator cuff pathology. There is a 1.6 cm osseous fragment projecting superior to the humeral head, which may represent an intra-articular body. This was not included in the field-of-view on prior chest radiograph from 2017. No acute fracture is identified.  Right hip: 5 views of the pelvis and right hip were obtained. There is degenerative disc disease in the lower lumbar spine, incompletely assessed. There is a partially imaged left femoral intramedullary carmen with a dynamic screw traversing the femoral neck. There has been interval healing of the left intertrochanteric fracture when compared to 2017. There is a mildly displaced acute comminuted right intertrochanteric fracture with mild medial and dorsal angulation of the distal fracture fragment. Joint spaces are  maintained. There is soft tissue gas projecting over the right pubic bone, concerning for a bowel containing abdominal hernia.      COMBINED IMPRESSION: 1.  Acute displaced right intertrochanteric femoral fracture, as above. 2.  Right shoulder osteoarthritis with findings suggestive of rotator cuff pathology and a probable intra-articular body. 3.  Suspected bowel containing right inguinal hernia. 4.  Cardiomegaly, slightly progressed from 2017. 5.  Central pulmonary venous congestion. No focal consolidation or effusion.  This report was finalized on 10/13/2020 10:44 AM by Dr. Francesca Gonzalez M.D.      Xr Chest 1 View    Result Date: 10/13/2020  XR CHEST 1 VW-, XR SHOULDER 2+ VW RIGHT-, XR HIP W OR WO PELVIS 2-3 VIEW RIGHT-  HISTORY:  Preop. Hip fracture. Right shoulder pain.  COMPARISON:  Chest radiographs 06/16/2017.  FINDINGS:  Chest: A single portable view of the chest was obtained. The cardiac silhouette is enlarged, slightly progressed from 2017. The descending aorta is tortuous. There is calcific aortic atherosclerosis. There is ascending aortic enlargement. There is central pulmonary venous congestion. There is no new focal consolidation. Pleural spaces are clear. There is multilevel degenerative disc disease.  Right shoulder: 2 views of the right shoulder were obtained. There is mild acromioclavicular osteoarthritis. There is at least moderate glenohumeral osteoarthritis with joint space narrowing, subchondral sclerosis, and marginal osteophytes. There is narrowing of the distance between the humeral head and acromion, which can be seen with rotator cuff pathology. There is a 1.6 cm osseous fragment projecting superior to the humeral head, which may represent an intra-articular body. This was not included in the field-of-view on prior chest radiograph from 2017. No acute fracture is identified.  Right hip: 5 views of the pelvis and right hip were obtained. There is degenerative disc disease in the lower  lumbar spine, incompletely assessed. There is a partially imaged left femoral intramedullary carmen with a dynamic screw traversing the femoral neck. There has been interval healing of the left intertrochanteric fracture when compared to 2017. There is a mildly displaced acute comminuted right intertrochanteric fracture with mild medial and dorsal angulation of the distal fracture fragment. Joint spaces are maintained. There is soft tissue gas projecting over the right pubic bone, concerning for a bowel containing abdominal hernia.      COMBINED IMPRESSION: 1.  Acute displaced right intertrochanteric femoral fracture, as above. 2.  Right shoulder osteoarthritis with findings suggestive of rotator cuff pathology and a probable intra-articular body. 3.  Suspected bowel containing right inguinal hernia. 4.  Cardiomegaly, slightly progressed from 2017. 5.  Central pulmonary venous congestion. No focal consolidation or effusion.  This report was finalized on 10/13/2020 10:44 AM by Dr. Francesca Gonzalez M.D.      Xr Hip With Or Without Pelvis 2 - 3 View Right    Result Date: 10/13/2020  XR CHEST 1 VW-, XR SHOULDER 2+ VW RIGHT-, XR HIP W OR WO PELVIS 2-3 VIEW RIGHT-  HISTORY:  Preop. Hip fracture. Right shoulder pain.  COMPARISON:  Chest radiographs 06/16/2017.  FINDINGS:  Chest: A single portable view of the chest was obtained. The cardiac silhouette is enlarged, slightly progressed from 2017. The descending aorta is tortuous. There is calcific aortic atherosclerosis. There is ascending aortic enlargement. There is central pulmonary venous congestion. There is no new focal consolidation. Pleural spaces are clear. There is multilevel degenerative disc disease.  Right shoulder: 2 views of the right shoulder were obtained. There is mild acromioclavicular osteoarthritis. There is at least moderate glenohumeral osteoarthritis with joint space narrowing, subchondral sclerosis, and marginal osteophytes. There is narrowing of the  distance between the humeral head and acromion, which can be seen with rotator cuff pathology. There is a 1.6 cm osseous fragment projecting superior to the humeral head, which may represent an intra-articular body. This was not included in the field-of-view on prior chest radiograph from 2017. No acute fracture is identified.  Right hip: 5 views of the pelvis and right hip were obtained. There is degenerative disc disease in the lower lumbar spine, incompletely assessed. There is a partially imaged left femoral intramedullary carmen with a dynamic screw traversing the femoral neck. There has been interval healing of the left intertrochanteric fracture when compared to 2017. There is a mildly displaced acute comminuted right intertrochanteric fracture with mild medial and dorsal angulation of the distal fracture fragment. Joint spaces are maintained. There is soft tissue gas projecting over the right pubic bone, concerning for a bowel containing abdominal hernia.      COMBINED IMPRESSION: 1.  Acute displaced right intertrochanteric femoral fracture, as above. 2.  Right shoulder osteoarthritis with findings suggestive of rotator cuff pathology and a probable intra-articular body. 3.  Suspected bowel containing right inguinal hernia. 4.  Cardiomegaly, slightly progressed from 2017. 5.  Central pulmonary venous congestion. No focal consolidation or effusion.  This report was finalized on 10/13/2020 10:44 AM by Dr. Francesca Gonzalez M.D.        I ordered the above noted radiological studies. Reviewed by me and discussed with radiologist.  See dictation for official radiology interpretation.      MEDICATIONS GIVEN IN ER    Medications   morphine injection 4 mg (4 mg Intravenous Given 10/13/20 1033)   ondansetron (ZOFRAN) injection 4 mg (4 mg Intravenous Given 10/13/20 1033)         PROGRESS, DATA ANALYSIS, CONSULTS, AND MEDICAL DECISION MAKING    All labs have been independently reviewed by me.  All radiology studies have been  reviewed by me and discussed with radiologist dictating the report.   EKG's independently viewed and interpreted by me.  Discussion below represents my analysis of pertinent findings related to patient's condition, differential diagnosis, treatment plan and final disposition.    I have discussed case with Dr. Sun, emergency room physician.  He has performed his own bedside examination and agrees with treatment plan.    ED Course as of Oct 13 1153   Tue Oct 13, 2020   1013 X-ray of the right hip shows an intertrochanteric fracture.  This was interpreted by myself.    [TD]   1014 Patient presents with mechanical fall and right hip fracture.  Plan to discuss with orthopedics and admit.  Patient has no other significant injury.    [EE]   1049 WBC: 6.84 [EE]   1049 Hemoglobin: 12.9 [EE]   1051 I discussed case with Rosa Maria, nurse practitioner at [facility].  She agrees to admit the patient to Dr. Everett.    [EE]   1106 Glucose(!): 108 [EE]   1106 Creatinine: 0.75 [EE]   1119 EKG interpreted by myself.  Time 1114.  Sinus rhythm, 65 bpm.  Normal P/ROXANE.  QRS normal, with left axis deviation.  No significant ST abnormalities.  EKG similar to previous EKG from 6/16/2017.    [EE]   1134 I discussed case with Dr. Andrews, orthopedics.  He agrees to consult on the patient.  He states he would likely be able to do surgery today.    [EE]      ED Course User Index  [EE] Edgar Xie PA  [TD] Jerry Sun II, MD       AS OF 11:53 EDT VITALS:    BP - 132/78  HR - 87  TEMP - 98.2 °F (36.8 °C) (Tympanic)  O2 SATS - 94%        DIAGNOSIS  Final diagnoses:   Closed intertrochanteric fracture of hip, right, initial encounter (CMS/[facility])   Fall, initial encounter   Contusion of right shoulder, initial encounter         DISPOSITION  Admitted           Edgar Xie PA  10/13/20 1153

## 2020-10-13 NOTE — ED NOTES
Patient was placed in face mask in first look. Patient was wearing facemask when I entered the room and throughout our encounter. I wore full protective equipment throughout this patient encounter including a face mask, eye shield, gown and gloves. Hand hygiene was performed before donning protective equipment and after removal when leaving the room.       Reta De Leon RN  10/13/20 [id number]

## 2020-10-13 NOTE — ED PROVIDER NOTES
MD ATTESTATION NOTE    The ZARIA and I have discussed this patient's history, physical exam, and treatment plan.  I have reviewed the documentation and personally had a face to face interaction with the patient. I affirm the documentation and agree with the treatment and plan.  The attached note describes my personal findings.      Carla Campos is a 85 y.o. female who presents to the ED c/o right hip pain that occurred after a fall just prior to arrival.  She is trying a little horse in a trailer when it knocked her down.  She states that her pain is currently well controlled.      On exam:  Right hip is externally rotated and shortened  2+ right DP pulse    Labs  Recent Results (from the past 24 hour(s))   Type & Screen    Collection Time: 10/13/20 10:13 AM    Specimen: Blood   Result Value Ref Range    ABO Type A     RH type Positive     Antibody Screen Negative     T&S Expiration Date 10/16/2020 11:59:59 PM    Comprehensive Metabolic Panel    Collection Time: 10/13/20 10:13 AM    Specimen: Blood   Result Value Ref Range    Glucose 108 (H) 65 - 99 mg/dL    BUN 22 8 - 23 mg/dL    Creatinine 0.75 0.57 - 1.00 mg/dL    Sodium 138 136 - 145 mmol/L    Potassium 4.1 3.5 - 5.2 mmol/L    Chloride 105 98 - 107 mmol/L    CO2 23.0 22.0 - 29.0 mmol/L    Calcium 9.3 8.6 - 10.5 mg/dL    Total Protein 7.2 6.0 - 8.5 g/dL    Albumin 4.00 3.50 - 5.20 g/dL    ALT (SGPT) 16 1 - 33 U/L    AST (SGOT) 20 1 - 32 U/L    Alkaline Phosphatase 67 39 - 117 U/L    Total Bilirubin 0.3 0.0 - 1.2 mg/dL    eGFR Non African Amer 74 >60 mL/min/1.73    Globulin 3.2 gm/dL    A/G Ratio 1.3 g/dL    BUN/Creatinine Ratio 29.3 (H) 7.0 - 25.0    Anion Gap 10.0 5.0 - 15.0 mmol/L   CBC Auto Differential    Collection Time: 10/13/20 10:13 AM    Specimen: Blood   Result Value Ref Range    WBC 6.84 3.40 - 10.80 10*3/mm3    RBC 3.95 3.77 - 5.28 10*6/mm3    Hemoglobin 12.9 12.0 - 15.9 g/dL    Hematocrit 38.9 34.0 - 46.6 %    MCV 98.5 (H) 79.0 - 97.0 fL    MCH 32.7  26.6 - 33.0 pg    MCHC 33.2 31.5 - 35.7 g/dL    RDW 12.6 12.3 - 15.4 %    RDW-SD 45.8 37.0 - 54.0 fl    MPV 9.4 6.0 - 12.0 fL    Platelets 272 140 - 450 10*3/mm3    Neutrophil % 71.8 42.7 - 76.0 %    Lymphocyte % 17.5 (L) 19.6 - 45.3 %    Monocyte % 6.6 5.0 - 12.0 %    Eosinophil % 2.3 0.3 - 6.2 %    Basophil % 1.5 0.0 - 1.5 %    Immature Grans % 0.3 0.0 - 0.5 %    Neutrophils, Absolute 4.91 1.70 - 7.00 10*3/mm3    Lymphocytes, Absolute 1.20 0.70 - 3.10 10*3/mm3    Monocytes, Absolute 0.45 0.10 - 0.90 10*3/mm3    Eosinophils, Absolute 0.16 0.00 - 0.40 10*3/mm3    Basophils, Absolute 0.10 0.00 - 0.20 10*3/mm3    Immature Grans, Absolute 0.02 0.00 - 0.05 10*3/mm3    nRBC 0.0 0.0 - 0.2 /100 WBC       Radiology  Xr Shoulder 2+ View Right    Result Date: 10/13/2020  XR CHEST 1 VW-, XR SHOULDER 2+ VW RIGHT-, XR HIP W OR WO PELVIS 2-3 VIEW RIGHT-  HISTORY:  Preop. Hip fracture. Right shoulder pain.  COMPARISON:  Chest radiographs 06/16/2017.  FINDINGS:  Chest: A single portable view of the chest was obtained. The cardiac silhouette is enlarged, slightly progressed from 2017. The descending aorta is tortuous. There is calcific aortic atherosclerosis. There is ascending aortic enlargement. There is central pulmonary venous congestion. There is no new focal consolidation. Pleural spaces are clear. There is multilevel degenerative disc disease.  Right shoulder: 2 views of the right shoulder were obtained. There is mild acromioclavicular osteoarthritis. There is at least moderate glenohumeral osteoarthritis with joint space narrowing, subchondral sclerosis, and marginal osteophytes. There is narrowing of the distance between the humeral head and acromion, which can be seen with rotator cuff pathology. There is a 1.6 cm osseous fragment projecting superior to the humeral head, which may represent an intra-articular body. This was not included in the field-of-view on prior chest radiograph from 2017. No acute fracture is  identified.  Right hip: 5 views of the pelvis and right hip were obtained. There is degenerative disc disease in the lower lumbar spine, incompletely assessed. There is a partially imaged left femoral intramedullary carmen with a dynamic screw traversing the femoral neck. There has been interval healing of the left intertrochanteric fracture when compared to 2017. There is a mildly displaced acute comminuted right intertrochanteric fracture with mild medial and dorsal angulation of the distal fracture fragment. Joint spaces are maintained. There is soft tissue gas projecting over the right pubic bone, concerning for a bowel containing abdominal hernia.      COMBINED IMPRESSION: 1.  Acute displaced right intertrochanteric femoral fracture, as above. 2.  Right shoulder osteoarthritis with findings suggestive of rotator cuff pathology and a probable intra-articular body. 3.  Suspected bowel containing right inguinal hernia. 4.  Cardiomegaly, slightly progressed from 2017. 5.  Central pulmonary venous congestion. No focal consolidation or effusion.  This report was finalized on 10/13/2020 10:44 AM by Dr. Francesca Gonzalez M.D.      Xr Chest 1 View    Result Date: 10/13/2020  XR CHEST 1 VW-, XR SHOULDER 2+ VW RIGHT-, XR HIP W OR WO PELVIS 2-3 VIEW RIGHT-  HISTORY:  Preop. Hip fracture. Right shoulder pain.  COMPARISON:  Chest radiographs 06/16/2017.  FINDINGS:  Chest: A single portable view of the chest was obtained. The cardiac silhouette is enlarged, slightly progressed from 2017. The descending aorta is tortuous. There is calcific aortic atherosclerosis. There is ascending aortic enlargement. There is central pulmonary venous congestion. There is no new focal consolidation. Pleural spaces are clear. There is multilevel degenerative disc disease.  Right shoulder: 2 views of the right shoulder were obtained. There is mild acromioclavicular osteoarthritis. There is at least moderate glenohumeral osteoarthritis with joint space  narrowing, subchondral sclerosis, and marginal osteophytes. There is narrowing of the distance between the humeral head and acromion, which can be seen with rotator cuff pathology. There is a 1.6 cm osseous fragment projecting superior to the humeral head, which may represent an intra-articular body. This was not included in the field-of-view on prior chest radiograph from 2017. No acute fracture is identified.  Right hip: 5 views of the pelvis and right hip were obtained. There is degenerative disc disease in the lower lumbar spine, incompletely assessed. There is a partially imaged left femoral intramedullary carmen with a dynamic screw traversing the femoral neck. There has been interval healing of the left intertrochanteric fracture when compared to 2017. There is a mildly displaced acute comminuted right intertrochanteric fracture with mild medial and dorsal angulation of the distal fracture fragment. Joint spaces are maintained. There is soft tissue gas projecting over the right pubic bone, concerning for a bowel containing abdominal hernia.      COMBINED IMPRESSION: 1.  Acute displaced right intertrochanteric femoral fracture, as above. 2.  Right shoulder osteoarthritis with findings suggestive of rotator cuff pathology and a probable intra-articular body. 3.  Suspected bowel containing right inguinal hernia. 4.  Cardiomegaly, slightly progressed from 2017. 5.  Central pulmonary venous congestion. No focal consolidation or effusion.  This report was finalized on 10/13/2020 10:44 AM by Dr. Francesca Gonzalez M.D.      Xr Hip With Or Without Pelvis 2 - 3 View Right    Result Date: 10/13/2020  XR CHEST 1 VW-, XR SHOULDER 2+ VW RIGHT-, XR HIP W OR WO PELVIS 2-3 VIEW RIGHT-  HISTORY:  Preop. Hip fracture. Right shoulder pain.  COMPARISON:  Chest radiographs 06/16/2017.  FINDINGS:  Chest: A single portable view of the chest was obtained. The cardiac silhouette is enlarged, slightly progressed from 2017. The descending  aorta is tortuous. There is calcific aortic atherosclerosis. There is ascending aortic enlargement. There is central pulmonary venous congestion. There is no new focal consolidation. Pleural spaces are clear. There is multilevel degenerative disc disease.  Right shoulder: 2 views of the right shoulder were obtained. There is mild acromioclavicular osteoarthritis. There is at least moderate glenohumeral osteoarthritis with joint space narrowing, subchondral sclerosis, and marginal osteophytes. There is narrowing of the distance between the humeral head and acromion, which can be seen with rotator cuff pathology. There is a 1.6 cm osseous fragment projecting superior to the humeral head, which may represent an intra-articular body. This was not included in the field-of-view on prior chest radiograph from 2017. No acute fracture is identified.  Right hip: 5 views of the pelvis and right hip were obtained. There is degenerative disc disease in the lower lumbar spine, incompletely assessed. There is a partially imaged left femoral intramedullary carmen with a dynamic screw traversing the femoral neck. There has been interval healing of the left intertrochanteric fracture when compared to 2017. There is a mildly displaced acute comminuted right intertrochanteric fracture with mild medial and dorsal angulation of the distal fracture fragment. Joint spaces are maintained. There is soft tissue gas projecting over the right pubic bone, concerning for a bowel containing abdominal hernia.      COMBINED IMPRESSION: 1.  Acute displaced right intertrochanteric femoral fracture, as above. 2.  Right shoulder osteoarthritis with findings suggestive of rotator cuff pathology and a probable intra-articular body. 3.  Suspected bowel containing right inguinal hernia. 4.  Cardiomegaly, slightly progressed from 2017. 5.  Central pulmonary venous congestion. No focal consolidation or effusion.  This report was finalized on 10/13/2020 10:44 AM  by Dr. Francesca Gonzalez M.D.        Medical Decision Making:  ED Course as of Oct 14 1042   Tue Oct 13, 2020   1013 X-ray of the right hip shows an intertrochanteric fracture.  This was interpreted by myself.    [TD]   1014 Patient presents with mechanical fall and right hip fracture.  Plan to discuss with orthopedics and admit.  Patient has no other significant injury.    [EE]   1049 WBC: 6.84 [EE]   1049 Hemoglobin: 12.9 [EE]   1051 I discussed case with Rosa Maria, nurse practitioner at [facility]  She agrees to admit the patient to Dr. Everett.    [EE]   1106 Glucose(!): 108 [EE]   1106 Creatinine: 0.75 [EE]   1119 EKG interpreted by myself.  Time 1114.  Sinus rhythm, 65 bpm.  Normal P/ROXANE.  QRS normal, with left axis deviation.  No significant ST abnormalities.  EKG similar to previous EKG from 6/16/2017.    [EE]   1134 I discussed case with Dr. Andrews, orthopedics.  He agrees to consult on the patient.  He states he would likely be able to do surgery today.    [EE]      ED Course User Index  [EE] Edgar Xie PA  [TD] Jerry Sun II, MD           PPE: Both the patient and I wore a surgical mask throughout the entire patient encounter. I wore protective goggles.     Diagnosis  Final diagnoses:   Closed intertrochanteric fracture of hip, right, initial encounter (CMS/[facility])   Fall, initial encounter   Contusion of right shoulder, initial encounter        Jerry Sun II, MD  10/14/20 1042

## 2020-10-13 NOTE — CONSULTS
Orthopedic Consult    Patient: Carla Campos                                           YOB: 1935     Date of Admission: 10/13/2020  9:50 AM            Medical Record Number: [id number]    Attending Physician: Blaise Everett MD    Consulting Physician: Zabrina Andrews MD    Reason for Consult: RIGHT  hip fracture    History of Present Illness: 85 y.o. female admitted to [facility] with Closed intertrochanteric fracture of hip, right, initial encounter (CMS/[facility]) [S72.141A].     The patient was evaluated in the emergency room and was diagnosed with a  hip fracture.   Secondary to the age and multiple medical co morbidities the patient was admitted to the hospitalist.   As I was on call for the emergency room I was consulted for further evaluation and treatment.     The patient was in the usual state of health and fell from standing height in when she was loading something into a horse trailer, Resulting in sudden onset hip pain and inability to ambulate.   Denies any history of loss of consciousness, headache, vomiting, or seizures.   Denies any other injuries.   The patient is not accompanied by  family members  to this hospital visit.     The patient denies any prior  pre-existing pain in the hip.  Patient  is a community ambulator. Patient denies  walker/cane assistive device.   The patient  lives at home with  Her  , is quite active and independent in activities of daily living.  The patient denies history of dementia.    She is known to me from her LEFT hip intertrochanteric fracture that was managed with internal fixation.  About 3-1/2 years back    Patient denies any history of: DVT/PE, MRSA, COPD, CHF, CAD, Diabetes mellitus, Dementia or A-Fib.   The patient has history of :  The patient is not on anticoagulants:     Past medical history, past surgical history, social history, family history, ALLERGIES, current medications have been reviewed by me.    History  "reviewed. No pertinent past medical history.  Past Surgical History:   Procedure Laterality Date   • BLADDER REPAIR     • COLONOSCOPY     • HYSTERECTOMY     • IN OPEN FIX INTER/SUBTROCH FX,IMPLNT Left 6/17/2017    Procedure: LEFT FEMUR INTRAMEDULLARY NAILING/RODDING;  Surgeon: Zabrina Andrews MD;  Location: [facility];  Service: Orthopedics   • TUBAL ABDOMINAL LIGATION Bilateral     age mid twenties     Social History     Occupational History   • Not on file   Tobacco Use   • Smoking status: Former Smoker   Substance and Sexual Activity   • Alcohol use: Yes     Comment: one daily   • Drug use: No   • Sexual activity: Not on file      Social History     Social History Narrative   • Not on file     Family History   Problem Relation Age of Onset   • Hypertension Mother    • COPD Father         No Known Allergies    Home Medications:  (Not in a hospital admission)      Current Medications:  Scheduled Meds:  Continuous Infusions:No current facility-administered medications for this encounter.     PRN Meds:.    Review of Systems:   A 12 point system review was reviewed with the patient and from the chart  and is negative except as in history of present illness.      Physical Exam: 85 y.o. female                    Vitals:    10/13/20 0919 10/13/20 1119 10/13/20 1124   BP: 134/72 132/78    Pulse: 74 87    Resp: 16 16    Temp: 98.2 °F (36.8 °C)     TempSrc: Tympanic     SpO2: 97% 94%    Weight:   70.3 kg (155 lb)   Height:   167.6 cm (66\")        Gait: Could not be tested , patient is nonambulatory.    Mental/HEENT/cardio/skin: The patient's general appearance was well-nourished, well-hydrated, no acute distress.  Orientation was alert and oriented ×3.  The patient's mood was normal.   Pulmonary exam shows normal late exchange, no labored breathing, or shortness of breath.    The skin exam showed normal temperature and color in the area of examination.    Extremities: RIGHT   lower extremity positive shortening, " positive external rotation, attempted movements of the  hip are painful and restricted. The patient is able to do gentle active range of motion of her toes. Gross sensation is intact over the toes.    Pulses: Pulses palpable and equal bilaterally    Diagnostic Tests:    Results from last 7 days   Lab Units 10/13/20  1013   WBC 10*3/mm3 6.84   HEMOGLOBIN g/dL 12.9   HEMATOCRIT % 38.9   PLATELETS 10*3/mm3 272     Results from last 7 days   Lab Units 10/13/20  1013   SODIUM mmol/L 138   POTASSIUM mmol/L 4.1   CHLORIDE mmol/L 105   CO2 mmol/L 23.0   BUN mg/dL 22   CREATININE mg/dL 0.75   GLUCOSE mg/dL 108*   CALCIUM mg/dL 9.3     No results found for: URICACID  No results found for: CRYSTAL  Microbiology Results (last 10 days)     ** No results found for the last 240 hours. **            Xr Shoulder 2+ View Right    Result Date: 10/13/2020  COMBINED IMPRESSION: 1.  Acute displaced right intertrochanteric femoral fracture, as above. 2.  Right shoulder osteoarthritis with findings suggestive of rotator cuff pathology and a probable intra-articular body. 3.  Suspected bowel containing right inguinal hernia. 4.  Cardiomegaly, slightly progressed from 2017. 5.  Central pulmonary venous congestion. No focal consolidation or effusion.  This report was finalized on 10/13/2020 10:44 AM by Dr. Francesca Gonzalez M.D.      Xr Chest 1 View    Result Date: 10/13/2020  COMBINED IMPRESSION: 1.  Acute displaced right intertrochanteric femoral fracture, as above. 2.  Right shoulder osteoarthritis with findings suggestive of rotator cuff pathology and a probable intra-articular body. 3.  Suspected bowel containing right inguinal hernia. 4.  Cardiomegaly, slightly progressed from 2017. 5.  Central pulmonary venous congestion. No focal consolidation or effusion.  This report was finalized on 10/13/2020 10:44 AM by Dr. Francesca Gonzalez M.D.      Xr Hip With Or Without Pelvis 2 - 3 View Right    Result Date: 10/13/2020  COMBINED IMPRESSION: 1.   Acute displaced right intertrochanteric femoral fracture, as above. 2.  Right shoulder osteoarthritis with findings suggestive of rotator cuff pathology and a probable intra-articular body. 3.  Suspected bowel containing right inguinal hernia. 4.  Cardiomegaly, slightly progressed from 2017. 5.  Central pulmonary venous congestion. No focal consolidation or effusion.  This report was finalized on 10/13/2020 10:44 AM by Dr. Francesca Gonzalez M.D.        The labs, and x-rays for preoperative evaluation have been reviewed by me.     Assessment: RIGHT Closed comminuted intertrochanteric  Femur Fracture with displacement.     Patient Active Problem List   Diagnosis   • Closed left hip fracture (CMS/[facility])   • Closed intertrochanteric fracture of hip, right, initial encounter (CMS/[facility])       Plan:    Options and alternatives have been discussed in detail with the patient and family members.   The patient is indicated for a RIGHT hip open reduction and internal fixation.   The patient/family voiced understanding of the risks, benefits, and alternative forms of treatment that were discussed including but not limited to infection, DVT, pulmonary embolism, malunion, nonunion, leg length discrepancy, possibility of injury to nerves and vessels, periprosthetic fractures have been discussed in detail. Despite the above risks the patient/family consents to proceed with  hip surgical intervention.   The patient is scheduled for the above surgery tentatively for October 13, 2020.   Patient will be made NPO.  Obtain informed consent.   The patient's admitting service has seen the patient and the patient is cleared to the operating room.    Date: 10/13/2020    Zabrina Andrews MD    CC: Provider, No Known; MD Marguerite Gaines Minh Loc, MD

## 2020-10-13 NOTE — ED NOTES
Pt does report concerns over not able to urinate independently after last hip surgery.      Rtea De Leon, RN  10/13/20 1122

## 2020-10-13 NOTE — ED TRIAGE NOTES
Pt was working w a horse this am and was kicked down.  She may have dislocated her hip - ems thinks it popped back in when they moved her to a stretcher.  C/o right hip and right shoulder pain.  Did not hit head    Patient was placed in face mask during first look triage.  Patient was wearing a face mask throughout encounter.  I wore personal protective equipment throughout the encounter.  Hand hygiene was performed before and after patient encounter.

## 2020-10-13 NOTE — ED NOTES
Patient was placed in face mask in first look. Patient was wearing facemask when I entered the room and throughout our encounter. I wore full protective equipment throughout this patient encounter including a face mask, eye shield, gown and gloves. Hand hygiene was performed before donning protective equipment and after removal when leaving the room.       Reta De Leon RN  10/13/20 1124

## 2020-10-13 NOTE — ED NOTES
Pt to ED from home s/p working with horse this am, states horse backed into her fell and injured R hip, pain present. RLE is externally rotated, + distal pulses present      Reta De Leon, RN  10/13/20 1009       Reta De Leon, RN  10/13/20 1121

## 2020-10-14 PROBLEM — G47.00 INSOMNIA: Status: ACTIVE | Noted: 2020-10-14

## 2020-10-14 PROBLEM — R33.8 ACUTE URINARY RETENTION: Status: ACTIVE | Noted: 2020-10-14

## 2020-10-14 PROBLEM — D62 ACUTE BLOOD LOSS ANEMIA: Status: ACTIVE | Noted: 2020-10-14

## 2020-10-14 LAB
ANION GAP SERPL CALCULATED.3IONS-SCNC: 12 MMOL/L (ref 5–15)
BUN SERPL-MCNC: 17 MG/DL (ref 8–23)
BUN/CREAT SERPL: 23.3 (ref 7–25)
CALCIUM SPEC-SCNC: 8.7 MG/DL (ref 8.6–10.5)
CHLORIDE SERPL-SCNC: 102 MMOL/L (ref 98–107)
CO2 SERPL-SCNC: 21 MMOL/L (ref 22–29)
CREAT SERPL-MCNC: 0.73 MG/DL (ref 0.57–1)
DEPRECATED RDW RBC AUTO: 44.3 FL (ref 37–54)
ERYTHROCYTE [DISTWIDTH] IN BLOOD BY AUTOMATED COUNT: 12.5 % (ref 12.3–15.4)
GFR SERPL CREATININE-BSD FRML MDRD: 76 ML/MIN/1.73
GLUCOSE SERPL-MCNC: 161 MG/DL (ref 65–99)
HBA1C MFR BLD: 5.43 % (ref 4.8–5.6)
HCT VFR BLD AUTO: 30.2 % (ref 34–46.6)
HCT VFR BLD AUTO: 31.3 % (ref 34–46.6)
HGB BLD-MCNC: 10.4 G/DL (ref 12–15.9)
HGB BLD-MCNC: 10.6 G/DL (ref 12–15.9)
MCH RBC QN AUTO: 33.4 PG (ref 26.6–33)
MCHC RBC AUTO-ENTMCNC: 34.4 G/DL (ref 31.5–35.7)
MCV RBC AUTO: 97.1 FL (ref 79–97)
PLATELET # BLD AUTO: 223 10*3/MM3 (ref 140–450)
PMV BLD AUTO: 9.7 FL (ref 6–12)
POTASSIUM SERPL-SCNC: 4 MMOL/L (ref 3.5–5.2)
RBC # BLD AUTO: 3.11 10*6/MM3 (ref 3.77–5.28)
SODIUM SERPL-SCNC: 135 MMOL/L (ref 136–145)
WBC # BLD AUTO: 7.65 10*3/MM3 (ref 3.4–10.8)

## 2020-10-14 PROCEDURE — 97162 PT EVAL MOD COMPLEX 30 MIN: CPT

## 2020-10-14 PROCEDURE — 97530 THERAPEUTIC ACTIVITIES: CPT

## 2020-10-14 PROCEDURE — 25010000002 ENOXAPARIN PER 10 MG: Performed by: ORTHOPAEDIC SURGERY

## 2020-10-14 PROCEDURE — 97116 GAIT TRAINING THERAPY: CPT

## 2020-10-14 PROCEDURE — 85018 HEMOGLOBIN: CPT | Performed by: NURSE PRACTITIONER

## 2020-10-14 PROCEDURE — 25010000002 ONDANSETRON PER 1 MG: Performed by: ORTHOPAEDIC SURGERY

## 2020-10-14 PROCEDURE — 25010000003 CEFAZOLIN IN DEXTROSE 2-4 GM/100ML-% SOLUTION: Performed by: ORTHOPAEDIC SURGERY

## 2020-10-14 PROCEDURE — 80048 BASIC METABOLIC PNL TOTAL CA: CPT | Performed by: ORTHOPAEDIC SURGERY

## 2020-10-14 PROCEDURE — 36415 COLL VENOUS BLD VENIPUNCTURE: CPT | Performed by: ORTHOPAEDIC SURGERY

## 2020-10-14 PROCEDURE — 85014 HEMATOCRIT: CPT | Performed by: NURSE PRACTITIONER

## 2020-10-14 PROCEDURE — 83036 HEMOGLOBIN GLYCOSYLATED A1C: CPT | Performed by: NURSE PRACTITIONER

## 2020-10-14 PROCEDURE — 85027 COMPLETE CBC AUTOMATED: CPT | Performed by: ORTHOPAEDIC SURGERY

## 2020-10-14 RX ADMIN — Medication 100 MG: at 10:04

## 2020-10-14 RX ADMIN — DOCUSATE SODIUM 100 MG: 100 CAPSULE ORAL at 10:02

## 2020-10-14 RX ADMIN — SODIUM CHLORIDE, PRESERVATIVE FREE 10 ML: 5 INJECTION INTRAVENOUS at 21:00

## 2020-10-14 RX ADMIN — Medication 1 TABLET: at 10:04

## 2020-10-14 RX ADMIN — ENOXAPARIN SODIUM 40 MG: 40 INJECTION SUBCUTANEOUS at 10:02

## 2020-10-14 RX ADMIN — CEFAZOLIN SODIUM 2 G: 2 INJECTION, SOLUTION INTRAVENOUS at 01:15

## 2020-10-14 RX ADMIN — ONDANSETRON 4 MG: 2 INJECTION INTRAMUSCULAR; INTRAVENOUS at 04:29

## 2020-10-14 RX ADMIN — CEFAZOLIN SODIUM 2 G: 2 INJECTION, SOLUTION INTRAVENOUS at 10:01

## 2020-10-14 RX ADMIN — HYDROCODONE BITARTRATE AND ACETAMINOPHEN 1 TABLET: 7.5; 325 TABLET ORAL at 10:02

## 2020-10-14 RX ADMIN — HYDROCODONE BITARTRATE AND ACETAMINOPHEN 1 TABLET: 7.5; 325 TABLET ORAL at 15:15

## 2020-10-14 RX ADMIN — FOLIC ACID 1 MG: 1 TABLET ORAL at 10:04

## 2020-10-14 RX ADMIN — HYDROCODONE BITARTRATE AND ACETAMINOPHEN 1 TABLET: 7.5; 325 TABLET ORAL at 19:55

## 2020-10-14 RX ADMIN — SODIUM CHLORIDE, PRESERVATIVE FREE 10 ML: 5 INJECTION INTRAVENOUS at 10:04

## 2020-10-14 NOTE — DISCHARGE PLACEMENT REQUEST
"IrvinChalomalinda DICK (85 y.o. Female)     Date of Birth Social Security Number Address Home Phone MRN    1935  [address] [address]  [place] [id number] [phone] [id number]    M[employer]onism Marital Status          None        Admission Date Admission Type Admitting Provider Attending Provider Department, Room/Bed    10/13/20 Emergency Blaise Everett MD Nguyen, Minh Loc, MD [address], P888/1    Discharge Date Discharge Disposition Discharge Destination                       Attending Provider: Blaise Everett MD    Allergies: No Known Allergies    Isolation: None   Infection: None   Code Status: No CPR    Ht: 167.6 cm (66\")   Wt: 70.3 kg (155 lb)    Admission Cmt: None   Principal Problem: None                Active Insurance as of 10/13/2020     Primary Coverage     Payor Plan Insurance Group Employer/Plan Group    ANTHEM MEDICARE REPLACEMENT ANTHEM MEDICARE ADVANTAGE [id number]     Payor Plan Address Payor Plan Phone Number Payor Plan Fax Number Effective Dates    [address] [phone]  1/1/2017 - None Entered    [facility] [id number]       Subscriber Name Subscriber Birth Date Member ID       CARLA IRVIN 1935 [id number]                 Emergency Contacts      (Rel.) Home Phone Work Phone Mobile Phone    Gurmeet Irvin (Spouse) [phone] -- --              "

## 2020-10-14 NOTE — PROGRESS NOTES
Discharge Planning Assessment  Psychiatric     Patient Name: Carla Campos  MRN: [id number]  Today's Date: 10/14/2020    Admit Date: 10/13/2020    Discharge Needs Assessment     Row Name 10/14/20 1550       Living Environment    Lives With  alone    Current Living Arrangements  home/apartment/condo    Primary Care Provided by  self    Provides Primary Care For  no one    Family Caregiver if Needed  spouse    Quality of Family Relationships  helpful;involved;supportive    Able to Return to Prior Arrangements  yes       Resource/Environmental Concerns    Transportation Concerns  car, none       Transition Planning    Patient/Family Anticipates Transition to  home with family    Patient/Family Anticipated Services at Transition      Transportation Anticipated  family or friend will provide       Discharge Needs Assessment    Readmission Within the Last 30 Days  no previous admission in last 30 days    Equipment Currently Used at Home  none    Discharge Facility/Level of Care Needs  home with home health        Discharge Plan     Row Name 10/14/20 1455       Plan    Plan  Home with family support & [facility]    Patient/Family in Agreement with Plan  yes    Plan Comments  Spoke with patient, verified current information, and CCP role explained. Patient states she has family support at home. She's IADL, and has no history with DME/RH. She has worked with [facility] in the past. Patient plans to d/c home with family support & home health. She requests a referral for [facility]. Referral placed in [facility]; spoke with Adrianna/[facility] who has accepted the patient and will follow. Plan is for patient to d/c home with family support, [facility], and her friend/Mireille will transport her home by car at d/c. CCP following.        Continued Care and Services - Admitted Since 10/13/2020     Home Medical Care Coordination complete    Service Provider Request Status Selected Services Address Phone Fax     [facility] [address] [address] [phone]                Demographic Summary     Row Name 10/14/20 1534       General Information    Admission Type  inpatient    Reason for Consult  discharge planning    Preferred Language  English     Used During This Interaction  no       Contact Information    Permission Granted to Share Info With  ;family/designee        Functional Status     Row Name 10/14/20 1542       Functional Status    Usual Activity Tolerance  good    Current Activity Tolerance  good       Functional Status, IADL    Medications  independent    Meal Preparation  independent    Housekeeping  independent    Laundry  independent    Shopping  independent       Mental Status Summary    Recent Changes in Mental Status/Cognitive Functioning  --        Psychosocial    No documentation.       Abuse/Neglect    No documentation.       Legal    No documentation.       Substance Abuse    No documentation.       Patient Forms    No documentation.           Mireille Costello RN

## 2020-10-14 NOTE — PLAN OF CARE
Goal Outcome Evaluation:  Plan of Care Reviewed With: patient  Progress: improving  Outcome Summary: vss, dsg c/d/i, neurovascular status wnl, unable to amb, max asst of 3 people to get to bedside commode, voiding sm amts, will monitor, reports adequate pain control, ciwa negative, seizure precautions in place and no seizure activity this shift,discharge plans pending,  will cont to monitor

## 2020-10-14 NOTE — THERAPY EVALUATION
Patient Name: Carla Campos  : 1935    MRN: [id number]                              Today's Date: 10/14/2020       Admit Date: 10/13/2020    Visit Dx:     ICD-10-CM ICD-9-CM   1. Closed intertrochanteric fracture of hip, right, initial encounter (CMS/[facility])  S72.141A 820.21   2. Fall, initial encounter  W19.XXXA E888.9   3. Contusion of right shoulder, initial encounter  S40.011A 923.00     Patient Active Problem List   Diagnosis   • Closed left hip fracture (CMS/[facility])   • Closed intertrochanteric fracture of hip, right, initial encounter (CMS/[facility])   • Inguinal hernia   • Fall   • Hyperglycemia   • Chronic alcohol use     History reviewed. No pertinent past medical history.  Past Surgical History:   Procedure Laterality Date   • BLADDER REPAIR     • COLONOSCOPY     • FEMUR IM NAILING/RODDING Right 10/13/2020    Procedure: RT. GAMMA NAIL;  Surgeon: Zabrina Andrews MD;  Location: [facility];  Service: Orthopedics;  Laterality: Right;   • HYSTERECTOMY     • MI OPEN FIX INTER/SUBTROCH FX,IMPLNT Left 2017    Procedure: LEFT FEMUR INTRAMEDULLARY NAILING/RODDING;  Surgeon: Zabrina Andrews MD;  Location: [facility];  Service: Orthopedics   • TUBAL ABDOMINAL LIGATION Bilateral     age mid twenties     General Information     Row Name 10/14/20 1137          Physical Therapy Time and Intention    Document Type  evaluation  -AE     Mode of Treatment  individual therapy;physical therapy  -AE     Row Name 10/14/20 1137          General Information    Patient Profile Reviewed  yes  -AE     Prior Level of Function  independent:  -AE     Existing Precautions/Restrictions  hip;fall  -AE     Row Name 10/14/20 1137          Living Environment    Lives With  spouse  -AE     Row Name 10/14/20 1137          Home Main Entrance    Number of Stairs, Main Entrance  two  -AE     Stair Railings, Main Entrance  none  -AE     Row Name 10/14/20 1137          Stairs Within Home, Primary    Number of Stairs,  Within Home, Primary  none  -AE     Row Name 10/14/20 1137          Cognition    Orientation Status (Cognition)  oriented x 4  -AE     Row Name 10/14/20 1137          Safety Issues, Functional Mobility    Safety Issues Affecting Function (Mobility)  at risk behavior observed;awareness of need for assistance;insight into deficits/self-awareness  -AE     Impairments Affecting Function (Mobility)  balance;motor control;pain;range of motion (ROM);strength  -AE       User Key  (r) = Recorded By, (t) = Taken By, (c) = Cosigned By    Initials Name Provider Type    AE Shabnam Denis PT Physical Therapist        Mobility     Row Name 10/14/20 1138          Bed Mobility    Bed Mobility  bed mobility (all) activities  -AE     All Activities, [place] (Bed Mobility)  minimum assist (75% patient effort);1 person assist  -AE     Assistive Device (Bed Mobility)  head of bed elevated;bed rails  -AE     Row Name 10/14/20 1138          Transfers    Comment (Transfers)  CGA/Abad for all transfers with FWW  -AE     Row Name 10/14/20 1138          Bed-Chair Transfer    Bed-Chair [place] (Transfers)  contact guard;minimum assist (75% patient effort);1 person assist  -AE     Assistive Device (Bed-Chair Transfers)  walker, front-wheeled  -AE     Row Name 10/14/20 1138          Sit-Stand Transfer    Sit-Stand [place] (Transfers)  contact guard;minimum assist (75% patient effort);1 person assist  -AE     Assistive Device (Sit-Stand Transfers)  walker, front-wheeled  -AE     Row Name 10/14/20 1138          Gait/Stairs (Locomotion)    [place] Level (Gait)  contact guard;minimum assist (75% patient effort);1 person assist  -AE     Assistive Device (Gait)  walker, front-wheeled  -AE     Distance in Feet (Gait)  7-8 steps during stand pivot transfer; difficulty advancing RLE due to pain/weakness and difficulty weight shifting  -AE     Deviations/Abnormal Patterns (Gait)  antalgic  -AE     Bilateral Gait Deviations  forward  flexed posture;heel strike decreased  -AE     Right Sided Gait Deviations  leans left;weight shift ability decreased  -AE       User Key  (r) = Recorded By, (t) = Taken By, (c) = Cosigned By    Initials Name Provider Type    AE Shabnam Denis PT Physical Therapist        Obj/Interventions     Row Name 10/14/20 1142          Range of Motion Comprehensive    Comment, General Range of Motion  RLE hip AROM limited <50%available range 2/2 pain and weakness, LLE WFL  -AE     Row Name 10/14/20 1142          Strength Comprehensive (MMT)    Comment, General Manual Muscle Testing (MMT) Assessment  generalized weakness  -AE     Row Name 10/14/20 1142          Motor Skills    Therapeutic Exercise  hip  -AE     Row Name 10/14/20 1142          Hip (Therapeutic Exercise)    Hip (Therapeutic Exercise)  AROM (active range of motion);AAROM (active assistive range of motion)  -AE     Hip AROM (Therapeutic Exercise)  right;flexion;aBduction;aDduction  -AE       User Key  (r) = Recorded By, (t) = Taken By, (c) = Cosigned By    Initials Name Provider Type    AE Shabnam Denis PT Physical Therapist        Goals/Plan     Row Name 10/14/20 1143          Transfer Goal 1 (PT)    Activity/Assistive Device (Transfer Goal 1, PT)  transfers, all  -AE     [place] Level/Cues Needed (Transfer Goal 1, PT)  supervision required  -AE     Time Frame (Transfer Goal 1, PT)  5 days  -AE     Progress/Outcome (Transfer Goal 1, PT)  continuing progress toward goal  -AE     Row Name 10/14/20 1143          Gait Training Goal 1 (PT)    Activity/Assistive Device (Gait Training Goal 1, PT)  gait (walking locomotion);assistive device use  -AE     [place] Level (Gait Training Goal 1, PT)  contact guard assist  -AE     Distance (Gait Training Goal 1, PT)  10ft  -AE     Time Frame (Gait Training Goal 1, PT)  1 week  -AE     Progress/Outcome (Gait Training Goal 1, PT)  continuing progress toward goal  -AE       User Key  (r) = Recorded By, (t) = Taken  By, (c) = Cosigned By    Initials Name Provider Type    Shabnam Melendez, LO Physical Therapist        Clinical Impression     Row Name 10/14/20 1143          Pain    Additional Documentation  Pain Scale: Numbers Pre/Post-Treatment (Group)  -AE     Row Name 10/14/20 1143          Pain Scale: Numbers Pre/Post-Treatment    Pretreatment Pain Rating  1/10  -AE     Posttreatment Pain Rating  4/10  -AE     Pain Location - Side  Right  -AE     Pain Location - Orientation  proximal  -AE     Pain Location  hip  -AE     Pain Intervention(s)  Repositioned;Ambulation/increased activity;Rest  -AE     Row Name 10/14/20 1143          Plan of Care Review    Plan of Care Reviewed With  patient  -AE     Row Name 10/14/20 1143          Therapy Assessment/Plan (PT)    Patient/Family Therapy Goals Statement (PT)  Pt plans to DC home tomorrow  -AE     Rehab Potential (PT)  good, to achieve stated therapy goals  -AE     Criteria for Skilled Interventions Met (PT)  yes;meets criteria  -AE     Row Name 10/14/20 1143          Positioning and Restraints    Pre-Treatment Position  in bed  -AE     Post Treatment Position  chair  -AE     In Chair  reclined;call light within reach;encouraged to call for assist;exit alarm on  -AE       User Key  (r) = Recorded By, (t) = Taken By, (c) = Cosigned By    Initials Name Provider Type    Shabnam Melendez PT Physical Therapist        Outcome Measures     Row Name 10/14/20 1140          How much help from another person do you currently need...    Turning from your back to your side while in flat bed without using bedrails?  3  -AE     Moving from lying on back to sitting on the side of a flat bed without bedrails?  2  -AE     Moving to and from a bed to a chair (including a wheelchair)?  3  -AE     Standing up from a chair using your arms (e.g., wheelchair, bedside chair)?  3  -AE     Climbing 3-5 steps with a railing?  2  -AE     To walk in hospital room?  2  -AE     AM-PAC 6 Clicks Score (PT)   15  -AE     Row Name 10/14/20 1144          Functional Assessment    Outcome Measure Options  AM-PAC 6 Clicks Basic Mobility (PT)  -AE       User Key  (r) = Recorded By, (t) = Taken By, (c) = Cosigned By    Initials Name Provider Type    AE Shabnam Denis PT Physical Therapist        Physical Therapy Education                 Title: PT OT SLP Therapies (Done)     Topic: Physical Therapy (Done)     Point: Mobility training (Done)     Learning Progress Summary           Patient Acceptance, E,TB, VU,DU by AE at 10/14/2020 1144                   Point: Home exercise program (Done)     Learning Progress Summary           Patient Acceptance, E,TB, VU,DU by AE at 10/14/2020 1144                   Point: Body mechanics (Done)     Learning Progress Summary           Patient Acceptance, E,TB, VU,DU by AE at 10/14/2020 1144                   Point: Precautions (Done)     Learning Progress Summary           Patient Acceptance, E,TB, VU,DU by AE at 10/14/2020 1144                               User Key     Initials Effective Dates Name Provider Type Discipline    AE 09/04/19 -  Shabnam Denis PT Physical Therapist PT              PT Recommendation and Plan  Planned Therapy Interventions (PT): balance training, bed mobility training, gait training, home exercise program, motor coordination training, neuromuscular re-education, patient/family education, postural re-education, ROM (range of motion), stair training, strengthening, stretching, transfer training  Plan of Care Reviewed With: patient  Outcome Summary: Pt is an 84 yo F POD1 R hip open reduction and internal fixation 2/2 intertrochanter fracture of femur. Pt lives with  in [facility] with 2 small steps to enter. States she owns all necessary DME including FWW, BSC, Shower chair, transport chair, and other adaptive equipment. Pt is very slow to perform all tasks however is able to perform them with CGA/Abad with use of adaptive equipment. Pt performed supine->sit  with use of gait belt around RLE with Abad, performed sit<>Stand transfer with CGA and stand pivot transfer with Abad. Pt had significant difficulty advancing RLE due to pain/weakness, able to scoot to advance RLE once small pillow case placed underneath foot to reduce friction. Skilled PT needed to address above impairments. DC recs include SNU/[facility] however patient adamanet to return home with spouse  and HHPT to follow. Pt safe to do so but would certainly benefit from additional rehab.     Time Calculation:   PT Charges     Row Name 10/14/20 1153 10/14/20 1112          Time Calculation    Start Time  1030  -AE  1030  -AE     Stop Time  1100  -AE  1100  -AE     Time Calculation (min)  30 min  -AE  30 min  -AE     PT Received On  --  10/14/20  -AE     PT - Next Appointment  --  10/15/20  -AE     PT Goal Re-Cert Due Date  --  10/21/20  -AE        Time Calculation- PT    Total Timed Code Minutes- PT  25 minute(s)  -AE  25 minute(s)  -AE       User Key  (r) = Recorded By, (t) = Taken By, (c) = Cosigned By    Initials Name Provider Type    AE Shabnam Denis, LO Physical Therapist        Therapy Charges for Today     Code Description Service Date Service Provider Modifiers Qty    12100651425 HC PT EVAL MOD COMPLEXITY 2 10/14/2020 Shabnam Denis, PT GP 1    03566425822 HC GAIT TRAINING EA 15 MIN 10/14/2020 Shabnam Denis, PT GP 1    22012244161 HC PT THERAPEUTIC ACT EA 15 MIN 10/14/2020 Shabnam Denis, PT GP 1          PT G-Codes  Outcome Measure Options: AM-PAC 6 Clicks Basic Mobility (PT)  AM-PAC 6 Clicks Score (PT): 15    Shabnam Denis PT  10/14/2020

## 2020-10-14 NOTE — PROGRESS NOTES
Progress Note    Name: Carla Campos ADMIT: 10/13/2020   : 1935  PCP: Provider, No Known    MRN: [id number] LOS: 1 days   AGE/SEX: 85 y.o. female  ROOM: P888/1   Date of Encounter Visit: 10/14/20    Subjective:     Interval History: feels better with improved movements of right leg. Urinary retention after smith removed. Was able to pee 200 on her own, but required straight cath with 1000 ml out.     REVIEW OF SYSTEMS:   no fever or chills.  No chest pain, palpitations. Right hip edema.   No shortness of breath, cough or sputum.   No nausea, vomiting or diarrhea. Passing flatus.     Objective:   Temp:  [96.8 °F (36 °C)-98.5 °F (36.9 °C)] 97.3 °F (36.3 °C)  Heart Rate:  [63-99] 99  Resp:  [14-18] 18  BP: (107-133)/(65-85) 117/77   SpO2:  [92 %-100 %] 97 %  on  Flow (L/min):  [2-4] 2 Device (Oxygen Therapy): room air    Intake/Output Summary (Last 24 hours) at 10/14/2020 1248  Last data filed at 10/14/2020 0936  Gross per 24 hour   Intake 1140 ml   Output 1250 ml   Net -110 ml     Body mass index is 25.02 kg/m².      10/13/20  1124 10/13/20  1353   Weight: 70.3 kg (155 lb) 70.3 kg (155 lb)     Weight change:     Physical Exam  Constitutional:       General: She is not in acute distress.     Appearance: She is not diaphoretic.   HENT:      Head: Atraumatic.      Nose: Nose normal.   Eyes:      Conjunctiva/sclera: Conjunctivae normal.   Cardiovascular:      Rate and Rhythm: Normal rate and regular rhythm.      Heart sounds: No murmur.   Pulmonary:      Effort: Pulmonary effort is normal.      Breath sounds: No wheezing or rales.   Abdominal:      General: Bowel sounds are normal.      Palpations: Abdomen is soft.      Tenderness: There is no abdominal tenderness.      Hernia: A hernia (R inguinal and reducible without tenderness) is present.   Musculoskeletal:         General: Swelling (1-2+ right hip) and tenderness (R hip) present.   Skin:     General: Skin is warm and dry.   Neurological:      Mental  Status: She is alert.           Results Review:      Results from last 7 days   Lab Units 10/14/20  0356 10/13/20  1013   SODIUM mmol/L 135* 138   POTASSIUM mmol/L 4.0 4.1   CHLORIDE mmol/L 102 105   CO2 mmol/L 21.0* 23.0   BUN mg/dL 17 22   CREATININE mg/dL 0.73 0.75   GLUCOSE mg/dL 161* 108*   CALCIUM mg/dL 8.7 9.3   AST (SGOT) U/L  --  20   ALT (SGPT) U/L  --  16     Estimated Creatinine Clearance: 59.1 mL/min (by C-G formula based on SCr of 0.73 mg/dL).                            Invalid input(s):  PHOS        Invalid input(s): LDLCALC  Results from last 7 days   Lab Units 10/14/20  0356 10/13/20  1013   WBC 10*3/mm3 7.65 6.84   HEMOGLOBIN g/dL 10.4* 12.9   HEMATOCRIT % 30.2* 38.9   PLATELETS 10*3/mm3 223 272   MCV fL 97.1* 98.5*   MCH pg 33.4* 32.7   MCHC g/dL 34.4 33.2   RDW % 12.5 12.6   RDW-SD fl 44.3 45.8   MPV fL 9.7 9.4   NEUTROPHIL % %  --  71.8   LYMPHOCYTE % %  --  17.5*   MONOCYTES % %  --  6.6   EOSINOPHIL % %  --  2.3   BASOPHIL % %  --  1.5   IMM GRAN % %  --  0.3   NEUTROS ABS 10*3/mm3  --  4.91   LYMPHS ABS 10*3/mm3  --  1.20   MONOS ABS 10*3/mm3  --  0.45   EOS ABS 10*3/mm3  --  0.16   BASOS ABS 10*3/mm3  --  0.10   IMMATURE GRANS (ABS) 10*3/mm3  --  0.02   NRBC /100 WBC  --  0.0                             Results from last 7 days   Lab Units 10/13/20  1117   ADENOVIRUS DETECTION BY PCR  Not Detected   CORONAVIRUS 229E  Not Detected   CORONAVIRUS HKU1  Not Detected   CORONAVIRUS NL63  Not Detected   CORONAVIRUS OC43  Not Detected   HUMAN METAPNEUMOVIRUS  Not Detected   HUMAN RHINOVIRUS/ENTEROVIRUS  Not Detected   INFLUENZA B PCR  Not Detected   PARAINFLUENZA 1  Not Detected   PARAINFLUENZA VIRUS 2  Not Detected   PARAINFLUENZA VIRUS 3  Not Detected   PARAINFLUENZA VIRUS 4  Not Detected   BORDETELLA PERTUSSIS PCR  Not Detected   CHLAMYDOPHILA PNEUMONIAE PCR  Not Detected   MYCOPLAMA PNEUMO PCR  Not Detected   INFLUENZA A PCR  Not Detected   INFLUENZA A H3  Not Detected   INFLUENZA A H1  Not  Detected   RSV, PCR  Not Detected               Imaging:  Imaging Results (Last 24 Hours)     Procedure Component Value Units Date/Time    XR Hip With or Without Pelvis 2 - 3 View Right [[id number]] Collected: 10/13/20 1809     Updated: 10/13/20 1956    Narrative:      TWO-VIEW PORTABLE RIGHT HIP IN OR     HISTORY: Internal fixation of fracture.      FINDINGS: Imaging in the operating room was performed at the time of  internal fixation of an intertrochanteric fracture with intramedullary  carmen and intersecting screws and the alignment appears satisfactory. Six  images were obtained and the fluoroscopy time measures 1 minute.     This report was finalized on 10/13/2020 7:53 PM by Dr. Rogelio Minaya M.D.       XR Hip With or Without Pelvis 1 View Right [[id number]] Collected: 10/13/20 1835     Updated: 10/13/20 1839    Narrative:      PORTABLE JOINT X-RAY     HISTORY: Right hip fracture repair.     Portable x-ray of the pelvis and right hip is provided.     FINDINGS:  There is right hip gamma nail hardware, positioned as  expected. Major proximal femur fracture fragments have been reduced to  near-anatomic position. No periprosthetic fracture is identified.  There  are expected post operative changes in the soft tissues.       Impression:      Postoperative change from right proximal femur fracture  repair..     This report was finalized on 10/13/2020 6:36 PM by Dr. Ian Doss M.D.       FL C Arm During Surgery [[id number]] Resulted: 10/13/20 1751     Updated: 10/13/20 1751    Narrative:      This procedure was auto-finalized with no dictation required.          I reviewed the patient's new clinical results and medications.         Medication Review:   Medications reviewed. See electronic record    Problem List:     Active Hospital Problems    Diagnosis  POA   • Acute blood loss anemia [D62]  Unknown   • Insomnia [G47.00]  Unknown   • Closed intertrochanteric fracture of hip, right, initial encounter (CMS/[facility])  [S72.141A]  Yes   • Inguinal hernia [K40.90]  Unknown   • Fall [W19.XXXA]  Unknown   • Hyperglycemia [R73.9]  Unknown   • Chronic alcohol use [Z72.89]  Unknown      Resolved Hospital Problems   No resolved problems to display.       Assessment and Plan:       Closed intertrochanteric fracture of hip, right, initial encounter   -appreciate orthopedics assistance. s/p R gamma nailing on 10/13/20.   -PRN pain management and bowel regimen  -encouraged pulmonary hygiene measures  -PT to eval. Has all equipment needed at home and declined subacute rehab.   -lovenox 40 and defer VTE prophylaxis long term plan to orthopedics    Acute urinary retention  -had similar issues with prior surgery. Hopefully will improve as anesthesia wears off and ambulation efforts improve  -continue bladder scan and intermittent catheterizations. Hold on flomax given borderline low BP.        Inguinal hernia, right  -Reducible and nontender. Small amount of bowel noted in hernia on CT without incarceration. Avoid heavy lifting and consider general surgery evaluation if has any further issues as outpatient.     Blood loss anemia  -Mild macrocytosis. Hgb down to 9.5 today likely from acute blood loss from surgery.   -continue vitamin replacement and monitor labs       Hyperglycemia  -Mild and unsure if fasting. Denies any history of diabetes.  -glucose 135 on labs this am. Will check A1c and monitor glucose BID       Chronic alcohol use  -Denies any prior or current withdrawal symptoms.  -CIWA protocol and vitamin replacement     Chronic insomnia  -likely from chronic alcohol use. Continue melatonin.     VTE prophylaxis: SCDs  CODE status: full code  Disposition: TBd- possible dc home with HH tomorrow    I discussed the patients findings and my recommendations with patient.    I wore full protective equipment throughout this patient encounter including a face mask and eye shield. Hand hygiene was performed upon entering patients room and when  leaving the room.      Rosa Maria Streeter, MARCELO  10/14/20        ]

## 2020-10-14 NOTE — PLAN OF CARE
Problem: Adult Inpatient Plan of Care  Goal: Plan of Care Review  Outcome: Ongoing, Progressing  Flowsheets  Taken 10/14/2020 1140  Outcome Summary: Pt is an 86 yo F POD1 R hip open reduction and internal fixation 2/2 intertrochanter fracture of femur. Current activity restrictions include WBAT with walker for transfers and gait <25ft, ROM as tolerated. Pt lives with  in [facility] with 2 small steps to enter. States she owns all necessary DME including FWW, BSC, Shower chair, transport chair, and other adaptive equipment. Pt is very slow to perform all tasks however is able to perform them with CGA/Abad with use of adaptive equipment. Pt performed supine->sit with use of gait belt around RLE with Abad, performed sit<>Stand transfer with CGA and stand pivot transfer with Abad. Pt had significant difficulty advancing RLE due to pain/weakness, able to scoot to advance RLE once small pillow case placed underneath foot to reduce friction. Skilled PT needed to address above impairments. DC recs include SN[facility] however patient adamanet to return home with spouse  and HHPT to follow. Pt safe to do so but would certainly benefit from additional rehab.  Taken 10/14/2020 1143  Plan of Care Reviewed With: patient

## 2020-10-15 VITALS
SYSTOLIC BLOOD PRESSURE: 105 MMHG | BODY MASS INDEX: 24.91 KG/M2 | DIASTOLIC BLOOD PRESSURE: 65 MMHG | OXYGEN SATURATION: 94 % | HEART RATE: 91 BPM | TEMPERATURE: 97.5 F | RESPIRATION RATE: 16 BRPM | HEIGHT: 66 IN | WEIGHT: 155 LBS

## 2020-10-15 LAB
ANION GAP SERPL CALCULATED.3IONS-SCNC: 6.6 MMOL/L (ref 5–15)
BUN SERPL-MCNC: 18 MG/DL (ref 8–23)
BUN/CREAT SERPL: 27.7 (ref 7–25)
CALCIUM SPEC-SCNC: 8.4 MG/DL (ref 8.6–10.5)
CHLORIDE SERPL-SCNC: 104 MMOL/L (ref 98–107)
CO2 SERPL-SCNC: 23.4 MMOL/L (ref 22–29)
CREAT SERPL-MCNC: 0.65 MG/DL (ref 0.57–1)
GFR SERPL CREATININE-BSD FRML MDRD: 87 ML/MIN/1.73
GLUCOSE SERPL-MCNC: 122 MG/DL (ref 65–99)
POTASSIUM SERPL-SCNC: 4.1 MMOL/L (ref 3.5–5.2)
SODIUM SERPL-SCNC: 134 MMOL/L (ref 136–145)

## 2020-10-15 PROCEDURE — 80048 BASIC METABOLIC PNL TOTAL CA: CPT | Performed by: NURSE PRACTITIONER

## 2020-10-15 PROCEDURE — 25010000002 ENOXAPARIN PER 10 MG: Performed by: ORTHOPAEDIC SURGERY

## 2020-10-15 RX ORDER — PSEUDOEPHEDRINE HCL 30 MG
100 TABLET ORAL 2 TIMES DAILY
Status: ON HOLD
Start: 2020-10-15 | End: 2020-12-02

## 2020-10-15 RX ORDER — ACETAMINOPHEN 325 MG/1
650 TABLET ORAL EVERY 4 HOURS PRN
Status: ON HOLD
Start: 2020-10-15 | End: 2020-12-02

## 2020-10-15 RX ORDER — HYDROCODONE BITARTRATE AND ACETAMINOPHEN 7.5; 325 MG/1; MG/1
1 TABLET ORAL EVERY 4 HOURS PRN
Qty: 42 TABLET | Refills: 0 | Status: ON HOLD | OUTPATIENT
Start: 2020-10-15 | End: 2020-12-02

## 2020-10-15 RX ORDER — LANOLIN ALCOHOL/MO/W.PET/CERES
3 CREAM (GRAM) TOPICAL NIGHTLY PRN
Qty: 30 TABLET
Start: 2020-10-15

## 2020-10-15 RX ADMIN — ENOXAPARIN SODIUM 40 MG: 40 INJECTION SUBCUTANEOUS at 07:43

## 2020-10-15 RX ADMIN — FOLIC ACID 1 MG: 1 TABLET ORAL at 07:42

## 2020-10-15 RX ADMIN — HYDROCODONE BITARTRATE AND ACETAMINOPHEN 1 TABLET: 7.5; 325 TABLET ORAL at 16:18

## 2020-10-15 RX ADMIN — DOCUSATE SODIUM 100 MG: 100 CAPSULE ORAL at 07:42

## 2020-10-15 RX ADMIN — HYDROCODONE BITARTRATE AND ACETAMINOPHEN 1 TABLET: 7.5; 325 TABLET ORAL at 07:42

## 2020-10-15 RX ADMIN — Medication 100 MG: at 07:42

## 2020-10-15 RX ADMIN — Medication 1 TABLET: at 07:42

## 2020-10-15 RX ADMIN — HYDROCODONE BITARTRATE AND ACETAMINOPHEN 1 TABLET: 7.5; 325 TABLET ORAL at 01:27

## 2020-10-15 RX ADMIN — HYDROCODONE BITARTRATE AND ACETAMINOPHEN 1 TABLET: 7.5; 325 TABLET ORAL at 12:03

## 2020-10-15 NOTE — PROGRESS NOTES
Continued Stay Note  [facility]     Patient Name: Carla Campos  MRN: [id number]  Today's Date: 10/15/2020    Admit Date: 10/13/2020    Discharge Plan     Row Name 10/15/20 0947       Plan    Plan  Home with family support & [facility]    Patient/Family in Agreement with Plan  yes    Plan Comments  Spoke with the patient who plans to d/c home with [employer] , family support, and states her friend/Mireille will transport her home by car at d/c. Pt does request a list of personal care agencies to assist her  at home. CCP provided her that list at bedside, and pt states she will contact the agency of her choosing after d/c. Pt denies RH/DME and further d/c needs. No other needs identified. CCP following.    Final Discharge Disposition Code  06 - home with home health care    Final Note  Pt to d/c home with family support & [employer] .        Discharge Codes    No documentation.       Expected Discharge Date and Time     Expected Discharge Date Expected Discharge Time    Oct 15, 2020             Mireille Costello RN

## 2020-10-15 NOTE — PLAN OF CARE
Goal Outcome Evaluation:  Plan of Care Reviewed With: patient  Progress: improving  Outcome Summary: Pt is a post op day 2 of a rt gamma hip nailing. Dressings are clean dry and intact. Pt continues with the Optifoam dressings. Pt has been up to the bedside commode with an assist x 1-2. Voiding function intact. Pt does not have any significant medicl history. Pt is on CIWA precautions since she is a daily drinker. Latest CIWA was a 2, for trinity tremors noted when she got up. Pt has orders for AC BID, but pt has been refusing. Pt has orders for seizure precautions as per the Dr, side rails padded. Pt continues with PO pain meds that provide relief. Pt is resting at this time, will continue to monitor.

## 2020-10-15 NOTE — SIGNIFICANT NOTE
Pt states she is doing fine and her friend is on her way to pick her up from the hospital, denies acute PT needs. Safe for DC home with 24hr supervision and assist with HHPT to follow.

## 2020-10-15 NOTE — DISCHARGE INSTRUCTIONS
Discharge and Follow up Instructions:      Hip Fracture Discharge Instructions:    I. ACTIVITIES:  1. Exercises:  ? Complete exercise program as taught by the hospital physical therapist 2 times per day  ? Exercise program will be advanced by the physical therapist  ? During the day be up ambulating every 2 hours (while awake) for short distances  ? Complete the ankle pump exercises at least 10 times per hour (while awake)  ? Elevate legs when in bed and for at least 30 minutes during the day.Use cold packs 20-30 minutes approximately 5 times per day. This should be done before and after completing your exercises and at any time you are experiencing pain/ stiffness in your operative extremity.      2. Activities of Daily Living:  ? No tub baths, hot tubs, or swimming pools for 4 weeks  ? May shower and let water run over the incision on post-operative day #5 if no drainage. Do not scrub or rub the incision. Simply let the water run over the incision and pat dry.    II. Restrictions    ? Your surgeon will discuss with you when you will be able to drive again. Usual guidelines are you are to be off pain medications prior to driving.  ? Weight bearing is WBAT  ? First week stay inside on even terrain. May go up and down stairs one stair at a time utilizing the hand rail once cleared by physical therapy to do so.  ? After one week, you may venture outside (if cleared to do so by physical therapist).    III. Precautions:  ? Everyone that comes near you should wash their hands  ? No elective dental, genital-urinary, or colon procedures or surgical procedures for 12 weeks after surgery unless absolutely necessary.  ?  If dental work or surgical procedure is deemed absolutely necessary, you will need to contact your surgeon as you will need to take antibiotics 1 hour prior to any dental work (including teeth cleanings).  ? Please discuss with your surgeon prophylactic antibiotics as the length of time this intervention will  be necessary for you varies with each patient’s health history and situation.  ? Avoid sick people. If you must be around someone who is ill, they should wear a mask.  ? Avoid visits to the Emergency Room or Urgent Care. If you feel you need to go to the Emergency Room, please notify your Surgeon's office.  ? Stockings are to be worn for one week after surgery and are to be placed on in the morning and removed at night. Observe your skin when stocking is removed for any problems. Monitor the stockings to ensure that any swelling is not causing the stockings to become too tight. In this case, remove stockings immediately.      IV. INCISION CARE:  ? Wash your hands prior to dressing changes  ? Change the dressing as needed to keep incision clean and dry. Utilize dry gauze and paper tape. Avoid touching the side of the gauze that goes against the incision with your hands.  ? No creams or ointments to the incision  ? May remove dressing once the incision is free of drainage  ? Do not touch or pick at the incision  ? Check incision every day and notify surgeon immediately if any of the following signs or symptoms are noted:  o Increase in redness  o Increase in swelling around the incision and of the entire extremity  o Increase in pain  o Drainage oozing from the incision  o Pulling apart of the edges of the incision  o Increase in overall body temperature (greater than 100.5 degrees)  ? Your Staples will be removed between 10-14 days postoperation.  This may be done by either the home health nurse, rehabilitation nurse or during your return visit to Dr. Andrews's office.  You will then be instructed on how to care for the incision.  (Please call the office if your staples have not been removed within 14 days after surgery).    V. Medications:   1. Anticoagulants: You will be discharged on an anticoagulant. This is a prophylactic medication that helps prevent blood clots during your post-operative period.  You will be  on Lovenox  for 14 days.   ? While taking the anticoagulant, you should avoid taking any additional aspirin, ibuprofen (Advil or Motrin), Aleve (Naprosyn) or other non-steroidal anti-inflammatory medications.   ? Notify surgeon immediately if any shannan bleeding is noted in the urine, stool, emesis, or from the nose or the incision. Blood in the stool will often appear as black rather than red. Blood in urine may appear as pink. Blood in emesis may appear as brown/black like coffee grounds.  ? You will need to apply pressure for longer periods of time to any cuts or abrasions to stop bleeding  ? Avoid alcohol while taking anticoagulants    2. Stool Softeners: You will be at greater risk of constipation after surgery due to being less mobile and the pain medications.   ? Take stool softeners as instructed by your surgeon while on pain medications. Over the counter Colace 100 mg 1-2 capsules twice daily.   ? If stools become too loose or too frequent, please decreases the dosage or stop the stool softener.  ? If constipation occurs despite use of stool softeners, you are to continue the stool softeners and add a laxative (Milk of Magnesia 1 ounce daily as needed).  ? Dulcolax oral tabs or suppository, or a fleets enema can also be utilized for constipation and can be obtained over the counter.   ? If above interventions are unsuccessful in inducing bowel movements, please contact your surgeon's office / family physician's office.  ? Drink plenty of fluids, and eat fruits and vegetables during your recovery time    3. Pain Medications utilized after surgery are narcotics and the law requires that the following information be given to all patients that are prescribed narcotics:  ? CLASSIFICATION: Pain medications are called Opioids and are narcotics  ? LEGALITIES: It is illegal to share narcotics with others and to drive within 24 hours of taking narcotics  ? POTENTIAL SIDE EFFECTS: Potential side effects of opioids  include: nausea, vomiting, itching, dizziness, drowsiness, dry mouth, constipation, and difficulty urinating.  ? POTENTIAL ADVERSE EFFECTS:   o Opioid tolerance can develop with use of pain medications and this simply means that it requires more and more of the medication to control pain; however, this is seen more in patients that use opioids for longer periods of time.  o Opioid dependence can develop with use of Opioids and this simply means that to stop the medication can cause withdrawal symptoms; however, this is seen with patients that use Opioids for longer periods of time.  o Opioid addiction can develop with use of Opioids and the incidence of this is very unlikely in patients who take the medications as ordered and stop the medications as instructed.  o Opioid overdose can be dangerous, but is unlikely when the medication is taken as ordered and stopped when ordered. It is important not to mix opioids with alcohol or with and type of sedative such as Benadryl as this can lead to over sedation and respiratory difficulty.  ? DOSAGE:   o Pain medications will need to be taken consistently for the first week to decrease pain and promote adequate pain relief and participation in physical therapy.  o After the initial surgical pain begins to resolve, you may begin to decrease the pain medication. By the end of 6 weeks, you should be off of pain medications.  o Refills will not be given by the office during evening hours, on weekends, or after 6 weeks post-op.  o To seek refills on pain medications during the initial 6 week post-operative period, you must call the office 48 hours in advance to request the refill. The office will then notify you when to  the prescription. DO NOT wait until you are out of the medication to request a refill.    V. FOLLOW-UP VISITS:  ? You will need to follow up in the office with your surgeon on October 27, 2020. Please call this number [phone]  to schedule this  appointment.  If you have any concerns or suspected complications prior to your follow up visit, please call your surgeons office. Do not wait until your appointment time if you suspect complications. These will need to be addressed in the office promptly.

## 2020-10-15 NOTE — DISCHARGE SUMMARY
Discharge Summary    Patient Name: Carla Campos  Age/Sex: 85 y.o. female  : 1935  MRN: [id number]  Patient Care Team:  Provider, Chantal Known as PCP - General    Hospital Course     Date of Admit: 10/13/2020  Date of Discharge:  10/15/20   Discharge Condition: Stable    Discharge Diagnoses:    Closed intertrochanteric fracture of hip, right, initial encounter (CMS/[facility])    Inguinal hernia    Fall    Hyperglycemia    Chronic alcohol use    Acute blood loss anemia    Insomnia    Acute urinary retention    Present on Admission:  • Closed intertrochanteric fracture of hip, right, initial encounter (CMS/[facility])      Hospital Course:   Carla Campos presented to [facility] with complaints of right hip pain after sustaining a fall. Please see the admitting history and physical for further details. She was found to have Right intertrochanteric fracture and was admitted to the hospital for further evaluation and treatment. Orthopedic surgery was consulted and she underwent gamma nailing on 10/13/20.     Post operatively she had some urinary retention that has improved and is urinating on her own with no significant residual retention. CT had incidentally noted a right inguinal hernia with some small bowel without incarceration. She was encouraged to avoid constipation and consider general surgery evaluation if develops any pain. She had some mild post operative anemia with macrocytosis and was encouraged to take a multivitamin. Hyperglycemia improved and A1c was normal. She was ambulating with PT and has all supplies at home. She has done injections in the past and will continue lovenox for 14 days. Pain script was provided by orthopedics. Overall she is stable and ready for discharge home with home health and further PT WBAT to RLE.     Consults:   IP CONSULT TO ORTHOPEDIC SURGERY  IP CONSULT TO HOSPITALIST  IP CONSULT TO CASE MANAGEMENT   IP CONSULT TO CASE MANAGEMENT SOCIAL  SERVICES    Significant Discharge Diagnostics   Procedures Performed:  Procedure(s):  RT. GAMMA NAIL       Pertinent Lab Results:  Results from last 7 days   Lab Units 10/15/20  0351 10/14/20  0356 10/13/20  1013   SODIUM mmol/L 134* 135* 138   POTASSIUM mmol/L 4.1 4.0 4.1   CHLORIDE mmol/L 104 102 105   CO2 mmol/L 23.4 21.0* 23.0   BUN mg/dL 18 17 22   CREATININE mg/dL 0.65 0.73 0.75   GLUCOSE mg/dL 122* 161* 108*   CALCIUM mg/dL 8.4* 8.7 9.3   AST (SGOT) U/L  --   --  20   ALT (SGPT) U/L  --   --  16         Results from last 7 days   Lab Units 10/14/20  1319 10/14/20  0356 10/13/20  1013   WBC 10*3/mm3  --  7.65 6.84   HEMOGLOBIN g/dL 10.6* 10.4* 12.9   HEMATOCRIT % 31.3* 30.2* 38.9   PLATELETS 10*3/mm3  --  223 272   MCV fL  --  97.1* 98.5*   MCH pg  --  33.4* 32.7   MCHC g/dL  --  34.4 33.2   RDW %  --  12.5 12.6   RDW-SD fl  --  44.3 45.8   MPV fL  --  9.7 9.4   NEUTROPHIL % %  --   --  71.8   LYMPHOCYTE % %  --   --  17.5*   MONOCYTES % %  --   --  6.6   EOSINOPHIL % %  --   --  2.3   BASOPHIL % %  --   --  1.5   IMM GRAN % %  --   --  0.3   NEUTROS ABS 10*3/mm3  --   --  4.91   LYMPHS ABS 10*3/mm3  --   --  1.20   MONOS ABS 10*3/mm3  --   --  0.45   EOS ABS 10*3/mm3  --   --  0.16   BASOS ABS 10*3/mm3  --   --  0.10   IMMATURE GRANS (ABS) 10*3/mm3  --   --  0.02   NRBC /100 WBC  --   --  0.0                   Invalid input(s): LDLCALC                                  Results from last 7 days   Lab Units 10/13/20  1117   ADENOVIRUS DETECTION BY PCR  Not Detected   CORONAVIRUS 229E  Not Detected   CORONAVIRUS HKU1  Not Detected   CORONAVIRUS NL63  Not Detected   CORONAVIRUS OC43  Not Detected   HUMAN METAPNEUMOVIRUS  Not Detected   HUMAN RHINOVIRUS/ENTEROVIRUS  Not Detected   INFLUENZA B PCR  Not Detected   PARAINFLUENZA 1  Not Detected   PARAINFLUENZA VIRUS 2  Not Detected   PARAINFLUENZA VIRUS 3  Not Detected   PARAINFLUENZA VIRUS 4  Not Detected   BORDETELLA PERTUSSIS PCR  Not Detected   BORDETELLA  PARAPERTUSSIS PCR  Not Detected   [id number]  Not Detected   CHLAMYDOPHILA PNEUMONIAE PCR  Not Detected   MYCOPLAMA PNEUMO PCR  Not Detected   INFLUENZA A H3  Not Detected   INFLUENZA A H1  Not Detected   RSV, PCR  Not Detected           Imaging Results:  Imaging Results (Most Recent)     Procedure Component Value Units Date/Time    XR Hip With or Without Pelvis 2 - 3 View Right [[id number]] Collected: 10/13/20 1809     Updated: 10/13/20 1956    Narrative:      TWO-VIEW PORTABLE RIGHT HIP IN OR     HISTORY: Internal fixation of fracture.      FINDINGS: Imaging in the operating room was performed at the time of  internal fixation of an intertrochanteric fracture with intramedullary  carmen and intersecting screws and the alignment appears satisfactory. Six  images were obtained and the fluoroscopy time measures 1 minute.     This report was finalized on 10/13/2020 7:53 PM by Dr. Rogelio Minaya M.D.       XR Hip With or Without Pelvis 1 View Right [[id number]] Collected: 10/13/20 1835     Updated: 10/13/20 1839    Narrative:      PORTABLE JOINT X-RAY     HISTORY: Right hip fracture repair.     Portable x-ray of the pelvis and right hip is provided.     FINDINGS:  There is right hip gamma nail hardware, positioned as  expected. Major proximal femur fracture fragments have been reduced to  near-anatomic position. No periprosthetic fracture is identified.  There  are expected post operative changes in the soft tissues.       Impression:      Postoperative change from right proximal femur fracture  repair..     This report was finalized on 10/13/2020 6:36 PM by Dr. Ian Doss M.D.       FL C Arm During Surgery [[id number]] Resulted: 10/13/20 1751     Updated: 10/13/20 1751    Narrative:      This procedure was auto-finalized with no dictation required.    XR Shoulder 2+ View Right [[id number]] Collected: 10/13/20 1036     Updated: 10/13/20 1047    Narrative:      XR CHEST 1 VW-, XR SHOULDER 2+ VW RIGHT-, XR HIP W OR WO  PELVIS 2-3 VIEW  RIGHT-     HISTORY:  Preop. Hip fracture. Right shoulder pain.     COMPARISON:  Chest radiographs 06/16/2017.     FINDINGS:    Chest: A single portable view of the chest was obtained.  The cardiac silhouette is enlarged, slightly progressed from 2017. The  descending aorta is tortuous. There is calcific aortic atherosclerosis.  There is ascending aortic enlargement. There is central pulmonary venous  congestion. There is no new focal consolidation. Pleural spaces are  clear. There is multilevel degenerative disc disease.     Right shoulder: 2 views of the right shoulder were obtained.  There is mild acromioclavicular osteoarthritis. There is at least  moderate glenohumeral osteoarthritis with joint space narrowing,  subchondral sclerosis, and marginal osteophytes. There is narrowing of  the distance between the humeral head and acromion, which can be seen  with rotator cuff pathology. There is a 1.6 cm osseous fragment  projecting superior to the humeral head, which may represent an  intra-articular body. This was not included in the field-of-view on  prior chest radiograph from 2017. No acute fracture is identified.     Right hip: 5 views of the pelvis and right hip were obtained.  There is degenerative disc disease in the lower lumbar spine,  incompletely assessed. There is a partially imaged left femoral  intramedullary carmen with a dynamic screw traversing the femoral neck.  There has been interval healing of the left intertrochanteric fracture  when compared to 2017. There is a mildly displaced acute comminuted  right intertrochanteric fracture with mild medial and dorsal angulation  of the distal fracture fragment. Joint spaces are maintained. There is  soft tissue gas projecting over the right pubic bone, concerning for a  bowel containing abdominal hernia.       Impression:      COMBINED IMPRESSION:  1.  Acute displaced right intertrochanteric femoral fracture, as above.  2.  Right shoulder  osteoarthritis with findings suggestive of rotator  cuff pathology and a probable intra-articular body.  3.  Suspected bowel containing right inguinal hernia.  4.  Cardiomegaly, slightly progressed from 2017.  5.  Central pulmonary venous congestion. No focal consolidation or  effusion.     This report was finalized on 10/13/2020 10:44 AM by Dr. Francesca Gonzalez M.D.       XR Chest 1 View [[id number]] Collected: 10/13/20 1036     Updated: 10/13/20 1047    Narrative:      XR CHEST 1 VW-, XR SHOULDER 2+ VW RIGHT-, XR HIP W OR WO PELVIS 2-3 VIEW  RIGHT-     HISTORY:  Preop. Hip fracture. Right shoulder pain.     COMPARISON:  Chest radiographs 06/16/2017.     FINDINGS:    Chest: A single portable view of the chest was obtained.  The cardiac silhouette is enlarged, slightly progressed from 2017. The  descending aorta is tortuous. There is calcific aortic atherosclerosis.  There is ascending aortic enlargement. There is central pulmonary venous  congestion. There is no new focal consolidation. Pleural spaces are  clear. There is multilevel degenerative disc disease.     Right shoulder: 2 views of the right shoulder were obtained.  There is mild acromioclavicular osteoarthritis. There is at least  moderate glenohumeral osteoarthritis with joint space narrowing,  subchondral sclerosis, and marginal osteophytes. There is narrowing of  the distance between the humeral head and acromion, which can be seen  with rotator cuff pathology. There is a 1.6 cm osseous fragment  projecting superior to the humeral head, which may represent an  intra-articular body. This was not included in the field-of-view on  prior chest radiograph from 2017. No acute fracture is identified.     Right hip: 5 views of the pelvis and right hip were obtained.  There is degenerative disc disease in the lower lumbar spine,  incompletely assessed. There is a partially imaged left femoral  intramedullary carmen with a dynamic screw traversing the femoral  neck.  There has been interval healing of the left intertrochanteric fracture  when compared to 2017. There is a mildly displaced acute comminuted  right intertrochanteric fracture with mild medial and dorsal angulation  of the distal fracture fragment. Joint spaces are maintained. There is  soft tissue gas projecting over the right pubic bone, concerning for a  bowel containing abdominal hernia.       Impression:      COMBINED IMPRESSION:  1.  Acute displaced right intertrochanteric femoral fracture, as above.  2.  Right shoulder osteoarthritis with findings suggestive of rotator  cuff pathology and a probable intra-articular body.  3.  Suspected bowel containing right inguinal hernia.  4.  Cardiomegaly, slightly progressed from 2017.  5.  Central pulmonary venous congestion. No focal consolidation or  effusion.     This report was finalized on 10/13/2020 10:44 AM by Dr. Francesca Gonzalez M.D.       XR Hip With or Without Pelvis 2 - 3 View Right [[id number]] Collected: 10/13/20 1036     Updated: 10/13/20 1047    Narrative:      XR CHEST 1 VW-, XR SHOULDER 2+ VW RIGHT-, XR HIP W OR WO PELVIS 2-3 VIEW  RIGHT-     HISTORY:  Preop. Hip fracture. Right shoulder pain.     COMPARISON:  Chest radiographs 06/16/2017.     FINDINGS:    Chest: A single portable view of the chest was obtained.  The cardiac silhouette is enlarged, slightly progressed from 2017. The  descending aorta is tortuous. There is calcific aortic atherosclerosis.  There is ascending aortic enlargement. There is central pulmonary venous  congestion. There is no new focal consolidation. Pleural spaces are  clear. There is multilevel degenerative disc disease.     Right shoulder: 2 views of the right shoulder were obtained.  There is mild acromioclavicular osteoarthritis. There is at least  moderate glenohumeral osteoarthritis with joint space narrowing,  subchondral sclerosis, and marginal osteophytes. There is narrowing of  the distance between the humeral  head and acromion, which can be seen  with rotator cuff pathology. There is a 1.6 cm osseous fragment  projecting superior to the humeral head, which may represent an  intra-articular body. This was not included in the field-of-view on  prior chest radiograph from 2017. No acute fracture is identified.     Right hip: 5 views of the pelvis and right hip were obtained.  There is degenerative disc disease in the lower lumbar spine,  incompletely assessed. There is a partially imaged left femoral  intramedullary carmen with a dynamic screw traversing the femoral neck.  There has been interval healing of the left intertrochanteric fracture  when compared to 2017. There is a mildly displaced acute comminuted  right intertrochanteric fracture with mild medial and dorsal angulation  of the distal fracture fragment. Joint spaces are maintained. There is  soft tissue gas projecting over the right pubic bone, concerning for a  bowel containing abdominal hernia.       Impression:      COMBINED IMPRESSION:  1.  Acute displaced right intertrochanteric femoral fracture, as above.  2.  Right shoulder osteoarthritis with findings suggestive of rotator  cuff pathology and a probable intra-articular body.  3.  Suspected bowel containing right inguinal hernia.  4.  Cardiomegaly, slightly progressed from 2017.  5.  Central pulmonary venous congestion. No focal consolidation or  effusion.     This report was finalized on 10/13/2020 10:44 AM by Dr. Francesca Gonzalez M.D.               Objective:   Temp:  [97.3 °F (36.3 °C)-98.2 °F (36.8 °C)] 97.7 °F (36.5 °C)  Heart Rate:  [80-99] 80  Resp:  [16-18] 16  BP: ()/(48-77) 107/63   SpO2:  [92 %-97 %] 95 %  on    Device (Oxygen Therapy): room air    Intake/Output Summary (Last 24 hours) at 10/15/2020 0914  Last data filed at 10/15/2020 0902  Gross per 24 hour   Intake 840 ml   Output 1000 ml   Net -160 ml     Body mass index is 25.02 kg/m².      10/13/20  1124 10/13/20  1353   Weight: 70.3 kg  (155 lb) 70.3 kg (155 lb)       Physical Exam  Constitutional:       General: She is not in acute distress.     Appearance: She is not diaphoretic.   HENT:      Head: Atraumatic.      Nose: Nose normal.   Eyes:      Conjunctiva/sclera: Conjunctivae normal.   Cardiovascular:      Rate and Rhythm: Normal rate and regular rhythm.      Heart sounds: No murmur.   Pulmonary:      Effort: Pulmonary effort is normal.      Breath sounds: No wheezing or rales.   Abdominal:      General: Bowel sounds are normal.      Palpations: Abdomen is soft.      Tenderness: There is no abdominal tenderness.      Hernia: A hernia (R inguinal and reducible without tenderness) is present.   Musculoskeletal:         General: Swelling (1-2+ right hip) and tenderness (R hip) present.   Skin:     General: Skin is warm and dry.   Neurological:      Mental Status: She is alert.         Discharge Medications and Instructions:     Discharge Medications     Discharge Medications      New Medications      Instructions Start Date   acetaminophen 325 MG tablet  Commonly known as: TYLENOL   650 mg, Oral, Every 4 Hours PRN      docusate sodium 100 MG capsule   100 mg, Oral, 2 Times Daily      enoxaparin 40 MG/0.4ML solution syringe  Commonly known as: LOVENOX   40 mg, Subcutaneous, Daily      melatonin 3 MG tablet   3 mg, Oral, Nightly PRN         Continue These Medications      Instructions Start Date   CBD KINGS EX   Apply externally              Medication Reconciliation: Please see electronically completed Med Rec.    Discharge Diet:    Dietary Orders (From admission, onward)     Start     Ordered    10/13/20 1936  Diet Regular  Diet Effective Now     Question:  Diet Texture / Consistency  Answer:  Regular    10/13/20 1935                Activity at Discharge:    Activity Instructions     Activity as Tolerated             Discharge disposition: Home    Discharge Instructions and Follow ups:  Additional Instructions for the Follow-ups that You Need to  Schedule     Referral to Home Health   As directed      Face to Face Visit Date: 10/15/2020    Follow-up provider for Plan of Care?: I treated the patient in an acute care facility and will not continue treatment after discharge.    Follow-up provider: WARREN GOODRICH [[id number]]    Reason/Clinical Findings: r hip fracture    Describe mobility limitations that make leaving home difficult: NWB RLE    Nursing/Therapeutic Services Requested: Physical Therapy    PT orders: Total joint pathway Therapeutic exercise Gait Training Transfer training Strengthening Home safety assessment    Weight Bearing Status: Partial Weight Bearing    Frequency: 1 Week 1            Contact information for follow-up providers     Zabrina Andrews MD. Schedule an appointment as soon as possible for a visit on 10/27/2020.    Specialty: Orthopedic Surgery  Contact information:  [address]  [facility] [id number] [phone]             Provider, No Known .    Contact information:  [facility] [address] [phone]                   Contact information for after-discharge care     Home Medical Care     [facility] .    Service: Home Health Services  Contact information:  [address]  [facility] [address] [phone]                           No future appointments.    MARCELO Fuentes  10/15/20  9:14 AM EDT

## 2020-10-15 NOTE — PROGRESS NOTES
Patient: Carla Campos  YOB: 1935     Date of Admission: 10/13/2020  9:50 AM Medical Record Number: [id number]     Attending Physician: Blaise Everett MD    Procedure(s):  RT. GAMMA NAIL Post Operative Day Number: 2    Subjective : No new orthopaedic complaints     Pain Relief: some relief with present medication.     Systemic Complaints: No Complaints  Vitals:    10/14/20 2237 10/14/20 2300 10/15/20 0239 10/15/20 0659   BP: (!) 89/48  Comment: left arm 94/56 102/62 110/61 107/63   BP Location: Right arm Left arm Left arm Right arm   Patient Position: Lying  Lying Lying   Pulse: 83  81 80   Resp: 18  18 16   Temp: 97.7 °F (36.5 °C)  97.3 °F (36.3 °C) 97.7 °F (36.5 °C)   TempSrc: Skin  Skin Skin   SpO2: 92%  94% 95%   Weight:       Height:           Physical Exam: 85 y.o. female    General Appearance:       Alert, cooperative, in no acute distress                  Extremities:    Dressing Clean, Dry and Intact         Incision healthy without signs or symptoms of infections         No clinical sign of DVT        Able to do good movements of digits    Pulses:   Pulses palpable and equal bilaterally           Diagnostic Tests:     Results from last 7 days   Lab Units 10/14/20  1319 10/14/20  0356 10/13/20  1013   WBC 10*3/mm3  --  7.65 6.84   HEMOGLOBIN g/dL 10.6* 10.4* 12.9   HEMATOCRIT % 31.3* 30.2* 38.9   PLATELETS 10*3/mm3  --  223 272     Results from last 7 days   Lab Units 10/15/20  0351 10/14/20  0356 10/13/20  1013   SODIUM mmol/L 134* 135* 138   POTASSIUM mmol/L 4.1 4.0 4.1   CHLORIDE mmol/L 104 102 105   CO2 mmol/L 23.4 21.0* 23.0   BUN mg/dL 18 17 22   CREATININE mg/dL 0.65 0.73 0.75   GLUCOSE mg/dL 122* 161* 108*   CALCIUM mg/dL 8.4* 8.7 9.3         No results found for: CRP  No results found for: SEDRATE  No results found for: URICACID  No results found for: CRYSTAL  Microbiology Results (last 10 days)     Procedure Component Value - Date/Time    COVID PRE-OP / PRE-PROCEDURE  SCREENING ORDER (NO ISOLATION) - Swab, Nasopharynx [[id number]]  (Normal) Collected: 10/13/20 1117    Lab Status: Final result Specimen: Swab from Nasopharynx Updated: 10/13/20 1236    Narrative:      The following orders were created for panel order COVID PRE-OP / PRE-PROCEDURE SCREENING ORDER (NO ISOLATION) - Swab, Nasopharynx.  Procedure                               Abnormality         Status                     ---------                               -----------         ------                     Respiratory Panel PCR w/...[[id number]]  Normal              Final result                 Please view results for these tests on the individual orders.    Respiratory Panel PCR w/COVID-19(SARS-CoV-2) [facility]ON/DESTINEY/[facility] In-House, NP Swab in UTM/VTM, 3-4 HR TAT - Swab, Nasopharynx [[id number]]  (Normal) Collected: 10/13/20 1117    Lab Status: Final result Specimen: Swab from Nasopharynx Updated: 10/13/20 1236     ADENOVIRUS, PCR Not Detected     Coronavirus 229E Not Detected     Coronavirus HKU1 Not Detected     Coronavirus NL63 Not Detected     Coronavirus OC43 Not Detected     COVID19 Not Detected     Human Metapneumovirus Not Detected     Human Rhinovirus/Enterovirus Not Detected     Influenza A PCR Not Detected     Influenza A H1 Not Detected     Influenza A H1 2009 PCR Not Detected     Influenza A H3 Not Detected     Influenza B PCR Not Detected     Parainfluenza Virus 1 Not Detected     Parainfluenza Virus 2 Not Detected     Parainfluenza Virus 3 Not Detected     Parainfluenza Virus 4 Not Detected     RSV, PCR Not Detected     Bordetella pertussis pcr Not Detected     Bordetella parapertussis PCR Not Detected     Chlamydophila pneumoniae PCR Not Detected     Mycoplasma pneumo by PCR Not Detected    Narrative:      Fact sheet for providers: https://docs.[employer]/wp-content/uploads/TYM9283-1338-RE6.1-EUA-Provider-Fact-Sheet-3.pdf    Fact sheet for patients:  https://docs.[employer]/wp-content/uploads/GEJ2058-3741-IU1.1-EUA-Patient-Fact-Sheet-1.pdf        Xr Shoulder 2+ View Right    Result Date: 10/13/2020  COMBINED IMPRESSION: 1.  Acute displaced right intertrochanteric femoral fracture, as above. 2.  Right shoulder osteoarthritis with findings suggestive of rotator cuff pathology and a probable intra-articular body. 3.  Suspected bowel containing right inguinal hernia. 4.  Cardiomegaly, slightly progressed from 2017. 5.  Central pulmonary venous congestion. No focal consolidation or effusion.  This report was finalized on 10/13/2020 10:44 AM by Dr. Francesca Gonzalez M.D.      Xr Chest 1 View    Result Date: 10/13/2020  COMBINED IMPRESSION: 1.  Acute displaced right intertrochanteric femoral fracture, as above. 2.  Right shoulder osteoarthritis with findings suggestive of rotator cuff pathology and a probable intra-articular body. 3.  Suspected bowel containing right inguinal hernia. 4.  Cardiomegaly, slightly progressed from 2017. 5.  Central pulmonary venous congestion. No focal consolidation or effusion.  This report was finalized on 10/13/2020 10:44 AM by Dr. Francesca Gonzalez M.D.      Xr Hip With Or Without Pelvis 1 View Right    Result Date: 10/13/2020  Postoperative change from right proximal femur fracture repair..  This report was finalized on 10/13/2020 6:36 PM by Dr. Ian Doss M.D.      Xr Hip With Or Without Pelvis 2 - 3 View Right    Result Date: 10/13/2020  COMBINED IMPRESSION: 1.  Acute displaced right intertrochanteric femoral fracture, as above. 2.  Right shoulder osteoarthritis with findings suggestive of rotator cuff pathology and a probable intra-articular body. 3.  Suspected bowel containing right inguinal hernia. 4.  Cardiomegaly, slightly progressed from 2017. 5.  Central pulmonary venous congestion. No focal consolidation or effusion.  This report was finalized on 10/13/2020 10:44 AM by Dr. Francesca Gonzalez M.D.              Current  Medications:  Scheduled Meds:docusate sodium, 100 mg, Oral, BID  enoxaparin, 40 mg, Subcutaneous, Daily  vitamin B-1, 100 mg, Oral, Daily    And  multivitamin, 1 tablet, Oral, Daily    And  folic acid, 1 mg, Oral, Daily  sodium chloride, 10 mL, Intravenous, Q12H      Continuous Infusions:   PRN Meds:.•  acetaminophen  •  bisacodyl  •  docusate sodium  •  HYDROcodone-acetaminophen  •  LORazepam **OR** LORazepam **OR** LORazepam **OR** LORazepam **OR** LORazepam **OR** LORazepam  •  melatonin  •  Morphine **AND** naloxone  •  ondansetron  •  sodium chloride    Assessment:    Procedure(s):  RT. GAMMA NAIL    Patient Active Problem List   Diagnosis   • Closed left hip fracture (CMS/[facility])   • Closed intertrochanteric fracture of hip, right, initial encounter (CMS/[facility])   • Inguinal hernia   • Fall   • Hyperglycemia   • Chronic alcohol use   • Acute blood loss anemia   • Insomnia   • Acute urinary retention       PLAN:   Continues current post-op course  Anticoagulation: Lovenox started  Dressing Change in am  Mobilize with PT as tolerated per protocol    Weight Bearing: WBAT  Discharge Plan: OK to plan for discharge in  today to home and home health  from orthopadic perspective.      MARCELO Mead    Date: 10/15/2020    Time: 07:22 EDT

## 2020-12-02 ENCOUNTER — APPOINTMENT (OUTPATIENT)
Dept: GENERAL RADIOLOGY | Facility: HOSPITAL | Age: 85
End: 2020-12-02

## 2020-12-02 ENCOUNTER — HOSPITAL ENCOUNTER (INPATIENT)
Facility: HOSPITAL | Age: 85
LOS: 6 days | Discharge: HOME | End: 2020-12-08
Attending: EMERGENCY MEDICINE | Admitting: HOSPITALIST

## 2020-12-02 DIAGNOSIS — T84.020A DISLOCATION OF INTERNAL RIGHT HIP PROSTHESIS, INITIAL ENCOUNTER (HCC): ICD-10-CM

## 2020-12-02 DIAGNOSIS — S72.141A CLOSED INTERTROCHANTERIC FRACTURE OF HIP, RIGHT, INITIAL ENCOUNTER (HCC): Primary | ICD-10-CM

## 2020-12-02 DIAGNOSIS — M97.01XA PERIPROSTHETIC FRACTURE AROUND INTERNAL PROSTHETIC RIGHT HIP JOINT, INITIAL ENCOUNTER (HCC): ICD-10-CM

## 2020-12-02 PROBLEM — R01.1 CARDIAC MURMUR: Status: ACTIVE | Noted: 2020-12-02

## 2020-12-02 PROBLEM — R60.0 EDEMA OF BOTH LOWER EXTREMITIES: Status: ACTIVE | Noted: 2020-12-02

## 2020-12-02 LAB
ALBUMIN SERPL-MCNC: 4.2 G/DL (ref 3.5–5.2)
ALBUMIN/GLOB SERPL: 1.3 G/DL
ALP SERPL-CCNC: 133 U/L (ref 39–117)
ALT SERPL W P-5'-P-CCNC: 10 U/L (ref 1–33)
ANION GAP SERPL CALCULATED.3IONS-SCNC: 6.8 MMOL/L (ref 5–15)
AST SERPL-CCNC: 18 U/L (ref 1–32)
B PARAPERT DNA SPEC QL NAA+PROBE: NOT DETECTED
B PERT DNA SPEC QL NAA+PROBE: NOT DETECTED
BASOPHILS # BLD AUTO: 0.09 10*3/MM3 (ref 0–0.2)
BASOPHILS NFR BLD AUTO: 1.1 % (ref 0–1.5)
BILIRUB SERPL-MCNC: 0.5 MG/DL (ref 0–1.2)
BUN SERPL-MCNC: 15 MG/DL (ref 8–23)
BUN/CREAT SERPL: 24.2 (ref 7–25)
C PNEUM DNA NPH QL NAA+NON-PROBE: NOT DETECTED
CALCIUM SPEC-SCNC: 9.4 MG/DL (ref 8.6–10.5)
CHLORIDE SERPL-SCNC: 104 MMOL/L (ref 98–107)
CO2 SERPL-SCNC: 27.2 MMOL/L (ref 22–29)
CREAT SERPL-MCNC: 0.62 MG/DL (ref 0.57–1)
DEPRECATED RDW RBC AUTO: 44.1 FL (ref 37–54)
EOSINOPHIL # BLD AUTO: 0.12 10*3/MM3 (ref 0–0.4)
EOSINOPHIL NFR BLD AUTO: 1.5 % (ref 0.3–6.2)
ERYTHROCYTE [DISTWIDTH] IN BLOOD BY AUTOMATED COUNT: 12.4 % (ref 12.3–15.4)
FLUAV SUBTYP SPEC NAA+PROBE: NOT DETECTED
FLUBV RNA ISLT QL NAA+PROBE: NOT DETECTED
GFR SERPL CREATININE-BSD FRML MDRD: 92 ML/MIN/1.73
GLOBULIN UR ELPH-MCNC: 3.2 GM/DL
GLUCOSE SERPL-MCNC: 126 MG/DL (ref 65–99)
HADV DNA SPEC NAA+PROBE: NOT DETECTED
HCOV 229E RNA SPEC QL NAA+PROBE: NOT DETECTED
HCOV HKU1 RNA SPEC QL NAA+PROBE: NOT DETECTED
HCOV NL63 RNA SPEC QL NAA+PROBE: NOT DETECTED
HCOV OC43 RNA SPEC QL NAA+PROBE: NOT DETECTED
HCT VFR BLD AUTO: 37.1 % (ref 34–46.6)
HGB BLD-MCNC: 11.9 G/DL (ref 12–15.9)
HMPV RNA NPH QL NAA+NON-PROBE: NOT DETECTED
HPIV1 RNA SPEC QL NAA+PROBE: NOT DETECTED
HPIV2 RNA SPEC QL NAA+PROBE: NOT DETECTED
HPIV3 RNA NPH QL NAA+PROBE: NOT DETECTED
HPIV4 P GENE NPH QL NAA+PROBE: NOT DETECTED
IMM GRANULOCYTES # BLD AUTO: 0.04 10*3/MM3 (ref 0–0.05)
IMM GRANULOCYTES NFR BLD AUTO: 0.5 % (ref 0–0.5)
LYMPHOCYTES # BLD AUTO: 0.78 10*3/MM3 (ref 0.7–3.1)
LYMPHOCYTES NFR BLD AUTO: 9.6 % (ref 19.6–45.3)
M PNEUMO IGG SER IA-ACNC: NOT DETECTED
MCH RBC QN AUTO: 31.2 PG (ref 26.6–33)
MCHC RBC AUTO-ENTMCNC: 32.1 G/DL (ref 31.5–35.7)
MCV RBC AUTO: 97.1 FL (ref 79–97)
MONOCYTES # BLD AUTO: 0.68 10*3/MM3 (ref 0.1–0.9)
MONOCYTES NFR BLD AUTO: 8.3 % (ref 5–12)
NEUTROPHILS NFR BLD AUTO: 6.45 10*3/MM3 (ref 1.7–7)
NEUTROPHILS NFR BLD AUTO: 79 % (ref 42.7–76)
NRBC BLD AUTO-RTO: 0 /100 WBC (ref 0–0.2)
PLATELET # BLD AUTO: 305 10*3/MM3 (ref 140–450)
PMV BLD AUTO: 9.1 FL (ref 6–12)
POTASSIUM SERPL-SCNC: 4.1 MMOL/L (ref 3.5–5.2)
PROT SERPL-MCNC: 7.4 G/DL (ref 6–8.5)
RBC # BLD AUTO: 3.82 10*6/MM3 (ref 3.77–5.28)
RHINOVIRUS RNA SPEC NAA+PROBE: NOT DETECTED
RSV RNA NPH QL NAA+NON-PROBE: NOT DETECTED
SARS-COV-2 RNA NPH QL NAA+NON-PROBE: NOT DETECTED
SODIUM SERPL-SCNC: 138 MMOL/L (ref 136–145)
WBC # BLD AUTO: 8.16 10*3/MM3 (ref 3.4–10.8)

## 2020-12-02 PROCEDURE — 63710000001 ONDANSETRON ODT 4 MG TABLET DISPERSIBLE: Performed by: EMERGENCY MEDICINE

## 2020-12-02 PROCEDURE — 0202U NFCT DS 22 TRGT SARS-COV-2: CPT | Performed by: PHYSICIAN ASSISTANT

## 2020-12-02 PROCEDURE — 99284 EMERGENCY DEPT VISIT MOD MDM: CPT

## 2020-12-02 PROCEDURE — 85025 COMPLETE CBC W/AUTO DIFF WBC: CPT | Performed by: PHYSICIAN ASSISTANT

## 2020-12-02 PROCEDURE — 80053 COMPREHEN METABOLIC PANEL: CPT | Performed by: PHYSICIAN ASSISTANT

## 2020-12-02 PROCEDURE — 73502 X-RAY EXAM HIP UNI 2-3 VIEWS: CPT

## 2020-12-02 RX ORDER — SODIUM CHLORIDE 0.9 % (FLUSH) 0.9 %
10 SYRINGE (ML) INJECTION AS NEEDED
Status: DISCONTINUED | OUTPATIENT
Start: 2020-12-02 | End: 2020-12-04

## 2020-12-02 RX ORDER — DEXTROSE MONOHYDRATE 50 MG/ML
125 INJECTION, SOLUTION INTRAVENOUS CONTINUOUS
Status: DISCONTINUED | OUTPATIENT
Start: 2020-12-02 | End: 2020-12-02

## 2020-12-02 RX ORDER — SODIUM CHLORIDE 0.9 % (FLUSH) 0.9 %
10 SYRINGE (ML) INJECTION EVERY 12 HOURS SCHEDULED
Status: DISCONTINUED | OUTPATIENT
Start: 2020-12-02 | End: 2020-12-02

## 2020-12-02 RX ORDER — ACETAMINOPHEN 160 MG/5ML
650 SOLUTION ORAL EVERY 4 HOURS PRN
Status: DISCONTINUED | OUTPATIENT
Start: 2020-12-02 | End: 2020-12-03

## 2020-12-02 RX ORDER — SODIUM CHLORIDE 0.9 % (FLUSH) 0.9 %
10 SYRINGE (ML) INJECTION AS NEEDED
Status: DISCONTINUED | OUTPATIENT
Start: 2020-12-02 | End: 2020-12-02

## 2020-12-02 RX ORDER — POLYETHYLENE GLYCOL 3350 17 G/17G
17 POWDER, FOR SOLUTION ORAL DAILY
Status: DISCONTINUED | OUTPATIENT
Start: 2020-12-02 | End: 2020-12-08 | Stop reason: HOSPADM

## 2020-12-02 RX ORDER — HYDROCODONE BITARTRATE AND ACETAMINOPHEN 7.5; 325 MG/1; MG/1
1 TABLET ORAL ONCE
Status: COMPLETED | OUTPATIENT
Start: 2020-12-02 | End: 2020-12-02

## 2020-12-02 RX ORDER — ACETAMINOPHEN 325 MG/1
650 TABLET ORAL EVERY 4 HOURS PRN
Status: DISCONTINUED | OUTPATIENT
Start: 2020-12-02 | End: 2020-12-08 | Stop reason: HOSPADM

## 2020-12-02 RX ORDER — ACETAMINOPHEN 650 MG/1
650 SUPPOSITORY RECTAL EVERY 4 HOURS PRN
Status: DISCONTINUED | OUTPATIENT
Start: 2020-12-02 | End: 2020-12-03

## 2020-12-02 RX ORDER — ONDANSETRON 4 MG/1
4 TABLET, ORALLY DISINTEGRATING ORAL ONCE
Status: COMPLETED | OUTPATIENT
Start: 2020-12-02 | End: 2020-12-02

## 2020-12-02 RX ORDER — UREA 10 %
3 LOTION (ML) TOPICAL NIGHTLY PRN
Status: DISCONTINUED | OUTPATIENT
Start: 2020-12-02 | End: 2020-12-08 | Stop reason: HOSPADM

## 2020-12-02 RX ORDER — ONDANSETRON 4 MG/1
4 TABLET, FILM COATED ORAL EVERY 6 HOURS PRN
Status: DISCONTINUED | OUTPATIENT
Start: 2020-12-02 | End: 2020-12-04

## 2020-12-02 RX ORDER — SODIUM CHLORIDE 9 MG/ML
100 INJECTION, SOLUTION INTRAVENOUS CONTINUOUS
Status: DISCONTINUED | OUTPATIENT
Start: 2020-12-02 | End: 2020-12-03

## 2020-12-02 RX ORDER — CALCIUM CARBONATE 200(500)MG
2 TABLET,CHEWABLE ORAL 2 TIMES DAILY PRN
Status: DISCONTINUED | OUTPATIENT
Start: 2020-12-02 | End: 2020-12-08 | Stop reason: HOSPADM

## 2020-12-02 RX ORDER — HYDROCODONE BITARTRATE AND ACETAMINOPHEN 7.5; 325 MG/1; MG/1
1 TABLET ORAL EVERY 6 HOURS PRN
Status: DISCONTINUED | OUTPATIENT
Start: 2020-12-02 | End: 2020-12-08 | Stop reason: HOSPADM

## 2020-12-02 RX ORDER — BISACODYL 5 MG/1
5 TABLET, DELAYED RELEASE ORAL DAILY PRN
Status: DISCONTINUED | OUTPATIENT
Start: 2020-12-02 | End: 2020-12-08 | Stop reason: HOSPADM

## 2020-12-02 RX ORDER — ONDANSETRON 2 MG/ML
4 INJECTION INTRAMUSCULAR; INTRAVENOUS EVERY 6 HOURS PRN
Status: DISCONTINUED | OUTPATIENT
Start: 2020-12-02 | End: 2020-12-04

## 2020-12-02 RX ADMIN — ONDANSETRON 4 MG: 4 TABLET, ORALLY DISINTEGRATING ORAL at 05:02

## 2020-12-02 RX ADMIN — SODIUM CHLORIDE 100 ML/HR: 9 INJECTION, SOLUTION INTRAVENOUS at 07:49

## 2020-12-02 RX ADMIN — HYDROCODONE BITARTRATE AND ACETAMINOPHEN 1 TABLET: 7.5; 325 TABLET ORAL at 11:20

## 2020-12-02 RX ADMIN — HYDROCODONE BITARTRATE AND ACETAMINOPHEN 1 TABLET: 7.5; 325 TABLET ORAL at 20:11

## 2020-12-02 RX ADMIN — HYDROCODONE BITARTRATE AND ACETAMINOPHEN 1 TABLET: 7.5; 325 TABLET ORAL at 05:02

## 2020-12-02 RX ADMIN — SODIUM CHLORIDE 100 ML/HR: 9 INJECTION, SOLUTION INTRAVENOUS at 19:30

## 2020-12-03 ENCOUNTER — APPOINTMENT (OUTPATIENT)
Dept: GENERAL RADIOLOGY | Facility: HOSPITAL | Age: 85
End: 2020-12-03

## 2020-12-03 ENCOUNTER — ANESTHESIA EVENT (OUTPATIENT)
Dept: PERIOP | Facility: HOSPITAL | Age: 85
End: 2020-12-03

## 2020-12-03 ENCOUNTER — ANESTHESIA (OUTPATIENT)
Dept: PERIOP | Facility: HOSPITAL | Age: 85
End: 2020-12-03

## 2020-12-03 PROBLEM — S72.141A CLOSED INTERTROCHANTERIC FRACTURE OF HIP, RIGHT, INITIAL ENCOUNTER (HCC): Status: RESOLVED | Noted: 2020-10-13 | Resolved: 2020-12-03

## 2020-12-03 PROBLEM — M97.01XA PERIPROSTHETIC FRACTURE AROUND INTERNAL PROSTHETIC RIGHT HIP JOINT (HCC): Status: RESOLVED | Noted: 2020-12-02 | Resolved: 2020-12-03

## 2020-12-03 LAB
ABO GROUP BLD: NORMAL
ANION GAP SERPL CALCULATED.3IONS-SCNC: 6.1 MMOL/L (ref 5–15)
BLD GP AB SCN SERPL QL: NEGATIVE
BUN SERPL-MCNC: 12 MG/DL (ref 8–23)
BUN/CREAT SERPL: 24.5 (ref 7–25)
CALCIUM SPEC-SCNC: 8.5 MG/DL (ref 8.6–10.5)
CHLORIDE SERPL-SCNC: 106 MMOL/L (ref 98–107)
CO2 SERPL-SCNC: 24.9 MMOL/L (ref 22–29)
CREAT SERPL-MCNC: 0.49 MG/DL (ref 0.57–1)
DEPRECATED RDW RBC AUTO: 42.7 FL (ref 37–54)
ERYTHROCYTE [DISTWIDTH] IN BLOOD BY AUTOMATED COUNT: 12.1 % (ref 12.3–15.4)
GFR SERPL CREATININE-BSD FRML MDRD: 121 ML/MIN/1.73
GLUCOSE SERPL-MCNC: 98 MG/DL (ref 65–99)
HCT VFR BLD AUTO: 30.9 % (ref 34–46.6)
HGB BLD-MCNC: 9.9 G/DL (ref 12–15.9)
MCH RBC QN AUTO: 31 PG (ref 26.6–33)
MCHC RBC AUTO-ENTMCNC: 32 G/DL (ref 31.5–35.7)
MCV RBC AUTO: 96.9 FL (ref 79–97)
PLATELET # BLD AUTO: 249 10*3/MM3 (ref 140–450)
PMV BLD AUTO: 9.4 FL (ref 6–12)
POTASSIUM SERPL-SCNC: 3.9 MMOL/L (ref 3.5–5.2)
RBC # BLD AUTO: 3.19 10*6/MM3 (ref 3.77–5.28)
RH BLD: POSITIVE
SODIUM SERPL-SCNC: 137 MMOL/L (ref 136–145)
T&S EXPIRATION DATE: NORMAL
WBC # BLD AUTO: 7.02 10*3/MM3 (ref 3.4–10.8)

## 2020-12-03 PROCEDURE — 87070 CULTURE OTHR SPECIMN AEROBIC: CPT | Performed by: ORTHOPAEDIC SURGERY

## 2020-12-03 PROCEDURE — 25010000003 CEFAZOLIN IN DEXTROSE 2-4 GM/100ML-% SOLUTION: Performed by: ORTHOPAEDIC SURGERY

## 2020-12-03 PROCEDURE — 87176 TISSUE HOMOGENIZATION CULTR: CPT | Performed by: ORTHOPAEDIC SURGERY

## 2020-12-03 PROCEDURE — 25010000002 ALBUMIN HUMAN 5% PER 50 ML: Performed by: NURSE ANESTHETIST, CERTIFIED REGISTERED

## 2020-12-03 PROCEDURE — 87015 SPECIMEN INFECT AGNT CONCNTJ: CPT | Performed by: ORTHOPAEDIC SURGERY

## 2020-12-03 PROCEDURE — 86900 BLOOD TYPING SEROLOGIC ABO: CPT | Performed by: ORTHOPAEDIC SURGERY

## 2020-12-03 PROCEDURE — 25010000002 HEPARIN (PORCINE) PER 1000 UNITS: Performed by: ORTHOPAEDIC SURGERY

## 2020-12-03 PROCEDURE — 76000 FLUOROSCOPY <1 HR PHYS/QHP: CPT

## 2020-12-03 PROCEDURE — 85027 COMPLETE CBC AUTOMATED: CPT | Performed by: NURSE PRACTITIONER

## 2020-12-03 PROCEDURE — 25010000002 NEOSTIGMINE PER 0.5 MG: Performed by: NURSE ANESTHETIST, CERTIFIED REGISTERED

## 2020-12-03 PROCEDURE — 36415 COLL VENOUS BLD VENIPUNCTURE: CPT | Performed by: NURSE PRACTITIONER

## 2020-12-03 PROCEDURE — C1776 JOINT DEVICE (IMPLANTABLE): HCPCS | Performed by: ORTHOPAEDIC SURGERY

## 2020-12-03 PROCEDURE — 86901 BLOOD TYPING SEROLOGIC RH(D): CPT | Performed by: ORTHOPAEDIC SURGERY

## 2020-12-03 PROCEDURE — 25010000002 KETOROLAC TROMETHAMINE PER 15 MG: Performed by: ORTHOPAEDIC SURGERY

## 2020-12-03 PROCEDURE — 87205 SMEAR GRAM STAIN: CPT | Performed by: ORTHOPAEDIC SURGERY

## 2020-12-03 PROCEDURE — 80048 BASIC METABOLIC PNL TOTAL CA: CPT | Performed by: NURSE PRACTITIONER

## 2020-12-03 PROCEDURE — 25010000002 ONDANSETRON PER 1 MG: Performed by: NURSE ANESTHETIST, CERTIFIED REGISTERED

## 2020-12-03 PROCEDURE — 25010000002 PROPOFOL 10 MG/ML EMULSION: Performed by: ANESTHESIOLOGY

## 2020-12-03 PROCEDURE — 25010000002 CLONIDINE PER 1 MG: Performed by: ORTHOPAEDIC SURGERY

## 2020-12-03 PROCEDURE — 25010000002 FENTANYL CITRATE (PF) 100 MCG/2ML SOLUTION: Performed by: ANESTHESIOLOGY

## 2020-12-03 PROCEDURE — 25010000002 ROPIVACAINE PER 1 MG: Performed by: ORTHOPAEDIC SURGERY

## 2020-12-03 PROCEDURE — 86850 RBC ANTIBODY SCREEN: CPT | Performed by: ORTHOPAEDIC SURGERY

## 2020-12-03 PROCEDURE — 25010000002 VANCOMYCIN PER 500 MG: Performed by: ORTHOPAEDIC SURGERY

## 2020-12-03 PROCEDURE — 0SR904A REPLACEMENT OF RIGHT HIP JOINT WITH CERAMIC ON POLYETHYLENE SYNTHETIC SUBSTITUTE, UNCEMENTED, OPEN APPROACH: ICD-10-PCS | Performed by: ORTHOPAEDIC SURGERY

## 2020-12-03 PROCEDURE — 25010000002 PHENYLEPHRINE PER 1 ML: Performed by: ANESTHESIOLOGY

## 2020-12-03 PROCEDURE — P9041 ALBUMIN (HUMAN),5%, 50ML: HCPCS | Performed by: NURSE ANESTHETIST, CERTIFIED REGISTERED

## 2020-12-03 PROCEDURE — 73501 X-RAY EXAM HIP UNI 1 VIEW: CPT

## 2020-12-03 PROCEDURE — 0QP604Z REMOVAL OF INTERNAL FIXATION DEVICE FROM RIGHT UPPER FEMUR, OPEN APPROACH: ICD-10-PCS | Performed by: ORTHOPAEDIC SURGERY

## 2020-12-03 PROCEDURE — 25010000002 DEXAMETHASONE PER 1 MG: Performed by: ANESTHESIOLOGY

## 2020-12-03 PROCEDURE — 87075 CULTR BACTERIA EXCEPT BLOOD: CPT | Performed by: ORTHOPAEDIC SURGERY

## 2020-12-03 DEVICE — OR3O DUAL MOBILITY LINER 42/54
Type: IMPLANTABLE DEVICE | Site: HIP | Status: FUNCTIONAL
Brand: OR3O DUAL MOBILITY

## 2020-12-03 DEVICE — OXINIUM FEMORAL HEAD 12/14 TAPER                                    28 MM +8
Type: IMPLANTABLE DEVICE | Site: HIP | Status: FUNCTIONAL
Brand: OXINIUM

## 2020-12-03 DEVICE — REDAPT 190MM SLEEVELESS REVISION                                    STEM SIZE 21 HIGH OFFSET
Type: IMPLANTABLE DEVICE | Site: HIP | Status: FUNCTIONAL
Brand: REDAPT

## 2020-12-03 DEVICE — SUT FW #2 W/TPR NDL 1/2 CIR 38IN 97CM 26.5MM BLU: Type: IMPLANTABLE DEVICE | Site: HIP | Status: FUNCTIONAL

## 2020-12-03 DEVICE — R3 3 HOLE ACETABULAR SHELL 54MM
Type: IMPLANTABLE DEVICE | Site: HIP | Status: FUNCTIONAL
Brand: R3 ACETABULAR

## 2020-12-03 DEVICE — OR3O DUAL MOBILITY XLPE INSERT 28/42
Type: IMPLANTABLE DEVICE | Site: HIP | Status: FUNCTIONAL
Brand: OR3O DUAL MOBILITY

## 2020-12-03 RX ORDER — CEFAZOLIN SODIUM 2 G/100ML
2 INJECTION, SOLUTION INTRAVENOUS EVERY 8 HOURS
Status: COMPLETED | OUTPATIENT
Start: 2020-12-03 | End: 2020-12-04

## 2020-12-03 RX ORDER — SODIUM CHLORIDE 9 MG/ML
100 INJECTION, SOLUTION INTRAVENOUS CONTINUOUS
Status: DISCONTINUED | OUTPATIENT
Start: 2020-12-03 | End: 2020-12-04

## 2020-12-03 RX ORDER — MAGNESIUM HYDROXIDE 1200 MG/15ML
LIQUID ORAL AS NEEDED
Status: DISCONTINUED | OUTPATIENT
Start: 2020-12-03 | End: 2020-12-03 | Stop reason: HOSPADM

## 2020-12-03 RX ORDER — DOCUSATE SODIUM 100 MG/1
100 CAPSULE, LIQUID FILLED ORAL 2 TIMES DAILY PRN
Status: DISCONTINUED | OUTPATIENT
Start: 2020-12-03 | End: 2020-12-08 | Stop reason: HOSPADM

## 2020-12-03 RX ORDER — HYDROCODONE BITARTRATE AND ACETAMINOPHEN 5; 325 MG/1; MG/1
1 TABLET ORAL ONCE AS NEEDED
Status: DISCONTINUED | OUTPATIENT
Start: 2020-12-03 | End: 2020-12-03 | Stop reason: HOSPADM

## 2020-12-03 RX ORDER — SODIUM CHLORIDE, SODIUM LACTATE, POTASSIUM CHLORIDE, CALCIUM CHLORIDE 600; 310; 30; 20 MG/100ML; MG/100ML; MG/100ML; MG/100ML
INJECTION, SOLUTION INTRAVENOUS CONTINUOUS PRN
Status: DISCONTINUED | OUTPATIENT
Start: 2020-12-03 | End: 2020-12-03 | Stop reason: SURG

## 2020-12-03 RX ORDER — HYDROCODONE BITARTRATE AND ACETAMINOPHEN 7.5; 325 MG/1; MG/1
1 TABLET ORAL EVERY 4 HOURS PRN
Status: DISCONTINUED | OUTPATIENT
Start: 2020-12-03 | End: 2020-12-08 | Stop reason: HOSPADM

## 2020-12-03 RX ORDER — OXYCODONE HYDROCHLORIDE AND ACETAMINOPHEN 5; 325 MG/1; MG/1
1 TABLET ORAL ONCE AS NEEDED
Status: DISCONTINUED | OUTPATIENT
Start: 2020-12-03 | End: 2020-12-03 | Stop reason: HOSPADM

## 2020-12-03 RX ORDER — LIDOCAINE HYDROCHLORIDE 40 MG/ML
SOLUTION TOPICAL AS NEEDED
Status: DISCONTINUED | OUTPATIENT
Start: 2020-12-03 | End: 2020-12-03 | Stop reason: SURG

## 2020-12-03 RX ORDER — ASPIRIN 81 MG/1
81 TABLET ORAL EVERY 12 HOURS SCHEDULED
Status: DISCONTINUED | OUTPATIENT
Start: 2020-12-03 | End: 2020-12-04

## 2020-12-03 RX ORDER — ACETAMINOPHEN 500 MG
1000 TABLET ORAL ONCE
Status: COMPLETED | OUTPATIENT
Start: 2020-12-03 | End: 2020-12-03

## 2020-12-03 RX ORDER — ONDANSETRON 2 MG/ML
4 INJECTION INTRAMUSCULAR; INTRAVENOUS ONCE AS NEEDED
Status: DISCONTINUED | OUTPATIENT
Start: 2020-12-03 | End: 2020-12-03 | Stop reason: HOSPADM

## 2020-12-03 RX ORDER — HYDROMORPHONE HYDROCHLORIDE 1 MG/ML
0.25 INJECTION, SOLUTION INTRAMUSCULAR; INTRAVENOUS; SUBCUTANEOUS
Status: DISCONTINUED | OUTPATIENT
Start: 2020-12-03 | End: 2020-12-03 | Stop reason: HOSPADM

## 2020-12-03 RX ORDER — ROCURONIUM BROMIDE 10 MG/ML
INJECTION, SOLUTION INTRAVENOUS AS NEEDED
Status: DISCONTINUED | OUTPATIENT
Start: 2020-12-03 | End: 2020-12-03 | Stop reason: SURG

## 2020-12-03 RX ORDER — FLUMAZENIL 0.1 MG/ML
0.2 INJECTION INTRAVENOUS AS NEEDED
Status: DISCONTINUED | OUTPATIENT
Start: 2020-12-03 | End: 2020-12-03 | Stop reason: HOSPADM

## 2020-12-03 RX ORDER — ACETAMINOPHEN 325 MG/1
325 TABLET ORAL EVERY 4 HOURS PRN
Status: DISCONTINUED | OUTPATIENT
Start: 2020-12-03 | End: 2020-12-04

## 2020-12-03 RX ORDER — CEFAZOLIN SODIUM 2 G/100ML
2 INJECTION, SOLUTION INTRAVENOUS ONCE
Status: COMPLETED | OUTPATIENT
Start: 2020-12-03 | End: 2020-12-03

## 2020-12-03 RX ORDER — FENTANYL CITRATE 50 UG/ML
25 INJECTION, SOLUTION INTRAMUSCULAR; INTRAVENOUS
Status: DISCONTINUED | OUTPATIENT
Start: 2020-12-03 | End: 2020-12-03 | Stop reason: HOSPADM

## 2020-12-03 RX ORDER — SODIUM CHLORIDE 0.9 % (FLUSH) 0.9 %
1-10 SYRINGE (ML) INJECTION AS NEEDED
Status: DISCONTINUED | OUTPATIENT
Start: 2020-12-03 | End: 2020-12-08 | Stop reason: HOSPADM

## 2020-12-03 RX ORDER — SODIUM CHLORIDE, SODIUM LACTATE, POTASSIUM CHLORIDE, CALCIUM CHLORIDE 600; 310; 30; 20 MG/100ML; MG/100ML; MG/100ML; MG/100ML
9 INJECTION, SOLUTION INTRAVENOUS CONTINUOUS
Status: DISCONTINUED | OUTPATIENT
Start: 2020-12-03 | End: 2020-12-03

## 2020-12-03 RX ORDER — SODIUM CHLORIDE 0.9 % (FLUSH) 0.9 %
3 SYRINGE (ML) INJECTION EVERY 12 HOURS SCHEDULED
Status: DISCONTINUED | OUTPATIENT
Start: 2020-12-03 | End: 2020-12-03 | Stop reason: HOSPADM

## 2020-12-03 RX ORDER — EPHEDRINE SULFATE 50 MG/ML
5 INJECTION, SOLUTION INTRAVENOUS ONCE AS NEEDED
Status: DISCONTINUED | OUTPATIENT
Start: 2020-12-03 | End: 2020-12-03 | Stop reason: HOSPADM

## 2020-12-03 RX ORDER — DEXAMETHASONE SODIUM PHOSPHATE 10 MG/ML
INJECTION INTRAMUSCULAR; INTRAVENOUS AS NEEDED
Status: DISCONTINUED | OUTPATIENT
Start: 2020-12-03 | End: 2020-12-03 | Stop reason: SURG

## 2020-12-03 RX ORDER — OXYCODONE HCL 10 MG/1
20 TABLET, FILM COATED, EXTENDED RELEASE ORAL ONCE
Status: COMPLETED | OUTPATIENT
Start: 2020-12-03 | End: 2020-12-03

## 2020-12-03 RX ORDER — ONDANSETRON 2 MG/ML
4 INJECTION INTRAMUSCULAR; INTRAVENOUS EVERY 6 HOURS PRN
Status: DISCONTINUED | OUTPATIENT
Start: 2020-12-03 | End: 2020-12-08 | Stop reason: HOSPADM

## 2020-12-03 RX ORDER — SODIUM CHLORIDE 0.9 % (FLUSH) 0.9 %
3 SYRINGE (ML) INJECTION EVERY 12 HOURS SCHEDULED
Status: DISCONTINUED | OUTPATIENT
Start: 2020-12-03 | End: 2020-12-08 | Stop reason: HOSPADM

## 2020-12-03 RX ORDER — SODIUM CHLORIDE 0.9 % (FLUSH) 0.9 %
3-10 SYRINGE (ML) INJECTION AS NEEDED
Status: DISCONTINUED | OUTPATIENT
Start: 2020-12-03 | End: 2020-12-03 | Stop reason: HOSPADM

## 2020-12-03 RX ORDER — ONDANSETRON 2 MG/ML
INJECTION INTRAMUSCULAR; INTRAVENOUS AS NEEDED
Status: DISCONTINUED | OUTPATIENT
Start: 2020-12-03 | End: 2020-12-03 | Stop reason: SURG

## 2020-12-03 RX ORDER — VANCOMYCIN HYDROCHLORIDE 1 G/200ML
1000 INJECTION, SOLUTION INTRAVENOUS ONCE
Status: COMPLETED | OUTPATIENT
Start: 2020-12-03 | End: 2020-12-03

## 2020-12-03 RX ORDER — DIPHENHYDRAMINE HYDROCHLORIDE 50 MG/ML
6.25 INJECTION INTRAMUSCULAR; INTRAVENOUS
Status: DISCONTINUED | OUTPATIENT
Start: 2020-12-03 | End: 2020-12-03 | Stop reason: HOSPADM

## 2020-12-03 RX ORDER — NALOXONE HCL 0.4 MG/ML
0.4 VIAL (ML) INJECTION AS NEEDED
Status: DISCONTINUED | OUTPATIENT
Start: 2020-12-03 | End: 2020-12-03 | Stop reason: HOSPADM

## 2020-12-03 RX ORDER — LIDOCAINE HYDROCHLORIDE 10 MG/ML
0.5 INJECTION, SOLUTION EPIDURAL; INFILTRATION; INTRACAUDAL; PERINEURAL ONCE AS NEEDED
Status: DISCONTINUED | OUTPATIENT
Start: 2020-12-03 | End: 2020-12-03 | Stop reason: HOSPADM

## 2020-12-03 RX ORDER — FENTANYL CITRATE 50 UG/ML
INJECTION, SOLUTION INTRAMUSCULAR; INTRAVENOUS AS NEEDED
Status: DISCONTINUED | OUTPATIENT
Start: 2020-12-03 | End: 2020-12-03 | Stop reason: SURG

## 2020-12-03 RX ORDER — PROPOFOL 10 MG/ML
VIAL (ML) INTRAVENOUS AS NEEDED
Status: DISCONTINUED | OUTPATIENT
Start: 2020-12-03 | End: 2020-12-03 | Stop reason: SURG

## 2020-12-03 RX ORDER — LABETALOL HYDROCHLORIDE 5 MG/ML
5 INJECTION, SOLUTION INTRAVENOUS
Status: DISCONTINUED | OUTPATIENT
Start: 2020-12-03 | End: 2020-12-03 | Stop reason: HOSPADM

## 2020-12-03 RX ORDER — HYDROMORPHONE HYDROCHLORIDE 1 MG/ML
0.5 INJECTION, SOLUTION INTRAMUSCULAR; INTRAVENOUS; SUBCUTANEOUS
Status: DISCONTINUED | OUTPATIENT
Start: 2020-12-03 | End: 2020-12-03 | Stop reason: HOSPADM

## 2020-12-03 RX ORDER — ALBUMIN, HUMAN INJ 5% 5 %
SOLUTION INTRAVENOUS CONTINUOUS PRN
Status: DISCONTINUED | OUTPATIENT
Start: 2020-12-03 | End: 2020-12-03 | Stop reason: SURG

## 2020-12-03 RX ORDER — GLYCOPYRROLATE 0.2 MG/ML
INJECTION INTRAMUSCULAR; INTRAVENOUS AS NEEDED
Status: DISCONTINUED | OUTPATIENT
Start: 2020-12-03 | End: 2020-12-03 | Stop reason: SURG

## 2020-12-03 RX ORDER — ONDANSETRON 4 MG/1
4 TABLET, FILM COATED ORAL EVERY 6 HOURS PRN
Status: DISCONTINUED | OUTPATIENT
Start: 2020-12-03 | End: 2020-12-08 | Stop reason: HOSPADM

## 2020-12-03 RX ORDER — TRANEXAMIC ACID 100 MG/ML
INJECTION, SOLUTION INTRAVENOUS AS NEEDED
Status: DISCONTINUED | OUTPATIENT
Start: 2020-12-03 | End: 2020-12-03 | Stop reason: SURG

## 2020-12-03 RX ORDER — LIDOCAINE HYDROCHLORIDE 20 MG/ML
INJECTION, SOLUTION INFILTRATION; PERINEURAL AS NEEDED
Status: DISCONTINUED | OUTPATIENT
Start: 2020-12-03 | End: 2020-12-03 | Stop reason: SURG

## 2020-12-03 RX ADMIN — FENTANYL CITRATE 25 MCG: 50 INJECTION INTRAMUSCULAR; INTRAVENOUS at 18:53

## 2020-12-03 RX ADMIN — ROCURONIUM BROMIDE 10 MG: 10 INJECTION INTRAVENOUS at 16:20

## 2020-12-03 RX ADMIN — PHENYLEPHRINE HYDROCHLORIDE 100 MCG: 10 INJECTION INTRAVENOUS at 17:47

## 2020-12-03 RX ADMIN — CEFAZOLIN SODIUM 2 G: 2 INJECTION, SOLUTION INTRAVENOUS at 23:01

## 2020-12-03 RX ADMIN — ASPIRIN 81 MG: 81 TABLET, COATED ORAL at 23:01

## 2020-12-03 RX ADMIN — MUPIROCIN 1 APPLICATION: 20 OINTMENT TOPICAL at 13:48

## 2020-12-03 RX ADMIN — SODIUM CHLORIDE, POTASSIUM CHLORIDE, SODIUM LACTATE AND CALCIUM CHLORIDE 9 ML/HR: 600; 310; 30; 20 INJECTION, SOLUTION INTRAVENOUS at 13:42

## 2020-12-03 RX ADMIN — SODIUM CHLORIDE 100 ML/HR: 9 INJECTION, SOLUTION INTRAVENOUS at 05:41

## 2020-12-03 RX ADMIN — PHENYLEPHRINE HYDROCHLORIDE 200 MCG: 10 INJECTION INTRAVENOUS at 16:01

## 2020-12-03 RX ADMIN — OXYCODONE HYDROCHLORIDE 20 MG: 10 TABLET, FILM COATED, EXTENDED RELEASE ORAL at 13:48

## 2020-12-03 RX ADMIN — GLYCOPYRROLATE 0.2 MG: 0.2 INJECTION INTRAMUSCULAR; INTRAVENOUS at 17:57

## 2020-12-03 RX ADMIN — ROCURONIUM BROMIDE 40 MG: 10 INJECTION INTRAVENOUS at 15:49

## 2020-12-03 RX ADMIN — PHENYLEPHRINE HYDROCHLORIDE 200 MCG: 10 INJECTION INTRAVENOUS at 16:52

## 2020-12-03 RX ADMIN — ROCURONIUM BROMIDE 15 MG: 10 INJECTION INTRAVENOUS at 17:40

## 2020-12-03 RX ADMIN — ACETAMINOPHEN 1000 MG: 500 TABLET, FILM COATED ORAL at 13:49

## 2020-12-03 RX ADMIN — FENTANYL CITRATE 50 MCG: 50 INJECTION INTRAMUSCULAR; INTRAVENOUS at 15:46

## 2020-12-03 RX ADMIN — VANCOMYCIN HYDROCHLORIDE 1000 MG: 1 INJECTION, SOLUTION INTRAVENOUS at 13:49

## 2020-12-03 RX ADMIN — GLYCOPYRROLATE 0.4 MG: 0.2 INJECTION INTRAMUSCULAR; INTRAVENOUS at 18:53

## 2020-12-03 RX ADMIN — PHENYLEPHRINE HYDROCHLORIDE 100 MCG: 10 INJECTION INTRAVENOUS at 17:12

## 2020-12-03 RX ADMIN — LIDOCAINE HYDROCHLORIDE 60 MG: 20 INJECTION, SOLUTION INFILTRATION; PERINEURAL at 15:49

## 2020-12-03 RX ADMIN — ONDANSETRON HYDROCHLORIDE 4 MG: 2 SOLUTION INTRAMUSCULAR; INTRAVENOUS at 18:53

## 2020-12-03 RX ADMIN — ALBUMIN HUMAN: 0.05 INJECTION, SOLUTION INTRAVENOUS at 18:03

## 2020-12-03 RX ADMIN — PHENYLEPHRINE HYDROCHLORIDE 200 MCG: 10 INJECTION INTRAVENOUS at 16:17

## 2020-12-03 RX ADMIN — PHENYLEPHRINE HYDROCHLORIDE 200 MCG: 10 INJECTION INTRAVENOUS at 17:27

## 2020-12-03 RX ADMIN — SODIUM CHLORIDE, POTASSIUM CHLORIDE, SODIUM LACTATE AND CALCIUM CHLORIDE: 600; 310; 30; 20 INJECTION, SOLUTION INTRAVENOUS at 17:14

## 2020-12-03 RX ADMIN — PHENYLEPHRINE HYDROCHLORIDE 100 MCG: 10 INJECTION INTRAVENOUS at 16:07

## 2020-12-03 RX ADMIN — SODIUM CHLORIDE, POTASSIUM CHLORIDE, SODIUM LACTATE AND CALCIUM CHLORIDE: 600; 310; 30; 20 INJECTION, SOLUTION INTRAVENOUS at 14:54

## 2020-12-03 RX ADMIN — PROPOFOL 120 MG: 10 INJECTION, EMULSION INTRAVENOUS at 15:49

## 2020-12-03 RX ADMIN — PHENYLEPHRINE HYDROCHLORIDE 200 MCG: 10 INJECTION INTRAVENOUS at 17:55

## 2020-12-03 RX ADMIN — DEXAMETHASONE SODIUM PHOSPHATE 6 MG: 10 INJECTION INTRAMUSCULAR; INTRAVENOUS at 15:55

## 2020-12-03 RX ADMIN — TRANEXAMIC ACID 1000 MG: 100 INJECTION, SOLUTION INTRAVENOUS at 16:20

## 2020-12-03 RX ADMIN — ROCURONIUM BROMIDE 5 MG: 10 INJECTION INTRAVENOUS at 17:14

## 2020-12-03 RX ADMIN — PHENYLEPHRINE HYDROCHLORIDE 0.4 MCG/KG/MIN: 10 INJECTION, SOLUTION INTRAMUSCULAR; INTRAVENOUS; SUBCUTANEOUS at 17:53

## 2020-12-03 RX ADMIN — LIDOCAINE HYDROCHLORIDE 1 EACH: 40 SOLUTION TOPICAL at 15:51

## 2020-12-03 RX ADMIN — NEOSTIGMINE METHYLSULFATE 2 MG: 1 INJECTION INTRAMUSCULAR; INTRAVENOUS; SUBCUTANEOUS at 18:53

## 2020-12-03 RX ADMIN — PHENYLEPHRINE HYDROCHLORIDE 100 MCG: 10 INJECTION INTRAVENOUS at 15:49

## 2020-12-03 RX ADMIN — PHENYLEPHRINE HYDROCHLORIDE 200 MCG: 10 INJECTION INTRAVENOUS at 17:32

## 2020-12-03 RX ADMIN — PHENYLEPHRINE HYDROCHLORIDE 100 MCG: 10 INJECTION INTRAVENOUS at 15:57

## 2020-12-03 RX ADMIN — PHENYLEPHRINE HYDROCHLORIDE 200 MCG: 10 INJECTION INTRAVENOUS at 16:42

## 2020-12-03 RX ADMIN — FENTANYL CITRATE 25 MCG: 50 INJECTION INTRAMUSCULAR; INTRAVENOUS at 16:37

## 2020-12-03 RX ADMIN — SODIUM CHLORIDE 100 ML/HR: 9 INJECTION, SOLUTION INTRAVENOUS at 22:00

## 2020-12-03 RX ADMIN — CEFAZOLIN SODIUM 2 G: 2 INJECTION, SOLUTION INTRAVENOUS at 15:56

## 2020-12-03 NOTE — ANESTHESIA PREPROCEDURE EVALUATION
Anesthesia Evaluation     Patient summary reviewed and Nursing notes reviewed   no history of anesthetic complications:  NPO Solid Status: > 8 hours  NPO Liquid Status: > 2 hours           Airway   Mallampati: II  TM distance: >3 FB  Neck ROM: full  No difficulty expected  Dental - normal exam     Pulmonary     breath sounds clear to auscultation  Cardiovascular   Exercise tolerance: good (4-7 METS)    ECG reviewed  Rhythm: regular  Rate: normal        Neuro/Psych  GI/Hepatic/Renal/Endo    (+) obesity,       Musculoskeletal         ROS comment: Periprosthetic hip fracture  Abdominal    Substance History      OB/GYN          Other                        Anesthesia Plan    ASA 2     general     intravenous induction     Anesthetic plan, all risks, benefits, and alternatives have been provided, discussed and informed consent has been obtained with: patient.    Plan discussed with CRNA.

## 2020-12-03 NOTE — ANESTHESIA PROCEDURE NOTES
Airway  Urgency: elective    Date/Time: 12/3/2020 3:51 PM  Airway not difficult    General Information and Staff    Patient location during procedure: OR  Anesthesiologist: Jaime Figueroa MD    Indications and Patient Condition  Indications for airway management: airway protection    Preoxygenated: yes  Mask difficulty assessment: 1 - vent by mask    Final Airway Details  Final airway type: endotracheal airway      Successful airway: ETT  Cuffed: yes   Successful intubation technique: direct laryngoscopy  Endotracheal tube insertion site: oral  Blade: Dexter  Blade size: 3  ETT size (mm): 7.0  Cormack-Lehane Classification: grade IIa - partial view of glottis  Placement verified by: chest auscultation and capnometry   Measured from: lips  ETT/EBT  to lips (cm): 22  Number of attempts at approach: 1  Assessment: lips, teeth, and gum same as pre-op and atraumatic intubation

## 2020-12-04 LAB
ANION GAP SERPL CALCULATED.3IONS-SCNC: 10.1 MMOL/L (ref 5–15)
BASOPHILS # BLD AUTO: 0.03 10*3/MM3 (ref 0–0.2)
BASOPHILS NFR BLD AUTO: 0.4 % (ref 0–1.5)
BUN SERPL-MCNC: 12 MG/DL (ref 8–23)
BUN/CREAT SERPL: 22.2 (ref 7–25)
CALCIUM SPEC-SCNC: 8 MG/DL (ref 8.6–10.5)
CHLORIDE SERPL-SCNC: 103 MMOL/L (ref 98–107)
CO2 SERPL-SCNC: 22.9 MMOL/L (ref 22–29)
CREAT SERPL-MCNC: 0.54 MG/DL (ref 0.57–1)
DEPRECATED RDW RBC AUTO: 43.7 FL (ref 37–54)
EOSINOPHIL # BLD AUTO: 0.03 10*3/MM3 (ref 0–0.4)
EOSINOPHIL NFR BLD AUTO: 0.4 % (ref 0.3–6.2)
ERYTHROCYTE [DISTWIDTH] IN BLOOD BY AUTOMATED COUNT: 12.3 % (ref 12.3–15.4)
GFR SERPL CREATININE-BSD FRML MDRD: 108 ML/MIN/1.73
GLUCOSE SERPL-MCNC: 118 MG/DL (ref 65–99)
HCT VFR BLD AUTO: 26.3 % (ref 34–46.6)
HGB BLD-MCNC: 8.7 G/DL (ref 12–15.9)
IMM GRANULOCYTES # BLD AUTO: 0.03 10*3/MM3 (ref 0–0.05)
IMM GRANULOCYTES NFR BLD AUTO: 0.4 % (ref 0–0.5)
LYMPHOCYTES # BLD AUTO: 0.9 10*3/MM3 (ref 0.7–3.1)
LYMPHOCYTES NFR BLD AUTO: 10.6 % (ref 19.6–45.3)
MCH RBC QN AUTO: 32.5 PG (ref 26.6–33)
MCHC RBC AUTO-ENTMCNC: 33.1 G/DL (ref 31.5–35.7)
MCV RBC AUTO: 98.1 FL (ref 79–97)
MONOCYTES # BLD AUTO: 0.77 10*3/MM3 (ref 0.1–0.9)
MONOCYTES NFR BLD AUTO: 9.1 % (ref 5–12)
NEUTROPHILS NFR BLD AUTO: 6.74 10*3/MM3 (ref 1.7–7)
NEUTROPHILS NFR BLD AUTO: 79.1 % (ref 42.7–76)
NRBC BLD AUTO-RTO: 0 /100 WBC (ref 0–0.2)
PLATELET # BLD AUTO: 179 10*3/MM3 (ref 140–450)
PMV BLD AUTO: 9.5 FL (ref 6–12)
POTASSIUM SERPL-SCNC: 4.4 MMOL/L (ref 3.5–5.2)
RBC # BLD AUTO: 2.68 10*6/MM3 (ref 3.77–5.28)
SODIUM SERPL-SCNC: 136 MMOL/L (ref 136–145)
WBC # BLD AUTO: 8.5 10*3/MM3 (ref 3.4–10.8)

## 2020-12-04 PROCEDURE — 85025 COMPLETE CBC W/AUTO DIFF WBC: CPT | Performed by: ORTHOPAEDIC SURGERY

## 2020-12-04 PROCEDURE — 97161 PT EVAL LOW COMPLEX 20 MIN: CPT

## 2020-12-04 PROCEDURE — 80048 BASIC METABOLIC PNL TOTAL CA: CPT | Performed by: ORTHOPAEDIC SURGERY

## 2020-12-04 PROCEDURE — 97530 THERAPEUTIC ACTIVITIES: CPT

## 2020-12-04 PROCEDURE — 97110 THERAPEUTIC EXERCISES: CPT

## 2020-12-04 PROCEDURE — 25010000003 CEFAZOLIN IN DEXTROSE 2-4 GM/100ML-% SOLUTION: Performed by: ORTHOPAEDIC SURGERY

## 2020-12-04 RX ORDER — ASPIRIN 325 MG
325 TABLET, DELAYED RELEASE (ENTERIC COATED) ORAL EVERY 12 HOURS SCHEDULED
Status: DISCONTINUED | OUTPATIENT
Start: 2020-12-04 | End: 2020-12-08 | Stop reason: HOSPADM

## 2020-12-04 RX ADMIN — CEFAZOLIN SODIUM 2 G: 2 INJECTION, SOLUTION INTRAVENOUS at 06:39

## 2020-12-04 RX ADMIN — ASPIRIN 81 MG: 81 TABLET, COATED ORAL at 08:24

## 2020-12-04 RX ADMIN — ACETAMINOPHEN 650 MG: 325 TABLET, FILM COATED ORAL at 14:29

## 2020-12-04 RX ADMIN — SODIUM CHLORIDE, PRESERVATIVE FREE 3 ML: 5 INJECTION INTRAVENOUS at 22:30

## 2020-12-04 RX ADMIN — HYDROCODONE BITARTRATE AND ACETAMINOPHEN 1 TABLET: 7.5; 325 TABLET ORAL at 08:24

## 2020-12-04 RX ADMIN — POLYETHYLENE GLYCOL 3350 17 G: 17 POWDER, FOR SOLUTION ORAL at 08:24

## 2020-12-04 RX ADMIN — ASPIRIN 325 MG: 325 TABLET, COATED ORAL at 22:36

## 2020-12-04 NOTE — ANESTHESIA POSTPROCEDURE EVALUATION
"Patient: Carla Campos    Procedure Summary     Date: 12/03/20 Room / Location: [facility] OR [address] [place] [address] MAIN OR    Anesthesia Start: 1540 Anesthesia Stop: 1920    Procedure: CONVERSION FAILED RIGHT HIP FRACTURE FIXATION TO A COMPLEX RIGHT TOTAL HIP ARTHROPLASTY (Right Hip) Diagnosis:       Periprosthetic fracture around internal prosthetic right hip joint, initial encounter (CMS/[facility])      Closed intertrochanteric fracture of hip, right, initial encounter (CMS/[facility])      (Periprosthetic fracture around internal prosthetic right hip joint, initial encounter (CMS/[facility]) [M97.01XA])      (Closed intertrochanteric fracture of hip, right, initial encounter (CMS/[facility]) [S72.141A])    Surgeon: Zabrina Andrews MD Provider: Jaime Figueroa MD    Anesthesia Type: general ASA Status: 2          Anesthesia Type: general    Vitals  Vitals Value Taken Time   /68 12/03/20 1935   Temp 36.6 °C (97.8 °F) 12/03/20 1918   Pulse 71 12/03/20 1945   Resp 18 12/03/20 1935   SpO2 100 % 12/03/20 1945   Vitals shown include unvalidated device data.        Post Anesthesia Care and Evaluation    Patient location during evaluation: bedside  Patient participation: complete - patient participated  Level of consciousness: awake and alert  Pain management: adequate  Airway patency: patent  Anesthetic complications: No anesthetic complications    Cardiovascular status: acceptable  Respiratory status: acceptable  Hydration status: acceptable    Comments: /68   Pulse 80   Temp 36.6 °C (97.8 °F) (Oral)   Resp 18   Ht 157.5 cm (62\")   Wt 82.1 kg (181 lb)   SpO2 100%   BMI 33.11 kg/m²       "

## 2020-12-05 LAB
BASOPHILS # BLD AUTO: 0.07 10*3/MM3 (ref 0–0.2)
BASOPHILS NFR BLD AUTO: 0.7 % (ref 0–1.5)
DEPRECATED RDW RBC AUTO: 41.3 FL (ref 37–54)
EOSINOPHIL # BLD AUTO: 0.28 10*3/MM3 (ref 0–0.4)
EOSINOPHIL NFR BLD AUTO: 2.9 % (ref 0.3–6.2)
ERYTHROCYTE [DISTWIDTH] IN BLOOD BY AUTOMATED COUNT: 12.2 % (ref 12.3–15.4)
HCT VFR BLD AUTO: 24.1 % (ref 34–46.6)
HGB BLD-MCNC: 8 G/DL (ref 12–15.9)
HGB BLD-MCNC: 8.8 G/DL (ref 12–15.9)
IMM GRANULOCYTES # BLD AUTO: 0.04 10*3/MM3 (ref 0–0.05)
IMM GRANULOCYTES NFR BLD AUTO: 0.4 % (ref 0–0.5)
LYMPHOCYTES # BLD AUTO: 1.05 10*3/MM3 (ref 0.7–3.1)
LYMPHOCYTES NFR BLD AUTO: 11 % (ref 19.6–45.3)
MCH RBC QN AUTO: 31.3 PG (ref 26.6–33)
MCHC RBC AUTO-ENTMCNC: 33.2 G/DL (ref 31.5–35.7)
MCV RBC AUTO: 94.1 FL (ref 79–97)
MONOCYTES # BLD AUTO: 0.93 10*3/MM3 (ref 0.1–0.9)
MONOCYTES NFR BLD AUTO: 9.7 % (ref 5–12)
NEUTROPHILS NFR BLD AUTO: 7.19 10*3/MM3 (ref 1.7–7)
NEUTROPHILS NFR BLD AUTO: 75.3 % (ref 42.7–76)
NRBC BLD AUTO-RTO: 0 /100 WBC (ref 0–0.2)
PLATELET # BLD AUTO: 195 10*3/MM3 (ref 140–450)
PMV BLD AUTO: 9.8 FL (ref 6–12)
RBC # BLD AUTO: 2.56 10*6/MM3 (ref 3.77–5.28)
WBC # BLD AUTO: 9.56 10*3/MM3 (ref 3.4–10.8)

## 2020-12-05 PROCEDURE — 85018 HEMOGLOBIN: CPT | Performed by: INTERNAL MEDICINE

## 2020-12-05 PROCEDURE — 97110 THERAPEUTIC EXERCISES: CPT

## 2020-12-05 PROCEDURE — 85025 COMPLETE CBC W/AUTO DIFF WBC: CPT | Performed by: ORTHOPAEDIC SURGERY

## 2020-12-05 RX ORDER — FERROUS SULFATE 325(65) MG
325 TABLET ORAL
Status: DISCONTINUED | OUTPATIENT
Start: 2020-12-05 | End: 2020-12-07

## 2020-12-05 RX ADMIN — HYDROCODONE BITARTRATE AND ACETAMINOPHEN 1 TABLET: 7.5; 325 TABLET ORAL at 04:25

## 2020-12-05 RX ADMIN — HYDROCODONE BITARTRATE AND ACETAMINOPHEN 1 TABLET: 7.5; 325 TABLET ORAL at 08:48

## 2020-12-05 RX ADMIN — HYDROCODONE BITARTRATE AND ACETAMINOPHEN 1 TABLET: 7.5; 325 TABLET ORAL at 12:47

## 2020-12-05 RX ADMIN — HYDROCODONE BITARTRATE AND ACETAMINOPHEN 1 TABLET: 7.5; 325 TABLET ORAL at 20:57

## 2020-12-05 RX ADMIN — POLYETHYLENE GLYCOL 3350 17 G: 17 POWDER, FOR SOLUTION ORAL at 08:48

## 2020-12-05 RX ADMIN — ASPIRIN 325 MG: 325 TABLET, COATED ORAL at 20:57

## 2020-12-05 RX ADMIN — FERROUS SULFATE TAB 325 MG (65 MG ELEMENTAL FE) 325 MG: 325 (65 FE) TAB at 14:22

## 2020-12-05 RX ADMIN — SODIUM CHLORIDE, PRESERVATIVE FREE 3 ML: 5 INJECTION INTRAVENOUS at 20:58

## 2020-12-05 RX ADMIN — ASPIRIN 325 MG: 325 TABLET, COATED ORAL at 08:48

## 2020-12-05 RX ADMIN — HYDROCODONE BITARTRATE AND ACETAMINOPHEN 1 TABLET: 7.5; 325 TABLET ORAL at 16:54

## 2020-12-06 LAB
ANION GAP SERPL CALCULATED.3IONS-SCNC: 7.5 MMOL/L (ref 5–15)
BACTERIA SPEC AEROBE CULT: NORMAL
BASOPHILS # BLD AUTO: 0.09 10*3/MM3 (ref 0–0.2)
BASOPHILS NFR BLD AUTO: 1.1 % (ref 0–1.5)
BUN SERPL-MCNC: 12 MG/DL (ref 8–23)
BUN/CREAT SERPL: 22.6 (ref 7–25)
CALCIUM SPEC-SCNC: 8 MG/DL (ref 8.6–10.5)
CHLORIDE SERPL-SCNC: 103 MMOL/L (ref 98–107)
CO2 SERPL-SCNC: 23.5 MMOL/L (ref 22–29)
CREAT SERPL-MCNC: 0.53 MG/DL (ref 0.57–1)
DEPRECATED RDW RBC AUTO: 43.7 FL (ref 37–54)
EOSINOPHIL # BLD AUTO: 0.67 10*3/MM3 (ref 0–0.4)
EOSINOPHIL NFR BLD AUTO: 7.9 % (ref 0.3–6.2)
ERYTHROCYTE [DISTWIDTH] IN BLOOD BY AUTOMATED COUNT: 12.3 % (ref 12.3–15.4)
GFR SERPL CREATININE-BSD FRML MDRD: 110 ML/MIN/1.73
GLUCOSE SERPL-MCNC: 100 MG/DL (ref 65–99)
GRAM STN SPEC: NORMAL
GRAM STN SPEC: NORMAL
HCT VFR BLD AUTO: 22.6 % (ref 34–46.6)
HGB BLD-MCNC: 7.4 G/DL (ref 12–15.9)
IMM GRANULOCYTES # BLD AUTO: 0.03 10*3/MM3 (ref 0–0.05)
IMM GRANULOCYTES NFR BLD AUTO: 0.4 % (ref 0–0.5)
IRON 24H UR-MRATE: 12 MCG/DL (ref 37–145)
IRON SATN MFR SERPL: 7 % (ref 20–50)
LYMPHOCYTES # BLD AUTO: 1.37 10*3/MM3 (ref 0.7–3.1)
LYMPHOCYTES NFR BLD AUTO: 16.2 % (ref 19.6–45.3)
MCH RBC QN AUTO: 31.9 PG (ref 26.6–33)
MCHC RBC AUTO-ENTMCNC: 32.7 G/DL (ref 31.5–35.7)
MCV RBC AUTO: 97.4 FL (ref 79–97)
MONOCYTES # BLD AUTO: 0.88 10*3/MM3 (ref 0.1–0.9)
MONOCYTES NFR BLD AUTO: 10.4 % (ref 5–12)
NEUTROPHILS NFR BLD AUTO: 5.41 10*3/MM3 (ref 1.7–7)
NEUTROPHILS NFR BLD AUTO: 64 % (ref 42.7–76)
NRBC BLD AUTO-RTO: 0 /100 WBC (ref 0–0.2)
PLATELET # BLD AUTO: 188 10*3/MM3 (ref 140–450)
PMV BLD AUTO: 9.7 FL (ref 6–12)
POTASSIUM SERPL-SCNC: 4.3 MMOL/L (ref 3.5–5.2)
RBC # BLD AUTO: 2.32 10*6/MM3 (ref 3.77–5.28)
SODIUM SERPL-SCNC: 134 MMOL/L (ref 136–145)
TIBC SERPL-MCNC: 180 MCG/DL (ref 298–536)
TRANSFERRIN SERPL-MCNC: 121 MG/DL (ref 200–360)
WBC # BLD AUTO: 8.45 10*3/MM3 (ref 3.4–10.8)

## 2020-12-06 PROCEDURE — 84466 ASSAY OF TRANSFERRIN: CPT | Performed by: INTERNAL MEDICINE

## 2020-12-06 PROCEDURE — 83540 ASSAY OF IRON: CPT | Performed by: INTERNAL MEDICINE

## 2020-12-06 PROCEDURE — 80048 BASIC METABOLIC PNL TOTAL CA: CPT | Performed by: INTERNAL MEDICINE

## 2020-12-06 PROCEDURE — 25010000002 IRON SUCROSE PER 1 MG: Performed by: INTERNAL MEDICINE

## 2020-12-06 PROCEDURE — 85025 COMPLETE CBC W/AUTO DIFF WBC: CPT | Performed by: ORTHOPAEDIC SURGERY

## 2020-12-06 RX ORDER — POLYETHYLENE GLYCOL 3350 17 G/17G
17 POWDER, FOR SOLUTION ORAL DAILY
Qty: 30 PACKET | Refills: 0 | Status: SHIPPED | OUTPATIENT
Start: 2020-12-06

## 2020-12-06 RX ORDER — HYDROCODONE BITARTRATE AND ACETAMINOPHEN 7.5; 325 MG/1; MG/1
1 TABLET ORAL EVERY 4 HOURS PRN
Qty: 18 TABLET | Refills: 0 | Status: SHIPPED | OUTPATIENT
Start: 2020-12-06 | End: 2020-12-09

## 2020-12-06 RX ORDER — FERROUS SULFATE 325(65) MG
325 TABLET ORAL
Qty: 29 TABLET | Refills: 0 | Status: SHIPPED | OUTPATIENT
Start: 2020-12-07 | End: 2021-01-05

## 2020-12-06 RX ADMIN — HYDROCODONE BITARTRATE AND ACETAMINOPHEN 1 TABLET: 7.5; 325 TABLET ORAL at 23:58

## 2020-12-06 RX ADMIN — ASPIRIN 325 MG: 325 TABLET, COATED ORAL at 07:56

## 2020-12-06 RX ADMIN — SODIUM CHLORIDE, PRESERVATIVE FREE 3 ML: 5 INJECTION INTRAVENOUS at 07:57

## 2020-12-06 RX ADMIN — HYDROCODONE BITARTRATE AND ACETAMINOPHEN 1 TABLET: 7.5; 325 TABLET ORAL at 18:03

## 2020-12-06 RX ADMIN — SODIUM CHLORIDE, PRESERVATIVE FREE 3 ML: 5 INJECTION INTRAVENOUS at 21:07

## 2020-12-06 RX ADMIN — POLYETHYLENE GLYCOL 3350 17 G: 17 POWDER, FOR SOLUTION ORAL at 07:56

## 2020-12-06 RX ADMIN — HYDROCODONE BITARTRATE AND ACETAMINOPHEN 1 TABLET: 7.5; 325 TABLET ORAL at 14:04

## 2020-12-06 RX ADMIN — HYDROCODONE BITARTRATE AND ACETAMINOPHEN 1 TABLET: 7.5; 325 TABLET ORAL at 05:16

## 2020-12-06 RX ADMIN — ASPIRIN 325 MG: 325 TABLET, COATED ORAL at 21:06

## 2020-12-06 RX ADMIN — IRON SUCROSE 200 MG: 20 INJECTION, SOLUTION INTRAVENOUS at 13:22

## 2020-12-06 RX ADMIN — HYDROCODONE BITARTRATE AND ACETAMINOPHEN 1 TABLET: 7.5; 325 TABLET ORAL at 09:34

## 2020-12-06 RX ADMIN — FERROUS SULFATE TAB 325 MG (65 MG ELEMENTAL FE) 325 MG: 325 (65 FE) TAB at 07:56

## 2020-12-06 RX ADMIN — HYDROCODONE BITARTRATE AND ACETAMINOPHEN 1 TABLET: 7.5; 325 TABLET ORAL at 00:58

## 2020-12-07 LAB
ANION GAP SERPL CALCULATED.3IONS-SCNC: 9.4 MMOL/L (ref 5–15)
BUN SERPL-MCNC: 13 MG/DL (ref 8–23)
BUN/CREAT SERPL: 16.9 (ref 7–25)
CALCIUM SPEC-SCNC: 8.3 MG/DL (ref 8.6–10.5)
CHLORIDE SERPL-SCNC: 101 MMOL/L (ref 98–107)
CO2 SERPL-SCNC: 23.6 MMOL/L (ref 22–29)
CREAT SERPL-MCNC: 0.77 MG/DL (ref 0.57–1)
DEPRECATED RDW RBC AUTO: 44.5 FL (ref 37–54)
ERYTHROCYTE [DISTWIDTH] IN BLOOD BY AUTOMATED COUNT: 12.6 % (ref 12.3–15.4)
GFR SERPL CREATININE-BSD FRML MDRD: 72 ML/MIN/1.73
GLUCOSE SERPL-MCNC: 108 MG/DL (ref 65–99)
HCT VFR BLD AUTO: 24.8 % (ref 34–46.6)
HGB BLD-MCNC: 7.8 G/DL (ref 12–15.9)
MCH RBC QN AUTO: 30.8 PG (ref 26.6–33)
MCHC RBC AUTO-ENTMCNC: 31.5 G/DL (ref 31.5–35.7)
MCV RBC AUTO: 98 FL (ref 79–97)
PLATELET # BLD AUTO: 311 10*3/MM3 (ref 140–450)
PMV BLD AUTO: 9.4 FL (ref 6–12)
POTASSIUM SERPL-SCNC: 4.4 MMOL/L (ref 3.5–5.2)
RBC # BLD AUTO: 2.53 10*6/MM3 (ref 3.77–5.28)
SODIUM SERPL-SCNC: 134 MMOL/L (ref 136–145)
WBC # BLD AUTO: 7.44 10*3/MM3 (ref 3.4–10.8)

## 2020-12-07 PROCEDURE — 25010000002 IRON SUCROSE PER 1 MG: Performed by: INTERNAL MEDICINE

## 2020-12-07 PROCEDURE — 85027 COMPLETE CBC AUTOMATED: CPT | Performed by: NURSE PRACTITIONER

## 2020-12-07 PROCEDURE — 97110 THERAPEUTIC EXERCISES: CPT

## 2020-12-07 PROCEDURE — 80048 BASIC METABOLIC PNL TOTAL CA: CPT | Performed by: NURSE PRACTITIONER

## 2020-12-07 RX ORDER — ACETAMINOPHEN 325 MG/1
650 TABLET ORAL DAILY
Status: DISCONTINUED | OUTPATIENT
Start: 2020-12-07 | End: 2020-12-08 | Stop reason: HOSPADM

## 2020-12-07 RX ORDER — DIPHENHYDRAMINE HCL 50 MG
50 CAPSULE ORAL DAILY
Status: DISCONTINUED | OUTPATIENT
Start: 2020-12-07 | End: 2020-12-08 | Stop reason: HOSPADM

## 2020-12-07 RX ADMIN — HYDROCODONE BITARTRATE AND ACETAMINOPHEN 1 TABLET: 7.5; 325 TABLET ORAL at 15:02

## 2020-12-07 RX ADMIN — HYDROCODONE BITARTRATE AND ACETAMINOPHEN 1 TABLET: 7.5; 325 TABLET ORAL at 06:08

## 2020-12-07 RX ADMIN — ASPIRIN 325 MG: 325 TABLET, COATED ORAL at 09:49

## 2020-12-07 RX ADMIN — HYDROCODONE BITARTRATE AND ACETAMINOPHEN 1 TABLET: 7.5; 325 TABLET ORAL at 20:46

## 2020-12-07 RX ADMIN — SODIUM CHLORIDE, PRESERVATIVE FREE 3 ML: 5 INJECTION INTRAVENOUS at 09:59

## 2020-12-07 RX ADMIN — SODIUM CHLORIDE, PRESERVATIVE FREE 3 ML: 5 INJECTION INTRAVENOUS at 20:48

## 2020-12-07 RX ADMIN — ASPIRIN 325 MG: 325 TABLET, COATED ORAL at 20:46

## 2020-12-07 RX ADMIN — HYDROCODONE BITARTRATE AND ACETAMINOPHEN 1 TABLET: 7.5; 325 TABLET ORAL at 09:56

## 2020-12-07 RX ADMIN — FERROUS SULFATE TAB 325 MG (65 MG ELEMENTAL FE) 325 MG: 325 (65 FE) TAB at 09:50

## 2020-12-07 RX ADMIN — POLYETHYLENE GLYCOL 3350 17 G: 17 POWDER, FOR SOLUTION ORAL at 09:49

## 2020-12-07 RX ADMIN — IRON SUCROSE 200 MG: 20 INJECTION, SOLUTION INTRAVENOUS at 09:58

## 2020-12-08 VITALS
HEART RATE: 79 BPM | WEIGHT: 181 LBS | OXYGEN SATURATION: 94 % | BODY MASS INDEX: 33.31 KG/M2 | SYSTOLIC BLOOD PRESSURE: 128 MMHG | DIASTOLIC BLOOD PRESSURE: 73 MMHG | RESPIRATION RATE: 16 BRPM | HEIGHT: 62 IN | TEMPERATURE: 97.8 F

## 2020-12-08 LAB
ANION GAP SERPL CALCULATED.3IONS-SCNC: 9.8 MMOL/L (ref 5–15)
BACTERIA SPEC ANAEROBE CULT: NORMAL
BUN SERPL-MCNC: 10 MG/DL (ref 8–23)
BUN/CREAT SERPL: 19.2 (ref 7–25)
CALCIUM SPEC-SCNC: 8.2 MG/DL (ref 8.6–10.5)
CHLORIDE SERPL-SCNC: 99 MMOL/L (ref 98–107)
CO2 SERPL-SCNC: 24.2 MMOL/L (ref 22–29)
CREAT SERPL-MCNC: 0.52 MG/DL (ref 0.57–1)
DEPRECATED RDW RBC AUTO: 43.6 FL (ref 37–54)
ERYTHROCYTE [DISTWIDTH] IN BLOOD BY AUTOMATED COUNT: 12.3 % (ref 12.3–15.4)
GFR SERPL CREATININE-BSD FRML MDRD: 113 ML/MIN/1.73
GLUCOSE SERPL-MCNC: 92 MG/DL (ref 65–99)
HCT VFR BLD AUTO: 24.5 % (ref 34–46.6)
HGB BLD-MCNC: 7.8 G/DL (ref 12–15.9)
MCH RBC QN AUTO: 30.7 PG (ref 26.6–33)
MCHC RBC AUTO-ENTMCNC: 31.8 G/DL (ref 31.5–35.7)
MCV RBC AUTO: 96.5 FL (ref 79–97)
PLATELET # BLD AUTO: 322 10*3/MM3 (ref 140–450)
PMV BLD AUTO: 9.4 FL (ref 6–12)
POTASSIUM SERPL-SCNC: 3.9 MMOL/L (ref 3.5–5.2)
RBC # BLD AUTO: 2.54 10*6/MM3 (ref 3.77–5.28)
SODIUM SERPL-SCNC: 133 MMOL/L (ref 136–145)
WBC # BLD AUTO: 8.43 10*3/MM3 (ref 3.4–10.8)

## 2020-12-08 PROCEDURE — 25010000002 IRON SUCROSE PER 1 MG: Performed by: NURSE PRACTITIONER

## 2020-12-08 PROCEDURE — 80048 BASIC METABOLIC PNL TOTAL CA: CPT | Performed by: NURSE PRACTITIONER

## 2020-12-08 PROCEDURE — 85027 COMPLETE CBC AUTOMATED: CPT | Performed by: NURSE PRACTITIONER

## 2020-12-08 PROCEDURE — 63710000001 DIPHENHYDRAMINE PER 50 MG: Performed by: NURSE PRACTITIONER

## 2020-12-08 PROCEDURE — 97110 THERAPEUTIC EXERCISES: CPT

## 2020-12-08 RX ADMIN — POLYETHYLENE GLYCOL 3350 17 G: 17 POWDER, FOR SOLUTION ORAL at 08:49

## 2020-12-08 RX ADMIN — IRON SUCROSE 200 MG: 20 INJECTION, SOLUTION INTRAVENOUS at 05:29

## 2020-12-08 RX ADMIN — SODIUM CHLORIDE, PRESERVATIVE FREE 3 ML: 5 INJECTION INTRAVENOUS at 08:51

## 2020-12-08 RX ADMIN — ASPIRIN 325 MG: 325 TABLET, COATED ORAL at 08:49

## 2020-12-08 RX ADMIN — ACETAMINOPHEN 650 MG: 325 TABLET, FILM COATED ORAL at 05:27

## 2020-12-08 RX ADMIN — DIPHENHYDRAMINE HYDROCHLORIDE 50 MG: 50 CAPSULE ORAL at 05:28

## 2020-12-08 RX ADMIN — HYDROCODONE BITARTRATE AND ACETAMINOPHEN 1 TABLET: 7.5; 325 TABLET ORAL at 11:10

## 2020-12-09 ENCOUNTER — READMISSION MANAGEMENT (OUTPATIENT)
Dept: CALL CENTER | Facility: HOSPITAL | Age: 85
End: 2020-12-09

## 2020-12-09 NOTE — OUTREACH NOTE
Prep Survey      Responses   [facility] patient discharged from?  [place]   Is LACE score < 7 ?  No   Eligibility  Readm Mgmt   Discharge diagnosis  Periprosthetic fracture around internal prosthetic right hip joint  [s/p complete right total hip arthroplasty]   Does the patient have one of the following disease processes/diagnoses(primary or secondary)?  Total Joint Replacement   Does the patient have Home health ordered?  Yes   What is the Home health agency?   [facility]   Is there a DME ordered?  No   Comments regarding appointments  Needs f/u scheduled   Medication alerts for this patient  start aspirin   Prep survey completed?  Yes          Alison Morales RN

## 2020-12-11 ENCOUNTER — HOSPITAL ENCOUNTER (INPATIENT)
Facility: HOSPITAL | Age: 85
LOS: 5 days | Discharge: HOME HEALTH | End: 2020-12-17
Attending: EMERGENCY MEDICINE | Admitting: HOSPITALIST

## 2020-12-11 ENCOUNTER — APPOINTMENT (OUTPATIENT)
Dept: CARDIOLOGY | Facility: HOSPITAL | Age: 85
End: 2020-12-11

## 2020-12-11 ENCOUNTER — APPOINTMENT (OUTPATIENT)
Dept: GENERAL RADIOLOGY | Facility: HOSPITAL | Age: 85
End: 2020-12-11

## 2020-12-11 ENCOUNTER — READMISSION MANAGEMENT (OUTPATIENT)
Dept: CALL CENTER | Facility: HOSPITAL | Age: 85
End: 2020-12-11

## 2020-12-11 DIAGNOSIS — D64.9 CHRONIC ANEMIA: ICD-10-CM

## 2020-12-11 DIAGNOSIS — S72.334A CLOSED NONDISPLACED OBLIQUE FRACTURE OF SHAFT OF RIGHT FEMUR, INITIAL ENCOUNTER: Primary | ICD-10-CM

## 2020-12-11 DIAGNOSIS — M97.01XA PERIPROSTHETIC FRACTURE AROUND INTERNAL PROSTHETIC RIGHT HIP JOINT, INITIAL ENCOUNTER: ICD-10-CM

## 2020-12-11 DIAGNOSIS — M79.604 RIGHT LEG PAIN: ICD-10-CM

## 2020-12-11 PROBLEM — D50.9 IRON DEFICIENCY ANEMIA: Status: ACTIVE | Noted: 2020-12-11

## 2020-12-11 LAB
ABO GROUP BLD: NORMAL
ALBUMIN SERPL-MCNC: 3.1 G/DL (ref 3.5–5.2)
ALBUMIN/GLOB SERPL: 1 G/DL
ALP SERPL-CCNC: 90 U/L (ref 39–117)
ALT SERPL W P-5'-P-CCNC: 17 U/L (ref 1–33)
ANION GAP SERPL CALCULATED.3IONS-SCNC: 5.6 MMOL/L (ref 5–15)
AORTIC DIMENSIONLESS INDEX: 0.7 (DI)
APTT PPP: 44.7 SECONDS (ref 22.7–35.4)
AST SERPL-CCNC: 25 U/L (ref 1–32)
B PARAPERT DNA SPEC QL NAA+PROBE: NOT DETECTED
B PERT DNA SPEC QL NAA+PROBE: NOT DETECTED
BACTERIA UR QL AUTO: ABNORMAL /HPF
BASOPHILS # BLD AUTO: 0.11 10*3/MM3 (ref 0–0.2)
BASOPHILS NFR BLD AUTO: 1.3 % (ref 0–1.5)
BH CV ECHO MEAS - ACS: 2 CM
BH CV ECHO MEAS - AO ARCH DIAM (PROXIMAL TRANS.): 2.7 CM
BH CV ECHO MEAS - AO MAX PG (FULL): 3.3 MMHG
BH CV ECHO MEAS - AO MAX PG: 13.7 MMHG
BH CV ECHO MEAS - AO MEAN PG (FULL): 3 MMHG
BH CV ECHO MEAS - AO MEAN PG: 7 MMHG
BH CV ECHO MEAS - AO V2 MAX: 185 CM/SEC
BH CV ECHO MEAS - AO V2 MEAN: 120 CM/SEC
BH CV ECHO MEAS - AO V2 VTI: 34.1 CM
BH CV ECHO MEAS - AVA(I,A): 2.3 CM^2
BH CV ECHO MEAS - AVA(I,D): 2.3 CM^2
BH CV ECHO MEAS - AVA(V,A): 2.7 CM^2
BH CV ECHO MEAS - AVA(V,D): 2.7 CM^2
BH CV ECHO MEAS - BSA(HAYCOCK): 2 M^2
BH CV ECHO MEAS - BSA: 1.9 M^2
BH CV ECHO MEAS - BZI_BMI: 29.2 KILOGRAMS/M^2
BH CV ECHO MEAS - BZI_METRIC_HEIGHT: 167.6 CM
BH CV ECHO MEAS - BZI_METRIC_WEIGHT: 82.1 KG
BH CV ECHO MEAS - EDV(CUBED): 39.3 ML
BH CV ECHO MEAS - EDV(MOD-SP2): 66 ML
BH CV ECHO MEAS - EDV(MOD-SP4): 66 ML
BH CV ECHO MEAS - EDV(TEICH): 47.4 ML
BH CV ECHO MEAS - EF(CUBED): 76.4 %
BH CV ECHO MEAS - EF(MOD-BP): 66.7 %
BH CV ECHO MEAS - EF(MOD-SP2): 68.2 %
BH CV ECHO MEAS - EF(MOD-SP4): 63.6 %
BH CV ECHO MEAS - EF(TEICH): 69.6 %
BH CV ECHO MEAS - ESV(CUBED): 9.3 ML
BH CV ECHO MEAS - ESV(MOD-SP2): 21 ML
BH CV ECHO MEAS - ESV(MOD-SP4): 24 ML
BH CV ECHO MEAS - ESV(TEICH): 14.4 ML
BH CV ECHO MEAS - FS: 38.2 %
BH CV ECHO MEAS - IVS/LVPW: 0.89
BH CV ECHO MEAS - IVSD: 0.8 CM
BH CV ECHO MEAS - LAT PEAK E' VEL: 11.7 CM/SEC
BH CV ECHO MEAS - LV DIASTOLIC VOL/BSA (35-75): 34.4 ML/M^2
BH CV ECHO MEAS - LV MASS(C)D: 78.3 GRAMS
BH CV ECHO MEAS - LV MASS(C)DI: 40.8 GRAMS/M^2
BH CV ECHO MEAS - LV MAX PG: 10.4 MMHG
BH CV ECHO MEAS - LV MEAN PG: 4 MMHG
BH CV ECHO MEAS - LV SYSTOLIC VOL/BSA (12-30): 12.5 ML/M^2
BH CV ECHO MEAS - LV V1 MAX: 161 CM/SEC
BH CV ECHO MEAS - LV V1 MEAN: 96.2 CM/SEC
BH CV ECHO MEAS - LV V1 VTI: 25.4 CM
BH CV ECHO MEAS - LVIDD: 3.4 CM
BH CV ECHO MEAS - LVIDS: 2.1 CM
BH CV ECHO MEAS - LVLD AP2: 7.8 CM
BH CV ECHO MEAS - LVLD AP4: 7.4 CM
BH CV ECHO MEAS - LVLS AP2: 6.1 CM
BH CV ECHO MEAS - LVLS AP4: 6 CM
BH CV ECHO MEAS - LVOT AREA (M): 3.1 CM^2
BH CV ECHO MEAS - LVOT AREA: 3.1 CM^2
BH CV ECHO MEAS - LVOT DIAM: 2 CM
BH CV ECHO MEAS - LVPWD: 0.9 CM
BH CV ECHO MEAS - MED PEAK E' VEL: 10.2 CM/SEC
BH CV ECHO MEAS - MR MAX PG: 75.3 MMHG
BH CV ECHO MEAS - MR MAX VEL: 434 CM/SEC
BH CV ECHO MEAS - MV A DUR: 0.11 SEC
BH CV ECHO MEAS - MV A MAX VEL: 119 CM/SEC
BH CV ECHO MEAS - MV DEC SLOPE: 514 CM/SEC^2
BH CV ECHO MEAS - MV DEC TIME: 240 SEC
BH CV ECHO MEAS - MV E MAX VEL: 102 CM/SEC
BH CV ECHO MEAS - MV E/A: 0.86
BH CV ECHO MEAS - MV MAX PG: 7.3 MMHG
BH CV ECHO MEAS - MV MEAN PG: 4 MMHG
BH CV ECHO MEAS - MV P1/2T MAX VEL: 110 CM/SEC
BH CV ECHO MEAS - MV P1/2T: 62.7 MSEC
BH CV ECHO MEAS - MV V2 MAX: 135 CM/SEC
BH CV ECHO MEAS - MV V2 MEAN: 96.1 CM/SEC
BH CV ECHO MEAS - MV V2 VTI: 28.7 CM
BH CV ECHO MEAS - MVA P1/2T LCG: 2 CM^2
BH CV ECHO MEAS - MVA(P1/2T): 3.5 CM^2
BH CV ECHO MEAS - MVA(VTI): 2.8 CM^2
BH CV ECHO MEAS - PA ACC TIME: 0.1 SEC
BH CV ECHO MEAS - PA MAX PG (FULL): 3.1 MMHG
BH CV ECHO MEAS - PA MAX PG: 3.6 MMHG
BH CV ECHO MEAS - PA PR(ACCEL): 32.7 MMHG
BH CV ECHO MEAS - PA V2 MAX: 95 CM/SEC
BH CV ECHO MEAS - PULM A REVS DUR: 0.14 SEC
BH CV ECHO MEAS - PULM A REVS VEL: 49.9 CM/SEC
BH CV ECHO MEAS - PULM DIAS VEL: 42 CM/SEC
BH CV ECHO MEAS - PULM S/D: 1.2
BH CV ECHO MEAS - PULM SYS VEL: 51.4 CM/SEC
BH CV ECHO MEAS - PVA(V,A): 1.4 CM^2
BH CV ECHO MEAS - PVA(V,D): 1.4 CM^2
BH CV ECHO MEAS - QP/QS: 0.36
BH CV ECHO MEAS - RAP SYSTOLE: 8 MMHG
BH CV ECHO MEAS - RV MAX PG: 0.48 MMHG
BH CV ECHO MEAS - RV MEAN PG: 0 MMHG
BH CV ECHO MEAS - RV V1 MAX: 34.7 CM/SEC
BH CV ECHO MEAS - RV V1 MEAN: 22.1 CM/SEC
BH CV ECHO MEAS - RV V1 VTI: 7.5 CM
BH CV ECHO MEAS - RVOT AREA: 3.8 CM^2
BH CV ECHO MEAS - RVOT DIAM: 2.2 CM
BH CV ECHO MEAS - RVSP: 36.3 MMHG
BH CV ECHO MEAS - SI(CUBED): 15.7 ML/M^2
BH CV ECHO MEAS - SI(LVOT): 41.6 ML/M^2
BH CV ECHO MEAS - SI(MOD-SP2): 23.5 ML/M^2
BH CV ECHO MEAS - SI(MOD-SP4): 21.9 ML/M^2
BH CV ECHO MEAS - SI(TEICH): 17.2 ML/M^2
BH CV ECHO MEAS - SV(CUBED): 30 ML
BH CV ECHO MEAS - SV(LVOT): 79.8 ML
BH CV ECHO MEAS - SV(MOD-SP2): 45 ML
BH CV ECHO MEAS - SV(MOD-SP4): 42 ML
BH CV ECHO MEAS - SV(RVOT): 28.4 ML
BH CV ECHO MEAS - SV(TEICH): 33 ML
BH CV ECHO MEAS - TAPSE (>1.6): 2.6 CM
BH CV ECHO MEAS - TR MAX VEL: 266 CM/SEC
BH CV ECHO MEASUREMENTS AVERAGE E/E' RATIO: 9.32
BH CV VAS BP RIGHT ARM: NORMAL MMHG
BH CV XLRA - RV BASE: 3.5 CM
BH CV XLRA - RV LENGTH: 7 CM
BH CV XLRA - RV MID: 1.6 CM
BH CV XLRA - TDI S': 13.8 CM/SEC
BILIRUB SERPL-MCNC: 0.2 MG/DL (ref 0–1.2)
BILIRUB UR QL STRIP: NEGATIVE
BLD GP AB SCN SERPL QL: NEGATIVE
BUN SERPL-MCNC: 11 MG/DL (ref 8–23)
BUN/CREAT SERPL: 20 (ref 7–25)
C PNEUM DNA NPH QL NAA+NON-PROBE: NOT DETECTED
CALCIUM SPEC-SCNC: 8.7 MG/DL (ref 8.6–10.5)
CHLORIDE SERPL-SCNC: 104 MMOL/L (ref 98–107)
CLARITY UR: CLEAR
CO2 SERPL-SCNC: 28.4 MMOL/L (ref 22–29)
COLOR UR: YELLOW
CREAT SERPL-MCNC: 0.55 MG/DL (ref 0.57–1)
DEPRECATED RDW RBC AUTO: 46.4 FL (ref 37–54)
EOSINOPHIL # BLD AUTO: 0.53 10*3/MM3 (ref 0–0.4)
EOSINOPHIL NFR BLD AUTO: 6.2 % (ref 0.3–6.2)
ERYTHROCYTE [DISTWIDTH] IN BLOOD BY AUTOMATED COUNT: 12.9 % (ref 12.3–15.4)
FLUAV SUBTYP SPEC NAA+PROBE: NOT DETECTED
FLUBV RNA ISLT QL NAA+PROBE: NOT DETECTED
FOLATE SERPL-MCNC: >20 NG/ML (ref 4.78–24.2)
GFR SERPL CREATININE-BSD FRML MDRD: 106 ML/MIN/1.73
GLOBULIN UR ELPH-MCNC: 3 GM/DL
GLUCOSE SERPL-MCNC: 111 MG/DL (ref 65–99)
GLUCOSE UR STRIP-MCNC: NEGATIVE MG/DL
HADV DNA SPEC NAA+PROBE: NOT DETECTED
HCOV 229E RNA SPEC QL NAA+PROBE: NOT DETECTED
HCOV HKU1 RNA SPEC QL NAA+PROBE: NOT DETECTED
HCOV NL63 RNA SPEC QL NAA+PROBE: NOT DETECTED
HCOV OC43 RNA SPEC QL NAA+PROBE: NOT DETECTED
HCT VFR BLD AUTO: 24.9 % (ref 34–46.6)
HGB BLD-MCNC: 7.6 G/DL (ref 12–15.9)
HGB UR QL STRIP.AUTO: NEGATIVE
HMPV RNA NPH QL NAA+NON-PROBE: NOT DETECTED
HPIV1 RNA SPEC QL NAA+PROBE: NOT DETECTED
HPIV2 RNA SPEC QL NAA+PROBE: NOT DETECTED
HPIV3 RNA NPH QL NAA+PROBE: NOT DETECTED
HPIV4 P GENE NPH QL NAA+PROBE: NOT DETECTED
HYALINE CASTS UR QL AUTO: ABNORMAL /LPF
IMM GRANULOCYTES # BLD AUTO: 0.07 10*3/MM3 (ref 0–0.05)
IMM GRANULOCYTES NFR BLD AUTO: 0.8 % (ref 0–0.5)
INR PPP: 1.11 (ref 0.9–1.1)
IRON 24H UR-MRATE: 19 MCG/DL (ref 37–145)
IRON SATN MFR SERPL: 8 % (ref 20–50)
KETONES UR QL STRIP: NEGATIVE
LEFT ATRIUM VOLUME INDEX: 24 ML/M2
LEUKOCYTE ESTERASE UR QL STRIP.AUTO: ABNORMAL
LV EF 2D ECHO EST: 65 %
LYMPHOCYTES # BLD AUTO: 1.13 10*3/MM3 (ref 0.7–3.1)
LYMPHOCYTES NFR BLD AUTO: 13.2 % (ref 19.6–45.3)
M PNEUMO IGG SER IA-ACNC: NOT DETECTED
MAXIMAL PREDICTED HEART RATE: 137 BPM
MCH RBC QN AUTO: 30.3 PG (ref 26.6–33)
MCHC RBC AUTO-ENTMCNC: 30.5 G/DL (ref 31.5–35.7)
MCV RBC AUTO: 99.2 FL (ref 79–97)
MONOCYTES # BLD AUTO: 0.65 10*3/MM3 (ref 0.1–0.9)
MONOCYTES NFR BLD AUTO: 7.6 % (ref 5–12)
NEUTROPHILS NFR BLD AUTO: 6.08 10*3/MM3 (ref 1.7–7)
NEUTROPHILS NFR BLD AUTO: 70.9 % (ref 42.7–76)
NITRITE UR QL STRIP: NEGATIVE
NRBC BLD AUTO-RTO: 0 /100 WBC (ref 0–0.2)
PH UR STRIP.AUTO: 8 [PH] (ref 5–8)
PLATELET # BLD AUTO: 472 10*3/MM3 (ref 140–450)
PMV BLD AUTO: 8.8 FL (ref 6–12)
POTASSIUM SERPL-SCNC: 4.4 MMOL/L (ref 3.5–5.2)
PROT SERPL-MCNC: 6.1 G/DL (ref 6–8.5)
PROT UR QL STRIP: NEGATIVE
PROTHROMBIN TIME: 14.2 SECONDS (ref 11.7–14.2)
RBC # BLD AUTO: 2.51 10*6/MM3 (ref 3.77–5.28)
RBC # UR: ABNORMAL /HPF
REF LAB TEST METHOD: ABNORMAL
RH BLD: POSITIVE
RHINOVIRUS RNA SPEC NAA+PROBE: NOT DETECTED
RSV RNA NPH QL NAA+NON-PROBE: NOT DETECTED
SARS-COV-2 RNA NPH QL NAA+NON-PROBE: NOT DETECTED
SODIUM SERPL-SCNC: 138 MMOL/L (ref 136–145)
SP GR UR STRIP: 1.01 (ref 1–1.03)
SQUAMOUS #/AREA URNS HPF: ABNORMAL /HPF
STRESS TARGET HR: 116 BPM
T&S EXPIRATION DATE: NORMAL
TIBC SERPL-MCNC: 240 MCG/DL (ref 298–536)
TRANSFERRIN SERPL-MCNC: 161 MG/DL (ref 200–360)
UROBILINOGEN UR QL STRIP: ABNORMAL
VIT B12 BLD-MCNC: 738 PG/ML (ref 211–946)
WBC # BLD AUTO: 8.57 10*3/MM3 (ref 3.4–10.8)
WBC UR QL AUTO: ABNORMAL /HPF

## 2020-12-11 PROCEDURE — 86850 RBC ANTIBODY SCREEN: CPT | Performed by: NURSE PRACTITIONER

## 2020-12-11 PROCEDURE — 83540 ASSAY OF IRON: CPT | Performed by: NURSE PRACTITIONER

## 2020-12-11 PROCEDURE — P9016 RBC LEUKOCYTES REDUCED: HCPCS

## 2020-12-11 PROCEDURE — 93306 TTE W/DOPPLER COMPLETE: CPT | Performed by: INTERNAL MEDICINE

## 2020-12-11 PROCEDURE — 80053 COMPREHEN METABOLIC PANEL: CPT | Performed by: NURSE PRACTITIONER

## 2020-12-11 PROCEDURE — G0378 HOSPITAL OBSERVATION PER HR: HCPCS

## 2020-12-11 PROCEDURE — 84466 ASSAY OF TRANSFERRIN: CPT | Performed by: NURSE PRACTITIONER

## 2020-12-11 PROCEDURE — 36415 COLL VENOUS BLD VENIPUNCTURE: CPT | Performed by: NURSE PRACTITIONER

## 2020-12-11 PROCEDURE — 73502 X-RAY EXAM HIP UNI 2-3 VIEWS: CPT

## 2020-12-11 PROCEDURE — 82607 VITAMIN B-12: CPT | Performed by: NURSE PRACTITIONER

## 2020-12-11 PROCEDURE — 25010000002 HYDROMORPHONE PER 4 MG: Performed by: NURSE PRACTITIONER

## 2020-12-11 PROCEDURE — 73552 X-RAY EXAM OF FEMUR 2/>: CPT

## 2020-12-11 PROCEDURE — 93010 ELECTROCARDIOGRAM REPORT: CPT | Performed by: INTERNAL MEDICINE

## 2020-12-11 PROCEDURE — 36430 TRANSFUSION BLD/BLD COMPNT: CPT

## 2020-12-11 PROCEDURE — 93306 TTE W/DOPPLER COMPLETE: CPT

## 2020-12-11 PROCEDURE — 85025 COMPLETE CBC W/AUTO DIFF WBC: CPT | Performed by: NURSE PRACTITIONER

## 2020-12-11 PROCEDURE — 86900 BLOOD TYPING SEROLOGIC ABO: CPT

## 2020-12-11 PROCEDURE — 81001 URINALYSIS AUTO W/SCOPE: CPT | Performed by: NURSE PRACTITIONER

## 2020-12-11 PROCEDURE — 86923 COMPATIBILITY TEST ELECTRIC: CPT

## 2020-12-11 PROCEDURE — 85730 THROMBOPLASTIN TIME PARTIAL: CPT | Performed by: NURSE PRACTITIONER

## 2020-12-11 PROCEDURE — 86900 BLOOD TYPING SEROLOGIC ABO: CPT | Performed by: NURSE PRACTITIONER

## 2020-12-11 PROCEDURE — 86901 BLOOD TYPING SEROLOGIC RH(D): CPT | Performed by: NURSE PRACTITIONER

## 2020-12-11 PROCEDURE — 93005 ELECTROCARDIOGRAM TRACING: CPT | Performed by: NURSE PRACTITIONER

## 2020-12-11 PROCEDURE — 82746 ASSAY OF FOLIC ACID SERUM: CPT | Performed by: NURSE PRACTITIONER

## 2020-12-11 PROCEDURE — 0202U NFCT DS 22 TRGT SARS-COV-2: CPT | Performed by: NURSE PRACTITIONER

## 2020-12-11 PROCEDURE — 85610 PROTHROMBIN TIME: CPT | Performed by: NURSE PRACTITIONER

## 2020-12-11 PROCEDURE — 99285 EMERGENCY DEPT VISIT HI MDM: CPT

## 2020-12-11 PROCEDURE — 71045 X-RAY EXAM CHEST 1 VIEW: CPT

## 2020-12-11 PROCEDURE — 25010000002 ONDANSETRON PER 1 MG: Performed by: NURSE PRACTITIONER

## 2020-12-11 RX ORDER — NALOXONE HCL 0.4 MG/ML
0.4 VIAL (ML) INJECTION
Status: DISCONTINUED | OUTPATIENT
Start: 2020-12-11 | End: 2020-12-12

## 2020-12-11 RX ORDER — LORAZEPAM 0.5 MG/1
0.5 TABLET ORAL EVERY 6 HOURS PRN
Status: DISCONTINUED | OUTPATIENT
Start: 2020-12-11 | End: 2020-12-17 | Stop reason: HOSPADM

## 2020-12-11 RX ORDER — UREA 10 %
3 LOTION (ML) TOPICAL NIGHTLY PRN
Status: DISCONTINUED | OUTPATIENT
Start: 2020-12-11 | End: 2020-12-11 | Stop reason: SDUPTHER

## 2020-12-11 RX ORDER — ONDANSETRON 2 MG/ML
4 INJECTION INTRAMUSCULAR; INTRAVENOUS ONCE
Status: COMPLETED | OUTPATIENT
Start: 2020-12-11 | End: 2020-12-11

## 2020-12-11 RX ORDER — ONDANSETRON 2 MG/ML
4 INJECTION INTRAMUSCULAR; INTRAVENOUS EVERY 6 HOURS PRN
Status: DISCONTINUED | OUTPATIENT
Start: 2020-12-11 | End: 2020-12-17 | Stop reason: HOSPADM

## 2020-12-11 RX ORDER — MORPHINE SULFATE 2 MG/ML
1 INJECTION, SOLUTION INTRAMUSCULAR; INTRAVENOUS EVERY 4 HOURS PRN
Status: DISCONTINUED | OUTPATIENT
Start: 2020-12-11 | End: 2020-12-12

## 2020-12-11 RX ORDER — SODIUM CHLORIDE 0.9 % (FLUSH) 0.9 %
10 SYRINGE (ML) INJECTION AS NEEDED
Status: DISCONTINUED | OUTPATIENT
Start: 2020-12-11 | End: 2020-12-17 | Stop reason: HOSPADM

## 2020-12-11 RX ORDER — UREA 10 %
3 LOTION (ML) TOPICAL NIGHTLY PRN
Status: DISCONTINUED | OUTPATIENT
Start: 2020-12-11 | End: 2020-12-17 | Stop reason: HOSPADM

## 2020-12-11 RX ORDER — SODIUM CHLORIDE 0.9 % (FLUSH) 0.9 %
10 SYRINGE (ML) INJECTION EVERY 12 HOURS SCHEDULED
Status: DISCONTINUED | OUTPATIENT
Start: 2020-12-11 | End: 2020-12-17 | Stop reason: HOSPADM

## 2020-12-11 RX ORDER — HYDROMORPHONE HYDROCHLORIDE 1 MG/ML
0.25 INJECTION, SOLUTION INTRAMUSCULAR; INTRAVENOUS; SUBCUTANEOUS ONCE
Status: COMPLETED | OUTPATIENT
Start: 2020-12-11 | End: 2020-12-11

## 2020-12-11 RX ORDER — HYDROCODONE BITARTRATE AND ACETAMINOPHEN 7.5; 325 MG/1; MG/1
1 TABLET ORAL EVERY 4 HOURS PRN
Status: DISCONTINUED | OUTPATIENT
Start: 2020-12-11 | End: 2020-12-12

## 2020-12-11 RX ORDER — POLYETHYLENE GLYCOL 3350 17 G/17G
17 POWDER, FOR SOLUTION ORAL DAILY
Status: DISCONTINUED | OUTPATIENT
Start: 2020-12-11 | End: 2020-12-17 | Stop reason: HOSPADM

## 2020-12-11 RX ADMIN — SODIUM CHLORIDE, PRESERVATIVE FREE 10 ML: 5 INJECTION INTRAVENOUS at 15:17

## 2020-12-11 RX ADMIN — HYDROCODONE BITARTRATE AND ACETAMINOPHEN 1 TABLET: 7.5; 325 TABLET ORAL at 15:23

## 2020-12-11 RX ADMIN — HYDROMORPHONE HYDROCHLORIDE 0.25 MG: 1 INJECTION, SOLUTION INTRAMUSCULAR; INTRAVENOUS; SUBCUTANEOUS at 07:15

## 2020-12-11 RX ADMIN — HYDROCODONE BITARTRATE AND ACETAMINOPHEN 1 TABLET: 7.5; 325 TABLET ORAL at 21:55

## 2020-12-11 RX ADMIN — ONDANSETRON 4 MG: 2 INJECTION INTRAMUSCULAR; INTRAVENOUS at 07:15

## 2020-12-11 RX ADMIN — SODIUM CHLORIDE, PRESERVATIVE FREE 10 ML: 5 INJECTION INTRAVENOUS at 21:55

## 2020-12-12 LAB
25(OH)D3 SERPL-MCNC: 24.7 NG/ML (ref 30–100)
ANION GAP SERPL CALCULATED.3IONS-SCNC: 9 MMOL/L (ref 5–15)
BH BB BLOOD EXPIRATION DATE: NORMAL
BH BB BLOOD TYPE BARCODE: 6200
BH BB DISPENSE STATUS: NORMAL
BH BB PRODUCT CODE: NORMAL
BH BB UNIT NUMBER: NORMAL
BUN SERPL-MCNC: 8 MG/DL (ref 8–23)
BUN/CREAT SERPL: 14.8 (ref 7–25)
CALCIUM SPEC-SCNC: 8.5 MG/DL (ref 8.6–10.5)
CHLORIDE SERPL-SCNC: 99 MMOL/L (ref 98–107)
CO2 SERPL-SCNC: 25 MMOL/L (ref 22–29)
CREAT SERPL-MCNC: 0.54 MG/DL (ref 0.57–1)
CROSSMATCH INTERPRETATION: NORMAL
DEPRECATED RDW RBC AUTO: 50.4 FL (ref 37–54)
ERYTHROCYTE [DISTWIDTH] IN BLOOD BY AUTOMATED COUNT: 14.5 % (ref 12.3–15.4)
GFR SERPL CREATININE-BSD FRML MDRD: 108 ML/MIN/1.73
GLUCOSE SERPL-MCNC: 84 MG/DL (ref 65–99)
HCT VFR BLD AUTO: 26.9 % (ref 34–46.6)
HGB BLD-MCNC: 8.7 G/DL (ref 12–15.9)
MCH RBC QN AUTO: 30.7 PG (ref 26.6–33)
MCHC RBC AUTO-ENTMCNC: 32.3 G/DL (ref 31.5–35.7)
MCV RBC AUTO: 95.1 FL (ref 79–97)
PLATELET # BLD AUTO: 503 10*3/MM3 (ref 140–450)
PMV BLD AUTO: 9 FL (ref 6–12)
POTASSIUM SERPL-SCNC: 4.2 MMOL/L (ref 3.5–5.2)
QT INTERVAL: 378 MS
RBC # BLD AUTO: 2.83 10*6/MM3 (ref 3.77–5.28)
SODIUM SERPL-SCNC: 133 MMOL/L (ref 136–145)
UNIT  ABO: NORMAL
UNIT  RH: NORMAL
WBC # BLD AUTO: 9.18 10*3/MM3 (ref 3.4–10.8)

## 2020-12-12 PROCEDURE — 80048 BASIC METABOLIC PNL TOTAL CA: CPT | Performed by: NURSE PRACTITIONER

## 2020-12-12 PROCEDURE — 25010000002 CEFTRIAXONE PER 250 MG: Performed by: NURSE PRACTITIONER

## 2020-12-12 PROCEDURE — 85027 COMPLETE CBC AUTOMATED: CPT | Performed by: NURSE PRACTITIONER

## 2020-12-12 PROCEDURE — 25010000002 MORPHINE PER 10 MG: Performed by: NURSE PRACTITIONER

## 2020-12-12 PROCEDURE — P9016 RBC LEUKOCYTES REDUCED: HCPCS

## 2020-12-12 PROCEDURE — 82306 VITAMIN D 25 HYDROXY: CPT | Performed by: NURSE PRACTITIONER

## 2020-12-12 PROCEDURE — 25010000002 ENOXAPARIN PER 10 MG: Performed by: ORTHOPAEDIC SURGERY

## 2020-12-12 PROCEDURE — 86900 BLOOD TYPING SEROLOGIC ABO: CPT

## 2020-12-12 PROCEDURE — 36430 TRANSFUSION BLD/BLD COMPNT: CPT

## 2020-12-12 RX ORDER — NALOXONE HCL 0.4 MG/ML
0.4 VIAL (ML) INJECTION
Status: DISCONTINUED | OUTPATIENT
Start: 2020-12-12 | End: 2020-12-13

## 2020-12-12 RX ORDER — CEFTRIAXONE SODIUM 1 G/50ML
1 INJECTION, SOLUTION INTRAVENOUS EVERY 24 HOURS
Status: COMPLETED | OUTPATIENT
Start: 2020-12-12 | End: 2020-12-16

## 2020-12-12 RX ORDER — HYDROCODONE BITARTRATE AND ACETAMINOPHEN 10; 325 MG/1; MG/1
1 TABLET ORAL EVERY 4 HOURS PRN
Status: DISCONTINUED | OUTPATIENT
Start: 2020-12-12 | End: 2020-12-17 | Stop reason: HOSPADM

## 2020-12-12 RX ORDER — MORPHINE SULFATE 2 MG/ML
3 INJECTION, SOLUTION INTRAMUSCULAR; INTRAVENOUS EVERY 4 HOURS PRN
Status: DISCONTINUED | OUTPATIENT
Start: 2020-12-12 | End: 2020-12-13

## 2020-12-12 RX ADMIN — HYDROCODONE BITARTRATE AND ACETAMINOPHEN 1 TABLET: 10; 325 TABLET ORAL at 20:29

## 2020-12-12 RX ADMIN — HYDROCODONE BITARTRATE AND ACETAMINOPHEN 1 TABLET: 10; 325 TABLET ORAL at 16:30

## 2020-12-12 RX ADMIN — ENOXAPARIN SODIUM 40 MG: 40 INJECTION SUBCUTANEOUS at 12:07

## 2020-12-12 RX ADMIN — MORPHINE SULFATE 1 MG: 2 INJECTION, SOLUTION INTRAMUSCULAR; INTRAVENOUS at 10:22

## 2020-12-12 RX ADMIN — SODIUM CHLORIDE, PRESERVATIVE FREE 10 ML: 5 INJECTION INTRAVENOUS at 20:08

## 2020-12-12 RX ADMIN — CEFTRIAXONE SODIUM 1 G: 1 INJECTION, SOLUTION INTRAVENOUS at 02:47

## 2020-12-12 RX ADMIN — HYDROCODONE BITARTRATE AND ACETAMINOPHEN 1 TABLET: 10; 325 TABLET ORAL at 12:07

## 2020-12-12 NOTE — OUTREACH NOTE
Total Joint Week 1 Survey      Responses   [facility] patient discharged from?  [place]   Does the patient have one of the following disease processes/diagnoses(primary or secondary)?  Total Joint Replacement   Week 1 attempt successful?  No   Revoke  Readmitted          Kia Toure RN

## 2020-12-13 ENCOUNTER — ANESTHESIA EVENT (OUTPATIENT)
Dept: PERIOP | Facility: HOSPITAL | Age: 85
End: 2020-12-13

## 2020-12-13 ENCOUNTER — APPOINTMENT (OUTPATIENT)
Dept: GENERAL RADIOLOGY | Facility: HOSPITAL | Age: 85
End: 2020-12-13

## 2020-12-13 ENCOUNTER — ANESTHESIA (OUTPATIENT)
Dept: PERIOP | Facility: HOSPITAL | Age: 85
End: 2020-12-13

## 2020-12-13 LAB
ANION GAP SERPL CALCULATED.3IONS-SCNC: 9.5 MMOL/L (ref 5–15)
ANION GAP SERPL CALCULATED.3IONS-SCNC: 9.6 MMOL/L (ref 5–15)
BH BB BLOOD EXPIRATION DATE: NORMAL
BH BB BLOOD EXPIRATION DATE: NORMAL
BH BB BLOOD TYPE BARCODE: 6200
BH BB BLOOD TYPE BARCODE: 6200
BH BB DISPENSE STATUS: NORMAL
BH BB DISPENSE STATUS: NORMAL
BH BB PRODUCT CODE: NORMAL
BH BB PRODUCT CODE: NORMAL
BH BB UNIT NUMBER: NORMAL
BH BB UNIT NUMBER: NORMAL
BUN SERPL-MCNC: 10 MG/DL (ref 8–23)
BUN SERPL-MCNC: 10 MG/DL (ref 8–23)
BUN/CREAT SERPL: 18.2 (ref 7–25)
BUN/CREAT SERPL: 22.2 (ref 7–25)
CALCIUM SPEC-SCNC: 7.2 MG/DL (ref 8.6–10.5)
CALCIUM SPEC-SCNC: 8.6 MG/DL (ref 8.6–10.5)
CHLORIDE SERPL-SCNC: 104 MMOL/L (ref 98–107)
CHLORIDE SERPL-SCNC: 98 MMOL/L (ref 98–107)
CO2 SERPL-SCNC: 21.4 MMOL/L (ref 22–29)
CO2 SERPL-SCNC: 25.5 MMOL/L (ref 22–29)
CREAT SERPL-MCNC: 0.45 MG/DL (ref 0.57–1)
CREAT SERPL-MCNC: 0.55 MG/DL (ref 0.57–1)
CROSSMATCH INTERPRETATION: NORMAL
CROSSMATCH INTERPRETATION: NORMAL
DEPRECATED RDW RBC AUTO: 54.8 FL (ref 37–54)
ERYTHROCYTE [DISTWIDTH] IN BLOOD BY AUTOMATED COUNT: 16 % (ref 12.3–15.4)
GFR SERPL CREATININE-BSD FRML MDRD: 106 ML/MIN/1.73
GFR SERPL CREATININE-BSD FRML MDRD: 133 ML/MIN/1.73
GLUCOSE SERPL-MCNC: 107 MG/DL (ref 65–99)
GLUCOSE SERPL-MCNC: 127 MG/DL (ref 65–99)
HCT VFR BLD AUTO: 33.9 % (ref 34–46.6)
HGB BLD-MCNC: 10.9 G/DL (ref 12–15.9)
MCH RBC QN AUTO: 29.9 PG (ref 26.6–33)
MCHC RBC AUTO-ENTMCNC: 32.2 G/DL (ref 31.5–35.7)
MCV RBC AUTO: 92.9 FL (ref 79–97)
PLATELET # BLD AUTO: 515 10*3/MM3 (ref 140–450)
PMV BLD AUTO: 8.5 FL (ref 6–12)
POTASSIUM SERPL-SCNC: 3.9 MMOL/L (ref 3.5–5.2)
POTASSIUM SERPL-SCNC: 4.1 MMOL/L (ref 3.5–5.2)
RBC # BLD AUTO: 3.65 10*6/MM3 (ref 3.77–5.28)
SODIUM SERPL-SCNC: 133 MMOL/L (ref 136–145)
SODIUM SERPL-SCNC: 135 MMOL/L (ref 136–145)
UNIT  ABO: NORMAL
UNIT  ABO: NORMAL
UNIT  RH: NORMAL
UNIT  RH: NORMAL
WBC # BLD AUTO: 8.91 10*3/MM3 (ref 3.4–10.8)

## 2020-12-13 PROCEDURE — C1776 JOINT DEVICE (IMPLANTABLE): HCPCS | Performed by: ORTHOPAEDIC SURGERY

## 2020-12-13 PROCEDURE — 0SW90JZ REVISION OF SYNTHETIC SUBSTITUTE IN RIGHT HIP JOINT, OPEN APPROACH: ICD-10-PCS | Performed by: ORTHOPAEDIC SURGERY

## 2020-12-13 PROCEDURE — C1713 ANCHOR/SCREW BN/BN,TIS/BN: HCPCS | Performed by: ORTHOPAEDIC SURGERY

## 2020-12-13 PROCEDURE — 25010000003 CEFAZOLIN IN DEXTROSE 2-4 GM/100ML-% SOLUTION: Performed by: ORTHOPAEDIC SURGERY

## 2020-12-13 PROCEDURE — 87015 SPECIMEN INFECT AGNT CONCNTJ: CPT | Performed by: ORTHOPAEDIC SURGERY

## 2020-12-13 PROCEDURE — 25010000002 MIDAZOLAM PER 1 MG: Performed by: ANESTHESIOLOGY

## 2020-12-13 PROCEDURE — 25010000002 HYDROMORPHONE PER 4 MG: Performed by: NURSE ANESTHETIST, CERTIFIED REGISTERED

## 2020-12-13 PROCEDURE — 25010000002 PROPOFOL 10 MG/ML EMULSION: Performed by: NURSE ANESTHETIST, CERTIFIED REGISTERED

## 2020-12-13 PROCEDURE — 87176 TISSUE HOMOGENIZATION CULTR: CPT | Performed by: ORTHOPAEDIC SURGERY

## 2020-12-13 PROCEDURE — C1769 GUIDE WIRE: HCPCS | Performed by: ORTHOPAEDIC SURGERY

## 2020-12-13 PROCEDURE — L1830 KO IMMOB CANVAS LONG PRE OTS: HCPCS | Performed by: ORTHOPAEDIC SURGERY

## 2020-12-13 PROCEDURE — 25010000002 ONDANSETRON PER 1 MG: Performed by: NURSE PRACTITIONER

## 2020-12-13 PROCEDURE — 80048 BASIC METABOLIC PNL TOTAL CA: CPT | Performed by: ORTHOPAEDIC SURGERY

## 2020-12-13 PROCEDURE — 87070 CULTURE OTHR SPECIMN AEROBIC: CPT | Performed by: ORTHOPAEDIC SURGERY

## 2020-12-13 PROCEDURE — 0QS604Z REPOSITION RIGHT UPPER FEMUR WITH INTERNAL FIXATION DEVICE, OPEN APPROACH: ICD-10-PCS | Performed by: ORTHOPAEDIC SURGERY

## 2020-12-13 PROCEDURE — 76000 FLUOROSCOPY <1 HR PHYS/QHP: CPT

## 2020-12-13 PROCEDURE — 25010000002 CEFTRIAXONE PER 250 MG: Performed by: NURSE PRACTITIONER

## 2020-12-13 PROCEDURE — 73501 X-RAY EXAM HIP UNI 1 VIEW: CPT

## 2020-12-13 PROCEDURE — 25010000002 PHENYLEPHRINE PER 1 ML: Performed by: NURSE ANESTHETIST, CERTIFIED REGISTERED

## 2020-12-13 PROCEDURE — 73552 X-RAY EXAM OF FEMUR 2/>: CPT

## 2020-12-13 PROCEDURE — 87205 SMEAR GRAM STAIN: CPT | Performed by: ORTHOPAEDIC SURGERY

## 2020-12-13 PROCEDURE — 85027 COMPLETE CBC AUTOMATED: CPT | Performed by: ORTHOPAEDIC SURGERY

## 2020-12-13 PROCEDURE — 25010000002 VANCOMYCIN 1 G RECONSTITUTED SOLUTION: Performed by: NURSE ANESTHETIST, CERTIFIED REGISTERED

## 2020-12-13 PROCEDURE — 25010000002 FENTANYL CITRATE (PF) 100 MCG/2ML SOLUTION: Performed by: ANESTHESIOLOGY

## 2020-12-13 PROCEDURE — 87075 CULTR BACTERIA EXCEPT BLOOD: CPT | Performed by: ORTHOPAEDIC SURGERY

## 2020-12-13 PROCEDURE — 73502 X-RAY EXAM HIP UNI 2-3 VIEWS: CPT

## 2020-12-13 PROCEDURE — 25010000002 VANCOMYCIN 1 G RECONSTITUTED SOLUTION: Performed by: ORTHOPAEDIC SURGERY

## 2020-12-13 PROCEDURE — 25010000002 FENTANYL CITRATE (PF) 100 MCG/2ML SOLUTION: Performed by: NURSE ANESTHETIST, CERTIFIED REGISTERED

## 2020-12-13 DEVICE — PERIPROSTHETIC LOCKING SCREW
Type: IMPLANTABLE DEVICE | Site: HIP | Status: FUNCTIONAL
Brand: AXSOS

## 2020-12-13 DEVICE — LOCKING SCREW
Type: IMPLANTABLE DEVICE | Site: HIP | Status: FUNCTIONAL
Brand: AXSOS

## 2020-12-13 DEVICE — DISTAL LATERAL FEMUR PLATE
Type: IMPLANTABLE DEVICE | Site: HIP | Status: FUNCTIONAL
Brand: AXSOS

## 2020-12-13 DEVICE — SUT FW #2 W/TPR NDL 1/2 CIR 38IN 97CM 26.5MM BLU: Type: IMPLANTABLE DEVICE | Status: FUNCTIONAL

## 2020-12-13 DEVICE — HD FEM/HIP BIOLOX/DELTA V40 CERAM 28MM: Type: IMPLANTABLE DEVICE | Site: HIP | Status: FUNCTIONAL

## 2020-12-13 DEVICE — K-WIRE DRILL TIP
Type: IMPLANTABLE DEVICE | Site: HIP | Status: FUNCTIONAL
Brand: AXSOS

## 2020-12-13 DEVICE — CABL W/SLV DALLMILES BEAD 2MM: Type: IMPLANTABLE DEVICE | Site: HIP | Status: FUNCTIONAL

## 2020-12-13 DEVICE — DEV CONTRL TISS STRATAFIX PDS PLS OS6 REV SZ1 18IN 45CM: Type: IMPLANTABLE DEVICE | Site: HIP | Status: FUNCTIONAL

## 2020-12-13 DEVICE — INSRT HIP RESTOR ADM X3 28/48MM SZ42E: Type: IMPLANTABLE DEVICE | Site: HIP | Status: FUNCTIONAL

## 2020-12-13 DEVICE — IMPLANTABLE DEVICE: Type: IMPLANTABLE DEVICE | Site: HIP | Status: FUNCTIONAL

## 2020-12-13 RX ORDER — CEFAZOLIN SODIUM 2 G/100ML
2 INJECTION, SOLUTION INTRAVENOUS EVERY 8 HOURS
Status: COMPLETED | OUTPATIENT
Start: 2020-12-13 | End: 2020-12-14

## 2020-12-13 RX ORDER — MAGNESIUM HYDROXIDE 1200 MG/15ML
LIQUID ORAL AS NEEDED
Status: DISCONTINUED | OUTPATIENT
Start: 2020-12-13 | End: 2020-12-13 | Stop reason: HOSPADM

## 2020-12-13 RX ORDER — SODIUM CHLORIDE 0.9 % (FLUSH) 0.9 %
3 SYRINGE (ML) INJECTION EVERY 12 HOURS SCHEDULED
Status: DISCONTINUED | OUTPATIENT
Start: 2020-12-13 | End: 2020-12-17 | Stop reason: HOSPADM

## 2020-12-13 RX ORDER — EPHEDRINE SULFATE 50 MG/ML
INJECTION, SOLUTION INTRAVENOUS AS NEEDED
Status: DISCONTINUED | OUTPATIENT
Start: 2020-12-13 | End: 2020-12-13 | Stop reason: SURG

## 2020-12-13 RX ORDER — ROCURONIUM BROMIDE 10 MG/ML
INJECTION, SOLUTION INTRAVENOUS AS NEEDED
Status: DISCONTINUED | OUTPATIENT
Start: 2020-12-13 | End: 2020-12-13 | Stop reason: SURG

## 2020-12-13 RX ORDER — DIPHENHYDRAMINE HYDROCHLORIDE 50 MG/ML
12.5 INJECTION INTRAMUSCULAR; INTRAVENOUS
Status: DISCONTINUED | OUTPATIENT
Start: 2020-12-13 | End: 2020-12-13 | Stop reason: HOSPADM

## 2020-12-13 RX ORDER — HYDROMORPHONE HCL 110MG/55ML
PATIENT CONTROLLED ANALGESIA SYRINGE INTRAVENOUS AS NEEDED
Status: DISCONTINUED | OUTPATIENT
Start: 2020-12-13 | End: 2020-12-13 | Stop reason: SURG

## 2020-12-13 RX ORDER — LIDOCAINE HYDROCHLORIDE 20 MG/ML
INJECTION, SOLUTION INFILTRATION; PERINEURAL AS NEEDED
Status: DISCONTINUED | OUTPATIENT
Start: 2020-12-13 | End: 2020-12-13 | Stop reason: SURG

## 2020-12-13 RX ORDER — VANCOMYCIN HYDROCHLORIDE 1 G/20ML
INJECTION, POWDER, LYOPHILIZED, FOR SOLUTION INTRAVENOUS AS NEEDED
Status: DISCONTINUED | OUTPATIENT
Start: 2020-12-13 | End: 2020-12-13 | Stop reason: SURG

## 2020-12-13 RX ORDER — PROMETHAZINE HYDROCHLORIDE 25 MG/1
25 TABLET ORAL ONCE AS NEEDED
Status: DISCONTINUED | OUTPATIENT
Start: 2020-12-13 | End: 2020-12-13 | Stop reason: HOSPADM

## 2020-12-13 RX ORDER — TRANEXAMIC ACID 100 MG/ML
INJECTION, SOLUTION INTRAVENOUS AS NEEDED
Status: DISCONTINUED | OUTPATIENT
Start: 2020-12-13 | End: 2020-12-13 | Stop reason: SURG

## 2020-12-13 RX ORDER — BACITRACIN 500 [USP'U]/G
OINTMENT OPHTHALMIC 3 TIMES DAILY
Status: DISCONTINUED | OUTPATIENT
Start: 2020-12-13 | End: 2020-12-17 | Stop reason: HOSPADM

## 2020-12-13 RX ORDER — CEFAZOLIN SODIUM 2 G/100ML
2 INJECTION, SOLUTION INTRAVENOUS ONCE
Status: COMPLETED | OUTPATIENT
Start: 2020-12-13 | End: 2020-12-13

## 2020-12-13 RX ORDER — SODIUM CHLORIDE 0.9 % (FLUSH) 0.9 %
1-10 SYRINGE (ML) INJECTION AS NEEDED
Status: DISCONTINUED | OUTPATIENT
Start: 2020-12-13 | End: 2020-12-17 | Stop reason: HOSPADM

## 2020-12-13 RX ORDER — HYDROMORPHONE HYDROCHLORIDE 1 MG/ML
0.5 INJECTION, SOLUTION INTRAMUSCULAR; INTRAVENOUS; SUBCUTANEOUS
Status: DISCONTINUED | OUTPATIENT
Start: 2020-12-13 | End: 2020-12-13 | Stop reason: HOSPADM

## 2020-12-13 RX ORDER — FENTANYL CITRATE 50 UG/ML
50 INJECTION, SOLUTION INTRAMUSCULAR; INTRAVENOUS
Status: DISCONTINUED | OUTPATIENT
Start: 2020-12-13 | End: 2020-12-13 | Stop reason: HOSPADM

## 2020-12-13 RX ORDER — MIDAZOLAM HYDROCHLORIDE 1 MG/ML
1 INJECTION INTRAMUSCULAR; INTRAVENOUS
Status: DISCONTINUED | OUTPATIENT
Start: 2020-12-13 | End: 2020-12-13 | Stop reason: HOSPADM

## 2020-12-13 RX ORDER — VANCOMYCIN HYDROCHLORIDE 1 G/20ML
INJECTION, POWDER, LYOPHILIZED, FOR SOLUTION INTRAVENOUS AS NEEDED
Status: DISCONTINUED | OUTPATIENT
Start: 2020-12-13 | End: 2020-12-13 | Stop reason: HOSPADM

## 2020-12-13 RX ORDER — NALOXONE HCL 0.4 MG/ML
0.1 VIAL (ML) INJECTION
Status: DISCONTINUED | OUTPATIENT
Start: 2020-12-13 | End: 2020-12-17 | Stop reason: HOSPADM

## 2020-12-13 RX ORDER — DIPHENHYDRAMINE HCL 25 MG
25 CAPSULE ORAL
Status: DISCONTINUED | OUTPATIENT
Start: 2020-12-13 | End: 2020-12-13 | Stop reason: HOSPADM

## 2020-12-13 RX ORDER — DIPHENOXYLATE HYDROCHLORIDE AND ATROPINE SULFATE 2.5; .025 MG/1; MG/1
1 TABLET ORAL NIGHTLY
Status: DISCONTINUED | OUTPATIENT
Start: 2020-12-13 | End: 2020-12-17 | Stop reason: HOSPADM

## 2020-12-13 RX ORDER — PROMETHAZINE HYDROCHLORIDE 25 MG/1
25 SUPPOSITORY RECTAL ONCE AS NEEDED
Status: DISCONTINUED | OUTPATIENT
Start: 2020-12-13 | End: 2020-12-13 | Stop reason: HOSPADM

## 2020-12-13 RX ORDER — FAMOTIDINE 10 MG/ML
20 INJECTION, SOLUTION INTRAVENOUS ONCE
Status: COMPLETED | OUTPATIENT
Start: 2020-12-13 | End: 2020-12-13

## 2020-12-13 RX ORDER — OXYCODONE AND ACETAMINOPHEN 7.5; 325 MG/1; MG/1
1 TABLET ORAL ONCE AS NEEDED
Status: DISCONTINUED | OUTPATIENT
Start: 2020-12-13 | End: 2020-12-13 | Stop reason: HOSPADM

## 2020-12-13 RX ORDER — LIDOCAINE HYDROCHLORIDE 10 MG/ML
0.5 INJECTION, SOLUTION EPIDURAL; INFILTRATION; INTRACAUDAL; PERINEURAL ONCE AS NEEDED
Status: DISCONTINUED | OUTPATIENT
Start: 2020-12-13 | End: 2020-12-13 | Stop reason: HOSPADM

## 2020-12-13 RX ORDER — ACETAMINOPHEN 325 MG/1
325 TABLET ORAL EVERY 4 HOURS PRN
Status: DISCONTINUED | OUTPATIENT
Start: 2020-12-13 | End: 2020-12-17 | Stop reason: HOSPADM

## 2020-12-13 RX ORDER — PROPOFOL 10 MG/ML
VIAL (ML) INTRAVENOUS AS NEEDED
Status: DISCONTINUED | OUTPATIENT
Start: 2020-12-13 | End: 2020-12-13 | Stop reason: SURG

## 2020-12-13 RX ORDER — FLUMAZENIL 0.1 MG/ML
0.2 INJECTION INTRAVENOUS AS NEEDED
Status: DISCONTINUED | OUTPATIENT
Start: 2020-12-13 | End: 2020-12-13 | Stop reason: HOSPADM

## 2020-12-13 RX ORDER — BISACODYL 5 MG/1
10 TABLET, DELAYED RELEASE ORAL DAILY PRN
Status: DISCONTINUED | OUTPATIENT
Start: 2020-12-13 | End: 2020-12-17 | Stop reason: HOSPADM

## 2020-12-13 RX ORDER — ONDANSETRON 2 MG/ML
4 INJECTION INTRAMUSCULAR; INTRAVENOUS ONCE AS NEEDED
Status: DISCONTINUED | OUTPATIENT
Start: 2020-12-13 | End: 2020-12-13 | Stop reason: HOSPADM

## 2020-12-13 RX ORDER — SODIUM CHLORIDE 0.9 % (FLUSH) 0.9 %
3 SYRINGE (ML) INJECTION EVERY 12 HOURS SCHEDULED
Status: DISCONTINUED | OUTPATIENT
Start: 2020-12-13 | End: 2020-12-13 | Stop reason: HOSPADM

## 2020-12-13 RX ORDER — LABETALOL HYDROCHLORIDE 5 MG/ML
5 INJECTION, SOLUTION INTRAVENOUS
Status: DISCONTINUED | OUTPATIENT
Start: 2020-12-13 | End: 2020-12-13 | Stop reason: HOSPADM

## 2020-12-13 RX ORDER — NALOXONE HCL 0.4 MG/ML
0.2 VIAL (ML) INJECTION AS NEEDED
Status: DISCONTINUED | OUTPATIENT
Start: 2020-12-13 | End: 2020-12-13 | Stop reason: HOSPADM

## 2020-12-13 RX ORDER — SODIUM CHLORIDE 0.9 % (FLUSH) 0.9 %
3-10 SYRINGE (ML) INJECTION AS NEEDED
Status: DISCONTINUED | OUTPATIENT
Start: 2020-12-13 | End: 2020-12-13 | Stop reason: HOSPADM

## 2020-12-13 RX ORDER — SODIUM CHLORIDE 9 MG/ML
100 INJECTION, SOLUTION INTRAVENOUS CONTINUOUS
Status: DISCONTINUED | OUTPATIENT
Start: 2020-12-13 | End: 2020-12-14

## 2020-12-13 RX ORDER — DEXAMETHASONE SODIUM PHOSPHATE 10 MG/ML
INJECTION INTRAMUSCULAR; INTRAVENOUS AS NEEDED
Status: DISCONTINUED | OUTPATIENT
Start: 2020-12-13 | End: 2020-12-13

## 2020-12-13 RX ORDER — SODIUM CHLORIDE, SODIUM LACTATE, POTASSIUM CHLORIDE, CALCIUM CHLORIDE 600; 310; 30; 20 MG/100ML; MG/100ML; MG/100ML; MG/100ML
9 INJECTION, SOLUTION INTRAVENOUS CONTINUOUS
Status: DISCONTINUED | OUTPATIENT
Start: 2020-12-13 | End: 2020-12-13

## 2020-12-13 RX ORDER — HYDROCODONE BITARTRATE AND ACETAMINOPHEN 7.5; 325 MG/1; MG/1
1 TABLET ORAL ONCE AS NEEDED
Status: DISCONTINUED | OUTPATIENT
Start: 2020-12-13 | End: 2020-12-13 | Stop reason: HOSPADM

## 2020-12-13 RX ORDER — PROPARACAINE HYDROCHLORIDE 5 MG/ML
2 SOLUTION/ DROPS OPHTHALMIC ONCE
Status: COMPLETED | OUTPATIENT
Start: 2020-12-13 | End: 2020-12-13

## 2020-12-13 RX ORDER — DOCUSATE SODIUM 100 MG/1
100 CAPSULE, LIQUID FILLED ORAL 2 TIMES DAILY PRN
Status: DISCONTINUED | OUTPATIENT
Start: 2020-12-13 | End: 2020-12-17 | Stop reason: HOSPADM

## 2020-12-13 RX ORDER — EPHEDRINE SULFATE 50 MG/ML
5 INJECTION, SOLUTION INTRAVENOUS ONCE AS NEEDED
Status: DISCONTINUED | OUTPATIENT
Start: 2020-12-13 | End: 2020-12-13 | Stop reason: HOSPADM

## 2020-12-13 RX ADMIN — Medication 1 TABLET: at 20:04

## 2020-12-13 RX ADMIN — HYDROCODONE BITARTRATE AND ACETAMINOPHEN 1 TABLET: 10; 325 TABLET ORAL at 00:48

## 2020-12-13 RX ADMIN — CEFAZOLIN SODIUM 2 G: 2 INJECTION, SOLUTION INTRAVENOUS at 12:15

## 2020-12-13 RX ADMIN — HYDROMORPHONE HYDROCHLORIDE 0.5 MG: 2 INJECTION, SOLUTION INTRAMUSCULAR; INTRAVENOUS; SUBCUTANEOUS at 14:31

## 2020-12-13 RX ADMIN — EPHEDRINE SULFATE 10 MG: 50 INJECTION INTRAVENOUS at 08:09

## 2020-12-13 RX ADMIN — PHENYLEPHRINE HYDROCHLORIDE 300 MCG: 10 INJECTION INTRAVENOUS at 12:29

## 2020-12-13 RX ADMIN — EPHEDRINE SULFATE 5 MG: 50 INJECTION INTRAVENOUS at 08:28

## 2020-12-13 RX ADMIN — FENTANYL CITRATE 50 MCG: 50 INJECTION, SOLUTION INTRAMUSCULAR; INTRAVENOUS at 15:14

## 2020-12-13 RX ADMIN — MIDAZOLAM 1 MG: 1 INJECTION INTRAMUSCULAR; INTRAVENOUS at 07:48

## 2020-12-13 RX ADMIN — CEFAZOLIN SODIUM 2 G: 2 INJECTION, SOLUTION INTRAVENOUS at 20:04

## 2020-12-13 RX ADMIN — FAMOTIDINE 20 MG: 10 INJECTION INTRAVENOUS at 07:48

## 2020-12-13 RX ADMIN — HYDROMORPHONE HYDROCHLORIDE 0.5 MG: 1 INJECTION, SOLUTION INTRAMUSCULAR; INTRAVENOUS; SUBCUTANEOUS at 16:58

## 2020-12-13 RX ADMIN — SODIUM CHLORIDE, POTASSIUM CHLORIDE, SODIUM LACTATE AND CALCIUM CHLORIDE: 600; 310; 30; 20 INJECTION, SOLUTION INTRAVENOUS at 10:40

## 2020-12-13 RX ADMIN — BACITRACIN 1 APPLICATION: 500 OINTMENT OPHTHALMIC at 15:34

## 2020-12-13 RX ADMIN — SODIUM CHLORIDE 100 ML/HR: 9 INJECTION, SOLUTION INTRAVENOUS at 18:24

## 2020-12-13 RX ADMIN — PHENYLEPHRINE HYDROCHLORIDE 100 MCG: 10 INJECTION INTRAVENOUS at 10:29

## 2020-12-13 RX ADMIN — PHENYLEPHRINE HYDROCHLORIDE 100 MCG: 10 INJECTION INTRAVENOUS at 10:31

## 2020-12-13 RX ADMIN — SODIUM CHLORIDE, POTASSIUM CHLORIDE, SODIUM LACTATE AND CALCIUM CHLORIDE: 600; 310; 30; 20 INJECTION, SOLUTION INTRAVENOUS at 14:21

## 2020-12-13 RX ADMIN — FENTANYL CITRATE 50 MCG: 50 INJECTION INTRAMUSCULAR; INTRAVENOUS at 08:02

## 2020-12-13 RX ADMIN — PROPARACAINE HYDROCHLORIDE 2 DROP: 5 SOLUTION/ DROPS OPHTHALMIC at 15:35

## 2020-12-13 RX ADMIN — TRANEXAMIC ACID 1000 MG: 100 INJECTION, SOLUTION INTRAVENOUS at 08:48

## 2020-12-13 RX ADMIN — ROCURONIUM BROMIDE 10 MG: 10 INJECTION INTRAVENOUS at 10:41

## 2020-12-13 RX ADMIN — CEFTRIAXONE SODIUM 1 G: 1 INJECTION, SOLUTION INTRAVENOUS at 00:48

## 2020-12-13 RX ADMIN — PHENYLEPHRINE HYDROCHLORIDE 200 MCG: 10 INJECTION INTRAVENOUS at 12:42

## 2020-12-13 RX ADMIN — ROCURONIUM BROMIDE 50 MG: 10 INJECTION INTRAVENOUS at 08:02

## 2020-12-13 RX ADMIN — SUGAMMADEX 300 MG: 100 INJECTION, SOLUTION INTRAVENOUS at 13:44

## 2020-12-13 RX ADMIN — ROCURONIUM BROMIDE 10 MG: 10 INJECTION INTRAVENOUS at 09:19

## 2020-12-13 RX ADMIN — BACITRACIN: 500 OINTMENT OPHTHALMIC at 20:04

## 2020-12-13 RX ADMIN — SODIUM CHLORIDE, POTASSIUM CHLORIDE, SODIUM LACTATE AND CALCIUM CHLORIDE 9 ML/HR: 600; 310; 30; 20 INJECTION, SOLUTION INTRAVENOUS at 07:47

## 2020-12-13 RX ADMIN — HYDROCODONE BITARTRATE AND ACETAMINOPHEN 1 TABLET: 10; 325 TABLET ORAL at 22:25

## 2020-12-13 RX ADMIN — PHENYLEPHRINE HYDROCHLORIDE 100 MCG: 10 INJECTION INTRAVENOUS at 10:23

## 2020-12-13 RX ADMIN — ROCURONIUM BROMIDE 20 MG: 10 INJECTION INTRAVENOUS at 12:36

## 2020-12-13 RX ADMIN — PHENYLEPHRINE HYDROCHLORIDE 100 MCG: 10 INJECTION INTRAVENOUS at 09:54

## 2020-12-13 RX ADMIN — FENTANYL CITRATE 50 MCG: 50 INJECTION, SOLUTION INTRAMUSCULAR; INTRAVENOUS at 15:39

## 2020-12-13 RX ADMIN — ROCURONIUM BROMIDE 10 MG: 10 INJECTION INTRAVENOUS at 11:21

## 2020-12-13 RX ADMIN — FENTANYL CITRATE 50 MCG: 50 INJECTION INTRAMUSCULAR; INTRAVENOUS at 08:47

## 2020-12-13 RX ADMIN — VANCOMYCIN HYDROCHLORIDE 1 G: 1 INJECTION, POWDER, LYOPHILIZED, FOR SOLUTION INTRAVENOUS at 12:36

## 2020-12-13 RX ADMIN — PHENYLEPHRINE HYDROCHLORIDE 50 MCG: 10 INJECTION INTRAVENOUS at 08:09

## 2020-12-13 RX ADMIN — HYDROCODONE BITARTRATE AND ACETAMINOPHEN 1 TABLET: 10; 325 TABLET ORAL at 04:43

## 2020-12-13 RX ADMIN — LIDOCAINE HYDROCHLORIDE 50 MG: 20 INJECTION, SOLUTION INFILTRATION; PERINEURAL at 08:02

## 2020-12-13 RX ADMIN — PROPOFOL 100 MG: 10 INJECTION, EMULSION INTRAVENOUS at 08:02

## 2020-12-13 RX ADMIN — CEFAZOLIN SODIUM 2 G: 2 INJECTION, SOLUTION INTRAVENOUS at 08:09

## 2020-12-13 RX ADMIN — SODIUM CHLORIDE, POTASSIUM CHLORIDE, SODIUM LACTATE AND CALCIUM CHLORIDE: 600; 310; 30; 20 INJECTION, SOLUTION INTRAVENOUS at 12:29

## 2020-12-13 RX ADMIN — ONDANSETRON HYDROCHLORIDE 4 MG: 2 SOLUTION INTRAMUSCULAR; INTRAVENOUS at 13:01

## 2020-12-13 NOTE — ANESTHESIA PROCEDURE NOTES
Airway  Urgency: elective    Date/Time: 12/13/2020 8:05 AM  Airway not difficult    General Information and Staff    Patient location during procedure: OR  CRNA: Brandon Colbert CRNA    Indications and Patient Condition  Indications for airway management: airway protection    Preoxygenated: yes  MILS maintained throughout  Mask difficulty assessment: 1 - vent by mask    Final Airway Details  Final airway type: endotracheal airway      Successful airway: ETT  Cuffed: yes   Successful intubation technique: direct laryngoscopy  Endotracheal tube insertion site: oral  Blade: Dexter  Blade size: 3  ETT size (mm): 7.0  Cormack-Lehane Classification: grade I - full view of glottis  Placement verified by: chest auscultation   Measured from: teeth  ETT/EBT  to teeth (cm): 22  Number of attempts at approach: 1  Assessment: lips, teeth, and gum same as pre-op and atraumatic intubation

## 2020-12-13 NOTE — ANESTHESIA POSTPROCEDURE EVALUATION
"Patient: Carla Campos    Procedure Summary     Date: 12/13/20 Room / Location: [facility] OR [address] [place] [address] MAIN OR    Anesthesia Start: 0759 Anesthesia Stop: 1443    Procedure: OPEN REDUCTION INTERNAL FIXATION RIGHT FEMUR PERIPROSTHETIC FRACTURE, TOTAL HIP ARTHROPLASTY REVISION (Right Hip) Diagnosis:       Periprosthetic fracture around internal prosthetic right hip joint, initial encounter (CMS/[facility])      (Periprosthetic fracture around internal prosthetic right hip joint, initial encounter (CMS/[facility]) [M97.01XA])    Surgeon: Zabrina Andrews MD Provider: Shaw Villalta MD    Anesthesia Type: general ASA Status: 3          Anesthesia Type: general    Vitals  Vitals Value Taken Time   BP 96/65 12/13/20 1530   Temp 36.4 °C (97.5 °F) 12/13/20 1436   Pulse 92 12/13/20 1541   Resp 14 12/13/20 1515   SpO2 100 % 12/13/20 1541   Vitals shown include unvalidated device data.        Post Anesthesia Care and Evaluation    Patient location during evaluation: bedside  Patient participation: complete - patient participated  Level of consciousness: awake and alert  Pain management: adequate  Airway patency: patent  Anesthetic complications: No anesthetic complications    Cardiovascular status: acceptable  Respiratory status: acceptable  Hydration status: acceptable    Comments: /60 (BP Location: Right arm, Patient Position: Lying)   Pulse 88   Temp 36.4 °C (97.5 °F) (Skin)   Resp 14   Ht 167.6 cm (66\")   Wt 82.1 kg (181 lb)   SpO2 100%   BMI 29.21 kg/m²       "

## 2020-12-13 NOTE — ANESTHESIA PREPROCEDURE EVALUATION
Anesthesia Evaluation     Patient summary reviewed and Nursing notes reviewed   no history of anesthetic complications:  NPO Solid Status: > 8 hours  NPO Liquid Status: > 2 hours           Airway   Mallampati: II  TM distance: >3 FB  Neck ROM: full  No difficulty expected  Comment: Intubation on 12/3/20 - grade IIa - partial view of glottis  Dental - normal exam     Pulmonary     breath sounds clear to auscultation  Cardiovascular   Exercise tolerance: good (4-7 METS)    ECG reviewed  Rhythm: regular  Rate: normal    (+) valvular problems/murmurs (mild) MR,       Neuro/Psych  GI/Hepatic/Renal/Endo    (+) obesity,       Musculoskeletal         ROS comment: Periprosthetic hip fracture  Abdominal    Substance History   (+) alcohol use,      OB/GYN          Other                          Anesthesia Plan    ASA 3     general     intravenous induction     Anesthetic plan, all risks, benefits, and alternatives have been provided, discussed and informed consent has been obtained with: patient.    Plan discussed with CRNA.

## 2020-12-14 LAB
ANION GAP SERPL CALCULATED.3IONS-SCNC: 10.3 MMOL/L (ref 5–15)
BASOPHILS # BLD AUTO: 0.08 10*3/MM3 (ref 0–0.2)
BASOPHILS NFR BLD AUTO: 0.6 % (ref 0–1.5)
BUN SERPL-MCNC: 9 MG/DL (ref 8–23)
BUN/CREAT SERPL: 15.3 (ref 7–25)
CALCIUM SPEC-SCNC: 7.6 MG/DL (ref 8.6–10.5)
CHLORIDE SERPL-SCNC: 99 MMOL/L (ref 98–107)
CO2 SERPL-SCNC: 22.7 MMOL/L (ref 22–29)
CREAT SERPL-MCNC: 0.59 MG/DL (ref 0.57–1)
DEPRECATED RDW RBC AUTO: 51.6 FL (ref 37–54)
EOSINOPHIL # BLD AUTO: 0.03 10*3/MM3 (ref 0–0.4)
EOSINOPHIL NFR BLD AUTO: 0.2 % (ref 0.3–6.2)
ERYTHROCYTE [DISTWIDTH] IN BLOOD BY AUTOMATED COUNT: 15.4 % (ref 12.3–15.4)
GFR SERPL CREATININE-BSD FRML MDRD: 97 ML/MIN/1.73
GLUCOSE SERPL-MCNC: 141 MG/DL (ref 65–99)
HCT VFR BLD AUTO: 24.2 % (ref 34–46.6)
HGB BLD-MCNC: 7.9 G/DL (ref 12–15.9)
IMM GRANULOCYTES # BLD AUTO: 0.06 10*3/MM3 (ref 0–0.05)
IMM GRANULOCYTES NFR BLD AUTO: 0.4 % (ref 0–0.5)
LYMPHOCYTES # BLD AUTO: 0.88 10*3/MM3 (ref 0.7–3.1)
LYMPHOCYTES NFR BLD AUTO: 6.3 % (ref 19.6–45.3)
MCH RBC QN AUTO: 29.9 PG (ref 26.6–33)
MCHC RBC AUTO-ENTMCNC: 32.6 G/DL (ref 31.5–35.7)
MCV RBC AUTO: 91.7 FL (ref 79–97)
MONOCYTES # BLD AUTO: 0.81 10*3/MM3 (ref 0.1–0.9)
MONOCYTES NFR BLD AUTO: 5.8 % (ref 5–12)
NEUTROPHILS NFR BLD AUTO: 12.08 10*3/MM3 (ref 1.7–7)
NEUTROPHILS NFR BLD AUTO: 86.7 % (ref 42.7–76)
NRBC BLD AUTO-RTO: 0 /100 WBC (ref 0–0.2)
PLATELET # BLD AUTO: 373 10*3/MM3 (ref 140–450)
PMV BLD AUTO: 9.1 FL (ref 6–12)
POTASSIUM SERPL-SCNC: 3.8 MMOL/L (ref 3.5–5.2)
RBC # BLD AUTO: 2.64 10*6/MM3 (ref 3.77–5.28)
SODIUM SERPL-SCNC: 132 MMOL/L (ref 136–145)
WBC # BLD AUTO: 13.94 10*3/MM3 (ref 3.4–10.8)

## 2020-12-14 PROCEDURE — 25010000002 ENOXAPARIN PER 10 MG: Performed by: ORTHOPAEDIC SURGERY

## 2020-12-14 PROCEDURE — 80048 BASIC METABOLIC PNL TOTAL CA: CPT | Performed by: ORTHOPAEDIC SURGERY

## 2020-12-14 PROCEDURE — 85025 COMPLETE CBC W/AUTO DIFF WBC: CPT | Performed by: ORTHOPAEDIC SURGERY

## 2020-12-14 PROCEDURE — 97162 PT EVAL MOD COMPLEX 30 MIN: CPT

## 2020-12-14 PROCEDURE — 25010000002 CEFTRIAXONE PER 250 MG: Performed by: ORTHOPAEDIC SURGERY

## 2020-12-14 PROCEDURE — 86900 BLOOD TYPING SEROLOGIC ABO: CPT

## 2020-12-14 PROCEDURE — 97530 THERAPEUTIC ACTIVITIES: CPT

## 2020-12-14 PROCEDURE — 25010000003 CEFAZOLIN IN DEXTROSE 2-4 GM/100ML-% SOLUTION: Performed by: ORTHOPAEDIC SURGERY

## 2020-12-14 PROCEDURE — 97110 THERAPEUTIC EXERCISES: CPT

## 2020-12-14 PROCEDURE — 36430 TRANSFUSION BLD/BLD COMPNT: CPT

## 2020-12-14 PROCEDURE — P9016 RBC LEUKOCYTES REDUCED: HCPCS

## 2020-12-14 RX ADMIN — SODIUM CHLORIDE, PRESERVATIVE FREE 10 ML: 5 INJECTION INTRAVENOUS at 21:14

## 2020-12-14 RX ADMIN — HYDROCODONE BITARTRATE AND ACETAMINOPHEN 1 TABLET: 10; 325 TABLET ORAL at 13:55

## 2020-12-14 RX ADMIN — CEFTRIAXONE SODIUM 1 G: 1 INJECTION, SOLUTION INTRAVENOUS at 02:30

## 2020-12-14 RX ADMIN — Medication 1 TABLET: at 21:08

## 2020-12-14 RX ADMIN — POLYETHYLENE GLYCOL 3350 17 G: 17 POWDER, FOR SOLUTION ORAL at 09:09

## 2020-12-14 RX ADMIN — ENOXAPARIN SODIUM 40 MG: 100 INJECTION SUBCUTANEOUS at 09:08

## 2020-12-14 RX ADMIN — SODIUM CHLORIDE, PRESERVATIVE FREE 10 ML: 5 INJECTION INTRAVENOUS at 09:09

## 2020-12-14 RX ADMIN — SODIUM CHLORIDE 100 ML/HR: 9 INJECTION, SOLUTION INTRAVENOUS at 06:24

## 2020-12-14 RX ADMIN — HYDROCODONE BITARTRATE AND ACETAMINOPHEN 1 TABLET: 10; 325 TABLET ORAL at 22:27

## 2020-12-14 RX ADMIN — BACITRACIN: 500 OINTMENT OPHTHALMIC at 09:09

## 2020-12-14 RX ADMIN — BACITRACIN: 500 OINTMENT OPHTHALMIC at 21:08

## 2020-12-14 RX ADMIN — HYDROCODONE BITARTRATE AND ACETAMINOPHEN 1 TABLET: 10; 325 TABLET ORAL at 18:27

## 2020-12-14 RX ADMIN — HYDROCODONE BITARTRATE AND ACETAMINOPHEN 1 TABLET: 10; 325 TABLET ORAL at 09:09

## 2020-12-14 RX ADMIN — SODIUM CHLORIDE, PRESERVATIVE FREE 3 ML: 5 INJECTION INTRAVENOUS at 09:12

## 2020-12-14 RX ADMIN — CEFAZOLIN SODIUM 2 G: 2 INJECTION, SOLUTION INTRAVENOUS at 03:52

## 2020-12-15 LAB
ANION GAP SERPL CALCULATED.3IONS-SCNC: 9.1 MMOL/L (ref 5–15)
BASOPHILS # BLD AUTO: 0.08 10*3/MM3 (ref 0–0.2)
BASOPHILS NFR BLD AUTO: 0.6 % (ref 0–1.5)
BH BB BLOOD EXPIRATION DATE: NORMAL
BH BB BLOOD EXPIRATION DATE: NORMAL
BH BB BLOOD TYPE BARCODE: 6200
BH BB BLOOD TYPE BARCODE: 6200
BH BB DISPENSE STATUS: NORMAL
BH BB DISPENSE STATUS: NORMAL
BH BB PRODUCT CODE: NORMAL
BH BB PRODUCT CODE: NORMAL
BH BB UNIT NUMBER: NORMAL
BH BB UNIT NUMBER: NORMAL
BUN SERPL-MCNC: 11 MG/DL (ref 8–23)
BUN/CREAT SERPL: 18.6 (ref 7–25)
CALCIUM SPEC-SCNC: 7.6 MG/DL (ref 8.6–10.5)
CHLORIDE SERPL-SCNC: 99 MMOL/L (ref 98–107)
CO2 SERPL-SCNC: 23.9 MMOL/L (ref 22–29)
CREAT SERPL-MCNC: 0.59 MG/DL (ref 0.57–1)
CROSSMATCH INTERPRETATION: NORMAL
CROSSMATCH INTERPRETATION: NORMAL
DEPRECATED RDW RBC AUTO: 51.4 FL (ref 37–54)
EOSINOPHIL # BLD AUTO: 0.63 10*3/MM3 (ref 0–0.4)
EOSINOPHIL NFR BLD AUTO: 4.5 % (ref 0.3–6.2)
ERYTHROCYTE [DISTWIDTH] IN BLOOD BY AUTOMATED COUNT: 15.3 % (ref 12.3–15.4)
GFR SERPL CREATININE-BSD FRML MDRD: 97 ML/MIN/1.73
GLUCOSE SERPL-MCNC: 108 MG/DL (ref 65–99)
HCT VFR BLD AUTO: 26.9 % (ref 34–46.6)
HGB BLD-MCNC: 8.9 G/DL (ref 12–15.9)
IMM GRANULOCYTES # BLD AUTO: 0.1 10*3/MM3 (ref 0–0.05)
IMM GRANULOCYTES NFR BLD AUTO: 0.7 % (ref 0–0.5)
LYMPHOCYTES # BLD AUTO: 0.82 10*3/MM3 (ref 0.7–3.1)
LYMPHOCYTES NFR BLD AUTO: 5.9 % (ref 19.6–45.3)
MCH RBC QN AUTO: 30.3 PG (ref 26.6–33)
MCHC RBC AUTO-ENTMCNC: 33.1 G/DL (ref 31.5–35.7)
MCV RBC AUTO: 91.5 FL (ref 79–97)
MONOCYTES # BLD AUTO: 1 10*3/MM3 (ref 0.1–0.9)
MONOCYTES NFR BLD AUTO: 7.2 % (ref 5–12)
NEUTROPHILS NFR BLD AUTO: 11.25 10*3/MM3 (ref 1.7–7)
NEUTROPHILS NFR BLD AUTO: 81.1 % (ref 42.7–76)
NRBC BLD AUTO-RTO: 0 /100 WBC (ref 0–0.2)
OSMOLALITY SERPL: 274 MOSM/KG (ref 280–301)
OSMOLALITY UR: 437 MOSM/KG
PLATELET # BLD AUTO: 328 10*3/MM3 (ref 140–450)
PMV BLD AUTO: 9.1 FL (ref 6–12)
POTASSIUM SERPL-SCNC: 3.7 MMOL/L (ref 3.5–5.2)
RBC # BLD AUTO: 2.94 10*6/MM3 (ref 3.77–5.28)
SODIUM SERPL-SCNC: 132 MMOL/L (ref 136–145)
SODIUM UR-SCNC: 27 MMOL/L
UNIT  ABO: NORMAL
UNIT  ABO: NORMAL
UNIT  RH: NORMAL
UNIT  RH: NORMAL
WBC # BLD AUTO: 13.88 10*3/MM3 (ref 3.4–10.8)

## 2020-12-15 PROCEDURE — 25010000002 CEFTRIAXONE PER 250 MG: Performed by: ORTHOPAEDIC SURGERY

## 2020-12-15 PROCEDURE — 25010000002 ENOXAPARIN PER 10 MG: Performed by: ORTHOPAEDIC SURGERY

## 2020-12-15 PROCEDURE — 97530 THERAPEUTIC ACTIVITIES: CPT

## 2020-12-15 PROCEDURE — 83935 ASSAY OF URINE OSMOLALITY: CPT | Performed by: NURSE PRACTITIONER

## 2020-12-15 PROCEDURE — 97110 THERAPEUTIC EXERCISES: CPT

## 2020-12-15 PROCEDURE — 85025 COMPLETE CBC W/AUTO DIFF WBC: CPT | Performed by: ORTHOPAEDIC SURGERY

## 2020-12-15 PROCEDURE — 84300 ASSAY OF URINE SODIUM: CPT | Performed by: NURSE PRACTITIONER

## 2020-12-15 PROCEDURE — 83930 ASSAY OF BLOOD OSMOLALITY: CPT | Performed by: NURSE PRACTITIONER

## 2020-12-15 PROCEDURE — 80048 BASIC METABOLIC PNL TOTAL CA: CPT | Performed by: ORTHOPAEDIC SURGERY

## 2020-12-15 RX ADMIN — POLYETHYLENE GLYCOL 3350 17 G: 17 POWDER, FOR SOLUTION ORAL at 08:21

## 2020-12-15 RX ADMIN — SODIUM CHLORIDE, PRESERVATIVE FREE 10 ML: 5 INJECTION INTRAVENOUS at 08:25

## 2020-12-15 RX ADMIN — HYDROCODONE BITARTRATE AND ACETAMINOPHEN 1 TABLET: 10; 325 TABLET ORAL at 19:55

## 2020-12-15 RX ADMIN — HYDROCODONE BITARTRATE AND ACETAMINOPHEN 1 TABLET: 10; 325 TABLET ORAL at 15:58

## 2020-12-15 RX ADMIN — SODIUM CHLORIDE, PRESERVATIVE FREE 10 ML: 5 INJECTION INTRAVENOUS at 19:56

## 2020-12-15 RX ADMIN — HYDROCODONE BITARTRATE AND ACETAMINOPHEN 1 TABLET: 10; 325 TABLET ORAL at 08:21

## 2020-12-15 RX ADMIN — CEFTRIAXONE SODIUM 1 G: 1 INJECTION, SOLUTION INTRAVENOUS at 02:35

## 2020-12-15 RX ADMIN — ENOXAPARIN SODIUM 40 MG: 100 INJECTION SUBCUTANEOUS at 08:21

## 2020-12-15 RX ADMIN — Medication 1 TABLET: at 19:55

## 2020-12-15 RX ADMIN — HYDROCODONE BITARTRATE AND ACETAMINOPHEN 1 TABLET: 10; 325 TABLET ORAL at 03:27

## 2020-12-15 RX ADMIN — HYDROCODONE BITARTRATE AND ACETAMINOPHEN 1 TABLET: 10; 325 TABLET ORAL at 12:19

## 2020-12-16 LAB
BACTERIA SPEC AEROBE CULT: NORMAL
BASOPHILS # BLD AUTO: 0.1 10*3/MM3 (ref 0–0.2)
BASOPHILS NFR BLD AUTO: 0.8 % (ref 0–1.5)
DEPRECATED RDW RBC AUTO: 50.2 FL (ref 37–54)
EOSINOPHIL # BLD AUTO: 0.88 10*3/MM3 (ref 0–0.4)
EOSINOPHIL NFR BLD AUTO: 6.9 % (ref 0.3–6.2)
ERYTHROCYTE [DISTWIDTH] IN BLOOD BY AUTOMATED COUNT: 15 % (ref 12.3–15.4)
GRAM STN SPEC: NORMAL
HCT VFR BLD AUTO: 29 % (ref 34–46.6)
HGB BLD-MCNC: 9.5 G/DL (ref 12–15.9)
IMM GRANULOCYTES # BLD AUTO: 0.16 10*3/MM3 (ref 0–0.05)
IMM GRANULOCYTES NFR BLD AUTO: 1.3 % (ref 0–0.5)
LYMPHOCYTES # BLD AUTO: 1.2 10*3/MM3 (ref 0.7–3.1)
LYMPHOCYTES NFR BLD AUTO: 9.4 % (ref 19.6–45.3)
MCH RBC QN AUTO: 30.2 PG (ref 26.6–33)
MCHC RBC AUTO-ENTMCNC: 32.8 G/DL (ref 31.5–35.7)
MCV RBC AUTO: 92.1 FL (ref 79–97)
MONOCYTES # BLD AUTO: 0.91 10*3/MM3 (ref 0.1–0.9)
MONOCYTES NFR BLD AUTO: 7.1 % (ref 5–12)
NEUTROPHILS NFR BLD AUTO: 74.5 % (ref 42.7–76)
NEUTROPHILS NFR BLD AUTO: 9.5 10*3/MM3 (ref 1.7–7)
NRBC BLD AUTO-RTO: 0 /100 WBC (ref 0–0.2)
PLATELET # BLD AUTO: 417 10*3/MM3 (ref 140–450)
PMV BLD AUTO: 9.1 FL (ref 6–12)
RBC # BLD AUTO: 3.15 10*6/MM3 (ref 3.77–5.28)
WBC # BLD AUTO: 12.75 10*3/MM3 (ref 3.4–10.8)

## 2020-12-16 PROCEDURE — 25010000002 ENOXAPARIN PER 10 MG: Performed by: ORTHOPAEDIC SURGERY

## 2020-12-16 PROCEDURE — 97110 THERAPEUTIC EXERCISES: CPT

## 2020-12-16 PROCEDURE — 85025 COMPLETE CBC W/AUTO DIFF WBC: CPT | Performed by: ORTHOPAEDIC SURGERY

## 2020-12-16 PROCEDURE — 25010000002 CEFTRIAXONE PER 250 MG: Performed by: ORTHOPAEDIC SURGERY

## 2020-12-16 PROCEDURE — 97530 THERAPEUTIC ACTIVITIES: CPT

## 2020-12-16 RX ADMIN — HYDROCODONE BITARTRATE AND ACETAMINOPHEN 1 TABLET: 10; 325 TABLET ORAL at 01:49

## 2020-12-16 RX ADMIN — CEFTRIAXONE SODIUM 1 G: 1 INJECTION, SOLUTION INTRAVENOUS at 01:49

## 2020-12-16 RX ADMIN — HYDROCODONE BITARTRATE AND ACETAMINOPHEN 1 TABLET: 10; 325 TABLET ORAL at 06:03

## 2020-12-16 RX ADMIN — HYDROCODONE BITARTRATE AND ACETAMINOPHEN 1 TABLET: 10; 325 TABLET ORAL at 22:24

## 2020-12-16 RX ADMIN — BACITRACIN: 500 OINTMENT OPHTHALMIC at 08:36

## 2020-12-16 RX ADMIN — HYDROCODONE BITARTRATE AND ACETAMINOPHEN 1 TABLET: 10; 325 TABLET ORAL at 14:53

## 2020-12-16 RX ADMIN — BACITRACIN: 500 OINTMENT OPHTHALMIC at 17:22

## 2020-12-16 RX ADMIN — ENOXAPARIN SODIUM 40 MG: 100 INJECTION SUBCUTANEOUS at 08:36

## 2020-12-16 RX ADMIN — Medication 1 TABLET: at 21:18

## 2020-12-16 RX ADMIN — POLYETHYLENE GLYCOL 3350 17 G: 17 POWDER, FOR SOLUTION ORAL at 08:36

## 2020-12-16 RX ADMIN — SODIUM CHLORIDE, PRESERVATIVE FREE 10 ML: 5 INJECTION INTRAVENOUS at 08:36

## 2020-12-16 RX ADMIN — HYDROCODONE BITARTRATE AND ACETAMINOPHEN 1 TABLET: 10; 325 TABLET ORAL at 18:28

## 2020-12-16 RX ADMIN — SODIUM CHLORIDE, PRESERVATIVE FREE 3 ML: 5 INJECTION INTRAVENOUS at 21:18

## 2020-12-16 RX ADMIN — HYDROCODONE BITARTRATE AND ACETAMINOPHEN 1 TABLET: 10; 325 TABLET ORAL at 10:34

## 2020-12-17 VITALS
SYSTOLIC BLOOD PRESSURE: 113 MMHG | TEMPERATURE: 97.3 F | BODY MASS INDEX: 29.09 KG/M2 | HEART RATE: 88 BPM | OXYGEN SATURATION: 93 % | RESPIRATION RATE: 18 BRPM | WEIGHT: 181 LBS | HEIGHT: 66 IN | DIASTOLIC BLOOD PRESSURE: 68 MMHG

## 2020-12-17 LAB
ANION GAP SERPL CALCULATED.3IONS-SCNC: 8.1 MMOL/L (ref 5–15)
BUN SERPL-MCNC: 8 MG/DL (ref 8–23)
BUN/CREAT SERPL: 12.5 (ref 7–25)
CALCIUM SPEC-SCNC: 8.3 MG/DL (ref 8.6–10.5)
CHLORIDE SERPL-SCNC: 100 MMOL/L (ref 98–107)
CO2 SERPL-SCNC: 25.9 MMOL/L (ref 22–29)
CREAT SERPL-MCNC: 0.64 MG/DL (ref 0.57–1)
DEPRECATED RDW RBC AUTO: 48.9 FL (ref 37–54)
ERYTHROCYTE [DISTWIDTH] IN BLOOD BY AUTOMATED COUNT: 14.5 % (ref 12.3–15.4)
GFR SERPL CREATININE-BSD FRML MDRD: 89 ML/MIN/1.73
GLUCOSE SERPL-MCNC: 108 MG/DL (ref 65–99)
HCT VFR BLD AUTO: 28.4 % (ref 34–46.6)
HGB BLD-MCNC: 9.2 G/DL (ref 12–15.9)
MCH RBC QN AUTO: 30.1 PG (ref 26.6–33)
MCHC RBC AUTO-ENTMCNC: 32.4 G/DL (ref 31.5–35.7)
MCV RBC AUTO: 92.8 FL (ref 79–97)
PLATELET # BLD AUTO: 516 10*3/MM3 (ref 140–450)
PMV BLD AUTO: 8.7 FL (ref 6–12)
POTASSIUM SERPL-SCNC: 4.3 MMOL/L (ref 3.5–5.2)
RBC # BLD AUTO: 3.06 10*6/MM3 (ref 3.77–5.28)
SODIUM SERPL-SCNC: 134 MMOL/L (ref 136–145)
WBC # BLD AUTO: 9.71 10*3/MM3 (ref 3.4–10.8)

## 2020-12-17 PROCEDURE — 97110 THERAPEUTIC EXERCISES: CPT

## 2020-12-17 PROCEDURE — 85027 COMPLETE CBC AUTOMATED: CPT | Performed by: INTERNAL MEDICINE

## 2020-12-17 PROCEDURE — 97530 THERAPEUTIC ACTIVITIES: CPT

## 2020-12-17 PROCEDURE — 80048 BASIC METABOLIC PNL TOTAL CA: CPT | Performed by: INTERNAL MEDICINE

## 2020-12-17 PROCEDURE — 25010000002 ENOXAPARIN PER 10 MG: Performed by: ORTHOPAEDIC SURGERY

## 2020-12-17 RX ORDER — HYDROCODONE BITARTRATE AND ACETAMINOPHEN 7.5; 325 MG/1; MG/1
1 TABLET ORAL EVERY 6 HOURS PRN
Start: 2020-12-17

## 2020-12-17 RX ADMIN — HYDROCODONE BITARTRATE AND ACETAMINOPHEN 1 TABLET: 10; 325 TABLET ORAL at 07:34

## 2020-12-17 RX ADMIN — HYDROCODONE BITARTRATE AND ACETAMINOPHEN 1 TABLET: 10; 325 TABLET ORAL at 03:11

## 2020-12-17 RX ADMIN — SODIUM CHLORIDE, PRESERVATIVE FREE 3 ML: 5 INJECTION INTRAVENOUS at 09:03

## 2020-12-17 RX ADMIN — ENOXAPARIN SODIUM 40 MG: 100 INJECTION SUBCUTANEOUS at 09:03

## 2020-12-17 RX ADMIN — HYDROCODONE BITARTRATE AND ACETAMINOPHEN 1 TABLET: 10; 325 TABLET ORAL at 14:49

## 2020-12-17 RX ADMIN — SODIUM CHLORIDE, PRESERVATIVE FREE 10 ML: 5 INJECTION INTRAVENOUS at 09:04

## 2020-12-17 RX ADMIN — BACITRACIN: 500 OINTMENT OPHTHALMIC at 09:04

## 2020-12-18 ENCOUNTER — READMISSION MANAGEMENT (OUTPATIENT)
Dept: CALL CENTER | Facility: HOSPITAL | Age: 85
End: 2020-12-18

## 2020-12-18 LAB — BACTERIA SPEC ANAEROBE CULT: NORMAL

## 2020-12-18 NOTE — OUTREACH NOTE
Prep Survey      Responses   [facility] discharged from?  [place]   Is LACE score < 7 ?  No   Eligibility  Readm Mgmt   Discharge diagnosis  Closed nondisplaced oblique fracture of shaft of right femur OPEN REDUCTION INTERNAL FIXATION RIGHT FEMUR PERIPROSTHETIC FRACTURE, TOTAL HIP ARTHROPLASTY REVISION   Does the patient have one of the following disease processes/diagnoses(primary or secondary)?  Total Joint Replacement   Does the patient have Home health ordered?  Yes   What is the Home health agency?   [facility]   Is there a DME ordered?  No   Prep survey completed?  Yes          Lila Bright RN

## 2020-12-21 ENCOUNTER — READMISSION MANAGEMENT (OUTPATIENT)
Dept: CALL CENTER | Facility: HOSPITAL | Age: 85
End: 2020-12-21

## 2020-12-21 NOTE — OUTREACH NOTE
Total Joint Week 1 Survey      Responses   [facility] patient discharged from?  [place]   Does the patient have one of the following disease processes/diagnoses(primary or secondary)?  Total Joint Replacement   Joint surgery performed?  Hip   Week 1 attempt successful?  Yes   Call start time  1536   Call end time  1538   Discharge diagnosis  Closed nondisplaced oblique fracture of shaft of right femur OPEN REDUCTION INTERNAL FIXATION RIGHT FEMUR PERIPROSTHETIC FRACTURE, TOTAL HIP ARTHROPLASTY REVISION   Does the patient have all medications related to this admission filled (includes all antibiotics, pain medications, etc.)  Yes   Is the patient taking all medications as directed (includes completed medication regime)?  Yes   Is the patient able to teach back alternate methods of pain control?  Ice   Does the patient have a follow up appointment with their surgeon?  Yes   Has the patient kept scheduled appointments due by today?  Yes   What is the Home health agency?   [facility]   Has home health visited the patient within 72 hours of discharge?  Yes   Psychosocial issues?  No   Has the patient began therapy sessions (either in the home or as an out patient)?  No   Does the patient have a wound vac in place?  N/A   Has the patient fallen since discharge?  No   Did the patient receive a copy of their discharge instructions?  Yes   Nursing interventions  Reviewed instructions with patient   What is the patient's perception of their functional status since discharge?  Improving   Is the patient able to teach back signs and symptoms of infection?  Temp >100.4 for 24h or longer, Incisional drainage, Increased swelling or redness around incision (not associated with surgical edema)   Is the patient able to teach back how to prevent infection?  Check incision daily, Wash hands before and after touching incision   Is the patient able to teach back home safety measures?  Ability to shower,  Modifications with ADLs such as dressing, cooking, toileting, Accessibility to necessary areas in home, Modifications to reach items   Did the patient implement home safety suggestions from pre-surgery classes if attended?  N/A   Is the patient/caregiver able to teach back the hierarchy of who to call/visit for symptoms/problems? PCP, Specialist, Home health nurse, Urgent Care, ED, 911  Yes   Week 1 call completed?  Yes          Mikie Luis RN

## 2020-12-29 ENCOUNTER — READMISSION MANAGEMENT (OUTPATIENT)
Dept: CALL CENTER | Facility: HOSPITAL | Age: 85
End: 2020-12-29

## 2020-12-29 NOTE — OUTREACH NOTE
Total Joint Week 2 Survey      Responses   [facility] patient discharged from?  [place]   Does the patient have one of the following disease processes/diagnoses(primary or secondary)?  Total Joint Replacement   Joint surgery performed?  Hip   Week 2 attempt successful?  Yes   Call start time  1504   Call end time  1520   Discharge diagnosis  Closed nondisplaced oblique fracture of shaft of right femur OPEN REDUCTION INTERNAL FIXATION RIGHT FEMUR PERIPROSTHETIC FRACTURE, TOTAL HIP ARTHROPLASTY REVISION   Is patient permission given to speak with other caregiver?  Yes   List who call center can speak with     Comments regarding appointments  Pt has not seen MD for f/u per pt as she can't get into car r/t inability to straighten leg.    Has the patient kept scheduled appointments due by today?  N/A   What is the Home health agency?   [facility] [place]   Has home health visited the patient within 72 hours of discharge?  Yes   Home health comments  PT   Psychosocial issues?  No   Has the patient began therapy sessions (either in the home or as an out patient)?  Yes   Does the patient have a wound vac in place?  N/A   Has the patient fallen since discharge?  No   Comments  Pt had 80 staples removed yesterday, incision healing well per pt report. She & her  are both homebound, they need their flu shot, podiatry care &other day to day issues. She is inquiring how to get these done, I advised to call MD office about adding Skilled nsg & aides to HH orders to see if this would help. Pt says she can't go to MD appt as she can't get into car yet r/t she has to keep leg straight & she can't do so in a car. They have no family locally. They have a caregiver that helps daily.    What is the patient's perception of their functional status since discharge?  Improving   Is the patient able to teach back signs and symptoms of infection?  Incisional drainage, Temp >100.4 for 24h or longer   Is  the patient able to teach back how to prevent infection?  Keep incision covered if pets in house, Wash hands before and after touching incision, Check incision daily   Is the patient/caregiver able to teach back the hierarchy of who to call/visit for symptoms/problems? PCP, Specialist, Home health nurse, Urgent Care, ED, 911  Yes   Week 2 call completed?  Yes          Tamiko Rocha, RN

## 2021-01-14 ENCOUNTER — READMISSION MANAGEMENT (OUTPATIENT)
Dept: CALL CENTER | Facility: HOSPITAL | Age: 86
End: 2021-01-14

## 2021-01-14 NOTE — OUTREACH NOTE
Total Joint Month 1 Survey      Responses   [facility] patient discharged from?  [place]   Does the patient have one of the following disease processes/diagnoses(primary or secondary)?  Total Joint Replacement   Joint surgery performed?  Hip   Month 1 attempt successful?  Yes   Call start time  1339   Call end time  1341   Has the patient been back in either the hospital or Emergency Department since discharge?  No   Discharge diagnosis  Closed nondisplaced oblique fracture of shaft of right femur OPEN REDUCTION INTERNAL FIXATION RIGHT FEMUR PERIPROSTHETIC FRACTURE, TOTAL HIP ARTHROPLASTY REVISION   Is the patient taking all medications as directed (includes completed medication regime)?  Yes   Is the patient able to teach back alternate methods of pain control?  Ice, Reposition, Correct alignment, Short, frequent activity   Has the patient kept scheduled appointments due by today?  Yes   Is the patient still receiving Home Health Services?  Yes   Is the patient still attending therapy sessions(either in the home or as an outpatient)?  Yes   Has the patient fallen since discharge?  No   What is the patient's perception of their functional status since discharge?  Improving   Is the patient able to teach back signs and symptoms of infection?  Temp >100.4 for 24h or longer, Incisional drainage, Blisters around incision, Increased swelling or redness around incision (not associated with surgical edema), Severe discomfort or pain, Changes in mobility, Shortness of breath or chest pain   If the patient is a current smoker, are they able to teach back resources for cessation?  Not a smoker   Is the patient/caregiver able to teach back the hierarchy of who to call/visit for symptoms/problems? PCP, Specialist, Home health nurse, Urgent Care, ED, 911  Yes   Month 1 call completed?  Yes          Estefany [place] RN

## 2021-02-17 ENCOUNTER — READMISSION MANAGEMENT (OUTPATIENT)
Dept: CALL CENTER | Facility: HOSPITAL | Age: 86
End: 2021-02-17

## 2021-02-17 NOTE — OUTREACH NOTE
Total Joint Month 2 Survey      Responses   [facility] patient discharged from?  [place]   Does the patient have one of the following disease processes/diagnoses(primary or secondary)?  Total Joint Replacement   Joint surgery performed?  Hip   Month 2 attempt successful?  Yes   Call start time  1323   Call end time  1338   Has the patient been back in either the hospital or Emergency Department since discharge?  No   Discharge diagnosis  Closed nondisplaced oblique fracture of shaft of right femur OPEN REDUCTION INTERNAL FIXATION RIGHT FEMUR PERIPROSTHETIC FRACTURE, TOTAL HIP ARTHROPLASTY REVISION   Is the patient taking all medications as directed (includes completed medication regime)?  Yes   Is the patient able to teach back alternate methods of pain control?  Reposition, Correct alignment, Short, frequent activity [uses heat for muscle stretching]   Has the patient kept scheduled appointments due by today?  N/A   Is the patient still receiving Home Health Services?  Yes   Is the patient still attending therapy sessions(either in the home or as an outpatient)?  No [has completed HH PT]   Has the patient fallen since discharge?  No   Comments  has not had f/u since discharge, states due to weather and inability to get out of home, and has no confidence with transport services due to past experiences, is waiting for approval for in home xray, has completed HH PT   What is the patient's perception of their functional status since discharge?  Improving   Is the patient/caregiver able to teach back the hierarchy of who to call/visit for symptoms/problems? PCP, Specialist, Home health nurse, Urgent Care, ED, 911  Yes   Additional teach back comments  pt only bears weight when wearing brace   Month 2 Call Completed?  Yes          Laura Giraldo RN

## 2021-03-22 ENCOUNTER — READMISSION MANAGEMENT (OUTPATIENT)
Dept: CALL CENTER | Facility: HOSPITAL | Age: 86
End: 2021-03-22

## 2021-03-22 NOTE — OUTREACH NOTE
Total Joint Month 3 Survey      Responses   [facility] patient discharged from?  [place]   Does the patient have one of the following disease processes/diagnoses(primary or secondary)?  Total Joint Replacement   Joint surgery performed?  Hip   Month 3 attempt successful?  Yes   Call start time  1153   Call end time  1155   Has the patient been back in either the hospital or Emergency Department since discharge?  No   Is the patient taking all medications as directed (includes completed medication regime)?  Yes   Is the patient able to teach back alternate methods of pain control?  Reposition, Correct alignment, Short, frequent activity   Has the patient kept scheduled appointments due by today?  N/A   Is the patient still receiving Home Health Services?  Yes   Is the patient still attending therapy sessions(either in the home or as an outpatient)?  No   Has the patient fallen since discharge?  No   What is the patient's perception of their functional status since discharge?  Improving   Is the patient able to teach back signs and symptoms of infection?  Temp >100.4 for 24h or longer, Incisional drainage, Blisters around incision, Increased swelling or redness around incision (not associated with surgical edema), Severe discomfort or pain, Changes in mobility, Shortness of breath or chest pain   Is the patient/caregiver able to teach back the hierarchy of who to call/visit for symptoms/problems? PCP, Specialist, Home health nurse, Urgent Care, ED, 911  Yes   Graduated  Yes          Mikie Luis RN

## 2021-12-15 ENCOUNTER — HOSPITAL ENCOUNTER (EMERGENCY)
Age: 86
Discharge: STILL ADMITTED OR ADMINISTRATIVE | End: 2021-12-15

## 2024-10-17 NOTE — DISCHARGE PLACEMENT REQUEST
"Carla Irvin (81 y.o. Female)     Date of Birth Social Security Number Address Home Phone MRN    1935  [address] [address]  [facility] [address] [phone] [id number]    [employer] Marital Status          None        Admission Date Admission Type Admitting Provider Attending Provider Department, Room/Bed    6/16/17 Emergency Dario Verduzco MD Schmitt, Christopher E, MD [address], P777/1    Discharge Date Discharge Disposition Discharge Destination                      Attending Provider: Dhruv Montalvo MD     Allergies:  No Known Allergies    Isolation:  None   Infection:  None   Code Status:  FULL    Ht:  67\" (170.2 cm)   Wt:  162 lb (73.5 kg)    Admission Cmt:  None   Principal Problem:  None                Active Insurance as of 6/16/2017     Primary Coverage     Payor Plan Insurance Group Employer/Plan Group    ANTHEM MEDICARE REPLACEMENT ANTHEM MEDICARE ADVANTAGE [id number]     Payor Plan Address Payor Plan Phone Number Effective From Effective To    [address] [phone] 1/1/2017     [place] [address]       Subscriber Name Subscriber Birth Date Member ID       CARLA IRVIN 1935 [id number]                 Emergency Contacts      (Rel.) Home Phone Work Phone Mobile Phone    BethGurmeet PIMENTEL (Spouse) [phone] -- --              " Patient requesting order for CT cardiac calcium scoring. Last done 3/7. LOV 10/8

## 2025-06-02 ENCOUNTER — TRANSCRIBE ORDERS (OUTPATIENT)
Age: 89
End: 2025-06-02
Payer: MEDICARE

## 2025-06-02 DIAGNOSIS — I87.2 VENOUS STASIS DERMATITIS: ICD-10-CM

## 2025-06-02 DIAGNOSIS — I87.2 VENOUS (PERIPHERAL) INSUFFICIENCY: Primary | ICD-10-CM

## (undated) DEVICE — TOWEL,OR,DSP,ST,BLUE,STD,4/PK,20PK/CS: Brand: MEDLINE

## (undated) DEVICE — COVER,MAYO STAND,STERILE: Brand: MEDLINE

## (undated) DEVICE — CONTAINER,SPECIMEN,OR STERILE,4OZ: Brand: MEDLINE

## (undated) DEVICE — HEWSON SUTURE RETRIEVER: Brand: HEWSON SUTURE RETRIEVER

## (undated) DEVICE — APPL CHLORAPREP HI/LITE 26ML ORNG

## (undated) DEVICE — GLV SURG SIGNATURE ESSENTIAL PF LTX SZ8

## (undated) DEVICE — 1000 S-DRAPE TOWEL DRAPE 10/BX: Brand: STERI-DRAPE™

## (undated) DEVICE — ANTIBACTERIAL UNDYED BRAIDED (POLYGLACTIN 910), SYNTHETIC ABSORBABLE SUTURE: Brand: COATED VICRYL

## (undated) DEVICE — PK HIP PINNING 40

## (undated) DEVICE — MARKR SKIN W/RULR AND LBL

## (undated) DEVICE — SPONGE,DRAIN,NONWVN,4"X4",6PLY,STRL,LF: Brand: MEDLINE

## (undated) DEVICE — IMPLANTABLE DEVICE
Type: IMPLANTABLE DEVICE | Site: HIP | Status: NON-FUNCTIONAL
Removed: 2020-12-13

## (undated) DEVICE — GLV SURG PREMIERPRO ORTHO LTX PF SZ8 BRN

## (undated) DEVICE — SPNG LAP 18X18IN LF STRL PK/5

## (undated) DEVICE — HANDPIECE SET WITH COAXIAL HIGH FLOW TIP AND SUCTION TUBE: Brand: INTERPULSE

## (undated) DEVICE — STPLR SKIN VISISTAT WD 35CT

## (undated) DEVICE — REAMER SHAFT, MOD.TRINKLE: Brand: BIXCUT

## (undated) DEVICE — SKIN PREP TRAY W/CHG: Brand: MEDLINE INDUSTRIES, INC.

## (undated) DEVICE — OPTIFOAM GENTLE SA, POSTOP, 4X8: Brand: MEDLINE

## (undated) DEVICE — PK ATS CUST W CARDIOTOMY RESEVOIR

## (undated) DEVICE — MAT FLR ABSORBENT LG 4FT 10 2.5FT

## (undated) DEVICE — KT DRN EVAC WND PVC PCH WTROC RND 10F400

## (undated) DEVICE — SUT VIC 0 CT1 36IN J946H

## (undated) DEVICE — DRAPE,REIN 53X77,STERILE: Brand: MEDLINE

## (undated) DEVICE — INTENDED FOR TISSUE SEPARATION, AND OTHER PROCEDURES THAT REQUIRE A SHARP SURGICAL BLADE TO PUNCTURE OR CUT.: Brand: BARD-PARKER ® CARBON RIB-BACK BLADES

## (undated) DEVICE — PENCL E/S ULTRAVAC TELESCP NOSE HOLSTR 10FT

## (undated) DEVICE — REFLECTION FLEXIBLE DRILL 35MM: Brand: REFLECTION

## (undated) DEVICE — GOWN,NON-REINFORCED,SIRUS,SET IN SLV,XL: Brand: MEDLINE

## (undated) DEVICE — GLV SURG SENSICARE ORTHO PF LF 8 STRL

## (undated) DEVICE — GLV SURG BIOGEL LTX PF 8

## (undated) DEVICE — SUT ETHIB 0 CT1 CR8 18IN CX21D

## (undated) DEVICE — OPTIFOAM GENTLE SA, POSTOP, 4X12: Brand: MEDLINE

## (undated) DEVICE — DRAPE,U/ SHT,SPLIT,PLAS,STERIL: Brand: MEDLINE

## (undated) DEVICE — POOLE SUCTION HANDLE: Brand: CARDINAL HEALTH

## (undated) DEVICE — SYR CONTRL LUERLOK 10CC

## (undated) DEVICE — GOWN,PREVENTION PLUS,XLONG/XLARGE,STRL: Brand: MEDLINE

## (undated) DEVICE — SOL ISO/ALC RUB 70PCT 4OZ

## (undated) DEVICE — SUT VIC 3/0 CTI 36IN J944H

## (undated) DEVICE — DRSNG WND GEL FIBR OPTICELL AG PLS W/SLV LF 4X5IN  STRL

## (undated) DEVICE — DRAPE,HIP,W/POUCHES,STERILE: Brand: MEDLINE

## (undated) DEVICE — TRAP FLD MINIVAC MEGADYNE 100ML

## (undated) DEVICE — IMMOB KN 3PNL DLX CANVS 22IN BLU

## (undated) DEVICE — SUT VIC 1 CT1 36IN J947H

## (undated) DEVICE — GUIDE WIRE, BALL-TIPPED, STERILE

## (undated) DEVICE — DRSNG SURESITE WNDW 2.38X2.75

## (undated) DEVICE — PROXIMATE RH ROTATING HEAD SKIN STAPLERS (35 WIDE) CONTAINS 35 STAINLESS STEEL STAPLES: Brand: PROXIMATE

## (undated) DEVICE — DRILL, AO SMALL: Brand: GAMMA

## (undated) DEVICE — BLOOD TRANSFUSION FILTER: Brand: HAEMONETICS

## (undated) DEVICE — DRSNG WND SIL OPTIFOAM GENTLE BRDR ADHS W/SA 4X4IN

## (undated) DEVICE — DRILL BIT LOCKING, MEDIUM: Brand: AXSOS

## (undated) DEVICE — PK HIP TOTL 40

## (undated) DEVICE — GLV SURG TRIUMPH CLASSIC PF LTX 8 STRL

## (undated) DEVICE — PAD,ABDOMINAL,8"X10",ST,LF: Brand: MEDLINE

## (undated) DEVICE — APPL CHLORAPREP W/TINT 26ML ORNG

## (undated) DEVICE — 3M™ IOBAN™ 2 ANTIMICROBIAL INCISE DRAPE 6650EZ: Brand: IOBAN™ 2

## (undated) DEVICE — OCCLUSIVE GAUZE STRIP,3% BISMUTH TRIBROMOPHENATE IN PETROLATUM BLEND: Brand: XEROFORM

## (undated) DEVICE — INTENDED FOR TISSUE SEPARATION, AND OTHER PROCEDURES THAT REQUIRE A SHARP SURGICAL BLADE TO PUNCTURE OR CUT.: Brand: BARD-PARKER ® STAINLESS STEEL BLADES

## (undated) DEVICE — GLV SURG TRIUMPH ORTHO W/ALOE PF LTX 8 STRL

## (undated) DEVICE — CLOSED TUBE CLIP: Brand: GAMMA

## (undated) DEVICE — REFLECTION FLEXIBLE DRILL 25MM: Brand: REFLECTION